# Patient Record
Sex: FEMALE | Race: WHITE | NOT HISPANIC OR LATINO | Employment: UNEMPLOYED | URBAN - METROPOLITAN AREA
[De-identification: names, ages, dates, MRNs, and addresses within clinical notes are randomized per-mention and may not be internally consistent; named-entity substitution may affect disease eponyms.]

---

## 2017-05-22 ENCOUNTER — APPOINTMENT (OUTPATIENT)
Dept: WOMENS IMAGING | Facility: HOSPITAL | Age: 67
End: 2017-05-22

## 2017-05-22 PROCEDURE — 77063 BREAST TOMOSYNTHESIS BI: CPT | Performed by: RADIOLOGY

## 2017-05-22 PROCEDURE — 77067 SCR MAMMO BI INCL CAD: CPT | Performed by: RADIOLOGY

## 2018-03-27 ENCOUNTER — OFFICE VISIT (OUTPATIENT)
Dept: CARDIOLOGY | Facility: CLINIC | Age: 68
End: 2018-03-27

## 2018-03-27 VITALS
SYSTOLIC BLOOD PRESSURE: 154 MMHG | HEART RATE: 88 BPM | HEIGHT: 64 IN | DIASTOLIC BLOOD PRESSURE: 70 MMHG | BODY MASS INDEX: 30.42 KG/M2 | WEIGHT: 178.2 LBS | OXYGEN SATURATION: 95 %

## 2018-03-27 DIAGNOSIS — R09.89 ABSENT PEDAL PULSES: ICD-10-CM

## 2018-03-27 DIAGNOSIS — R09.89 BRUIT OF LEFT CAROTID ARTERY: ICD-10-CM

## 2018-03-27 DIAGNOSIS — R07.2 PRECORDIAL PAIN: Primary | ICD-10-CM

## 2018-03-27 DIAGNOSIS — E78.2 MIXED HYPERLIPIDEMIA: ICD-10-CM

## 2018-03-27 DIAGNOSIS — I73.9 PAD (PERIPHERAL ARTERY DISEASE) (HCC): ICD-10-CM

## 2018-03-27 DIAGNOSIS — R01.1 SYSTOLIC EJECTION MURMUR: ICD-10-CM

## 2018-03-27 DIAGNOSIS — I25.119 CORONARY ARTERY DISEASE INVOLVING NATIVE CORONARY ARTERY OF NATIVE HEART WITH ANGINA PECTORIS (HCC): ICD-10-CM

## 2018-03-27 DIAGNOSIS — I10 ESSENTIAL HYPERTENSION: ICD-10-CM

## 2018-03-27 DIAGNOSIS — R09.89 DECREASED PEDAL PULSES: ICD-10-CM

## 2018-03-27 DIAGNOSIS — M79.10 MYALGIA: ICD-10-CM

## 2018-03-27 DIAGNOSIS — R06.02 SHORTNESS OF BREATH: ICD-10-CM

## 2018-03-27 PROBLEM — E11.9 DIABETES (HCC): Status: ACTIVE | Noted: 2018-03-27

## 2018-03-27 PROCEDURE — 99204 OFFICE O/P NEW MOD 45 MIN: CPT | Performed by: INTERNAL MEDICINE

## 2018-03-27 RX ORDER — CLONIDINE HYDROCHLORIDE 0.1 MG/1
TABLET ORAL 3 TIMES DAILY
COMMUNITY
Start: 2018-03-13 | End: 2018-04-30 | Stop reason: SDUPTHER

## 2018-03-27 RX ORDER — METOPROLOL SUCCINATE 100 MG/1
TABLET, EXTENDED RELEASE ORAL DAILY
COMMUNITY
Start: 2018-02-02 | End: 2019-01-15 | Stop reason: SDUPTHER

## 2018-03-27 RX ORDER — CETIRIZINE HYDROCHLORIDE 10 MG/1
10 TABLET ORAL DAILY
COMMUNITY
End: 2019-08-22 | Stop reason: DRUGHIGH

## 2018-03-27 RX ORDER — LOSARTAN POTASSIUM AND HYDROCHLOROTHIAZIDE 25; 100 MG/1; MG/1
TABLET ORAL DAILY
COMMUNITY
Start: 2018-03-20 | End: 2018-04-30 | Stop reason: SDUPTHER

## 2018-03-27 RX ORDER — NITROGLYCERIN 0.4 MG/1
TABLET SUBLINGUAL AS NEEDED
COMMUNITY
Start: 2018-01-26 | End: 2020-03-11 | Stop reason: SDUPTHER

## 2018-03-27 RX ORDER — CLOPIDOGREL BISULFATE 75 MG/1
75 TABLET ORAL DAILY
COMMUNITY
Start: 2018-03-13

## 2018-03-27 RX ORDER — ATORVASTATIN CALCIUM 40 MG/1
40 TABLET, FILM COATED ORAL DAILY
COMMUNITY
Start: 2018-03-13 | End: 2019-03-06

## 2018-03-27 RX ORDER — PANTOPRAZOLE SODIUM 40 MG/1
40 TABLET, DELAYED RELEASE ORAL DAILY
COMMUNITY
Start: 2018-01-12

## 2018-03-27 RX ORDER — AMLODIPINE BESYLATE 5 MG/1
TABLET ORAL 2 TIMES DAILY
COMMUNITY
Start: 2018-03-20 | End: 2018-05-08 | Stop reason: SDUPTHER

## 2018-03-27 RX ORDER — ALPRAZOLAM 0.5 MG/1
0.5 TABLET ORAL 2 TIMES DAILY PRN
COMMUNITY
Start: 2018-03-23 | End: 2020-07-19

## 2018-03-27 RX ORDER — SENNOSIDES 8.6 MG
TABLET ORAL DAILY PRN
COMMUNITY

## 2018-03-27 NOTE — PATIENT INSTRUCTIONS
Steps to Quit Smoking  Smoking tobacco can be bad for your health. It can also affect almost every organ in your body. Smoking puts you and people around you at risk for many serious long-lasting (chronic) diseases. Quitting smoking is hard, but it is one of the best things that you can do for your health. It is never too late to quit.  What are the benefits of quitting smoking?  When you quit smoking, you lower your risk for getting serious diseases and conditions. They can include:  · Lung cancer or lung disease.  · Heart disease.  · Stroke.  · Heart attack.  · Not being able to have children (infertility).  · Weak bones (osteoporosis) and broken bones (fractures).  If you have coughing, wheezing, and shortness of breath, those symptoms may get better when you quit. You may also get sick less often. If you are pregnant, quitting smoking can help to lower your chances of having a baby of low birth weight.  What can I do to help me quit smoking?  Talk with your doctor about what can help you quit smoking. Some things you can do (strategies) include:  · Quitting smoking totally, instead of slowly cutting back how much you smoke over a period of time.  · Going to in-person counseling. You are more likely to quit if you go to many counseling sessions.  · Using resources and support systems, such as:  ¨ Online chats with a counselor.  ¨ Phone quitlines.  ¨ Printed self-help materials.  ¨ Support groups or group counseling.  ¨ Text messaging programs.  ¨ Mobile phone apps or applications.  · Taking medicines. Some of these medicines may have nicotine in them. If you are pregnant or breastfeeding, do not take any medicines to quit smoking unless your doctor says it is okay. Talk with your doctor about counseling or other things that can help you.  Talk with your doctor about using more than one strategy at the same time, such as taking medicines while you are also going to in-person counseling. This can help make quitting  easier.  What things can I do to make it easier to quit?  Quitting smoking might feel very hard at first, but there is a lot that you can do to make it easier. Take these steps:  · Talk to your family and friends. Ask them to support and encourage you.  · Call phone quitlines, reach out to support groups, or work with a counselor.  · Ask people who smoke to not smoke around you.  · Avoid places that make you want (trigger) to smoke, such as:  ¨ Bars.  ¨ Parties.  ¨ Smoke-break areas at work.  · Spend time with people who do not smoke.  · Lower the stress in your life. Stress can make you want to smoke. Try these things to help your stress:  ¨ Getting regular exercise.  ¨ Deep-breathing exercises.  ¨ Yoga.  ¨ Meditating.  ¨ Doing a body scan. To do this, close your eyes, focus on one area of your body at a time from head to toe, and notice which parts of your body are tense. Try to relax the muscles in those areas.  · Download or buy apps on your mobile phone or tablet that can help you stick to your quit plan. There are many free apps, such as QuitGuide from the CDC (Centers for Disease Control and Prevention). You can find more support from smokefree.gov and other websites.  This information is not intended to replace advice given to you by your health care provider. Make sure you discuss any questions you have with your health care provider.  Document Released: 10/14/2010 Document Revised: 08/15/2017 Document Reviewed: 05/03/2016  [employer] Patient Education © 2017 [employer]  Fat and Cholesterol Restricted Diet  Getting too much fat and cholesterol in your diet may cause health problems. Following this diet helps keep your fat and cholesterol at normal levels. This can keep you from getting sick.  What types of fat should I choose?  · Choose monosaturated and polyunsaturated fats. These are found in foods such as olive oil, canola oil, flaxseeds, walnuts, almonds, and seeds.  · Eat more omega-3  "fats. Good choices include salmon, mackerel, sardines, tuna, flaxseed oil, and ground flaxseeds.  · Limit saturated fats. These are in animal products such as meats, butter, and cream. They can also be in plant products such as palm oil, palm kernel oil, and coconut oil.  · Avoid foods with partially hydrogenated oils in them. These contain trans fats. Examples of foods that have trans fats are stick margarine, some tub margarines, cookies, crackers, and other baked goods.  What general guidelines do I need to follow?  · Check food labels. Look for the words \"trans fat\" and \"saturated fat.\"  · When preparing a meal:  ¨ Fill half of your plate with vegetables and green salads.  ¨ Fill one fourth of your plate with whole grains. Look for the word \"whole\" as the first word in the ingredient list.  ¨ Fill one fourth of your plate with lean protein foods.  · Eat more foods that have fiber, like apples, carrots, beans, peas, and barley.  · Eat more home-cooked foods. Eat less at restaurants and buffets.  · Limit or avoid alcohol.  · Limit foods high in starch and sugar.  · Limit fried foods.  · Cook foods without frying them. Baking, boiling, grilling, and broiling are all great options.  · Lose weight if you are overweight. Losing even a small amount of weight can help your overall health. It can also help prevent diseases such as diabetes and heart disease.  What foods can I eat?  Grains   Whole grains, such as whole wheat or whole grain breads, crackers, cereals, and pasta. Unsweetened oatmeal, bulgur, barley, quinoa, or brown rice. Corn or whole wheat flour tortillas.  Vegetables   Fresh or frozen vegetables (raw, steamed, roasted, or grilled). Green salads.  Fruits   All fresh, canned (in natural juice), or frozen fruits.  Meat and Other Protein Products   Ground beef (85% or leaner), grass-fed beef, or beef trimmed of fat. Skinless chicken or turkey. Ground chicken or turkey. Pork trimmed of fat. All fish and " seafood. Eggs. Dried beans, peas, or lentils. Unsalted nuts or seeds. Unsalted canned or dry beans.  Dairy   Low-fat dairy products, such as skim or 1% milk, 2% or reduced-fat cheeses, low-fat ricotta or cottage cheese, or plain low-fat yogurt.  Fats and Oils   Tub margarines without trans fats. Light or reduced-fat mayonnaise and salad dressings. Avocado. Olive, canola, sesame, or safflower oils. Natural peanut or almond butter (choose ones without added sugar and oil).  The items listed above may not be a complete list of recommended foods or beverages. Contact your dietitian for more options.   What foods are not recommended?  Grains   White bread. White pasta. White rice. Cornbread. Bagels, pastries, and croissants. Crackers that contain trans fat.  Vegetables   White potatoes. Corn. Creamed or fried vegetables. Vegetables in a cheese sauce.  Fruits   Dried fruits. Canned fruit in light or heavy syrup. Fruit juice.  Meat and Other Protein Products   Fatty cuts of meat. Ribs, chicken wings, smith, sausage, bologna, salami, chitterlings, fatback, hot dogs, bratwurst, and packaged luncheon meats. Liver and organ meats.  Dairy   Whole or 2% milk, cream, half-and-half, and cream cheese. Whole milk cheeses. Whole-fat or sweetened yogurt. Full-fat cheeses. Nondairy creamers and whipped toppings. Processed cheese, cheese spreads, or cheese curds.  Sweets and Desserts   Corn syrup, sugars, honey, and molasses. Candy. Jam and jelly. Syrup. Sweetened cereals. Cookies, pies, cakes, donuts, muffins, and ice cream.  Fats and Oils   Butter, stick margarine, lard, shortening, ghee, or smith fat. Coconut, palm kernel, or palm oils.  Beverages   Alcohol. Sweetened drinks (such as sodas, lemonade, and fruit drinks or punches).  The items listed above may not be a complete list of foods and beverages to avoid. Contact your dietitian for more information.   This information is not intended to replace advice given to you by your  health care provider. Make sure you discuss any questions you have with your health care provider.  Document Released: 06/18/2013 Document Revised: 08/24/2017 Document Reviewed: 03/19/2015  [employer] Interactive Patient Education © 2017 [employer]  BMI for Adults  Body mass index (BMI) is a number that is calculated from a person's weight and height. In most adults, the number is used to find how much of an adult's weight is made up of fat. BMI is not as accurate as a direct measure of body fat.  How is BMI calculated?  BMI is calculated by dividing weight in kilograms by height in meters squared. It can also be calculated by dividing weight in pounds by height in inches squared, then multiplying the resulting number by 703. Charts are available to help you find your BMI quickly and easily without doing this calculation.  How is BMI interpreted?  Health care professionals use BMI charts to identify whether an adult is underweight, at a normal weight, or overweight based on the following guidelines:  · Underweight: BMI less than 18.5.  · Normal weight: BMI between 18.5 and 24.9.  · Overweight: BMI between 25 and 29.9.  · Obese: BMI of 30 and above.  BMI is usually interpreted the same for males and females.  Weight includes both fat and muscle, so someone with a muscular build, such as an athlete, may have a BMI that is higher than 24.9. In cases like these, BMI may not accurately depict body fat. To determine if excess body fat is the cause of a BMI of 25 or higher, further assessments may need to be done by a health care provider.  Why is BMI a useful tool?  BMI is used to identify a possible weight problem that may be related to a medical problem or may increase the risk for medical problems. BMI can also be used to promote changes to reach a healthy weight.  This information is not intended to replace advice given to you by your health care provider. Make sure you discuss any questions you have with your health  care provider.  Document Released: 08/29/2005 Document Revised: 04/27/2017 Document Reviewed: 05/15/2015  [employer] Interactive Patient Education © 2017 [employer]

## 2018-03-27 NOTE — PROGRESS NOTES
"Tia Franco is a 67 y.o. female     Chief Complaint   Patient presents with   • Coronary Artery Disease   • Hypertension     has been to [facility] with BP running over 200/110's    • Shortness of Breath       PROBLEM LIST:     1. Coronary artery disease   1.1 stenting   2. Hyperlipidemia   3. Hypertension   4. Triple femoral bypass   5. Syncope  5.1 positive tilt table test, inadequate data   6. Diabetes     Specialty Problems     None            HPI:  Ms. Lyndsay Franco is a 67-year-old white female who presents today for a from Dr. Otoole office to establish care.    The patient has known coronary artery disease and underwent stenting in 2004 and stenting in late 2016 or early 2017 with symptoms of shortness of breath.  A she states she \"had a heart attack\" at one point in time apparently this preceded her first stent.  Ms. Franco also has peripheral arterial disease in his undergone stenting of the left lower extremity.  She presents today because of severe high blood pressure.  Blood pressures have been reasonably well-controlled for prolonged period but, over the last month, markedly increased to as high as 220 mmHg in systole [facility] at [facility].    Ms. Franco has significant exertional dyspnea.  Nate class III levels of activity causing shortness of breath.  She also develops chest discomfort.  She has a pressure-like sensation when she walks.  She also feels this discomfort when her blood pressures high which also causes her to feel \"foggy headed\".  The patient had an episode of syncope in the past and had a positive tilt table test, but has not had significant presyncope or syncope in the recent past.  She doesn't palpitate.  She describes claudication of the left lower extremity.    The patient states that she was started on long-acting metoprolol, losartan hydrochlorothiazide, and amlodipine 3 days ago at her blood pressures, although still high, have improved.  In comparing her " readings with are as her device reads approximate 40 mmHg high.            CURRENT MEDICATION:    Current Outpatient Prescriptions   Medication Sig Dispense Refill   • ALPRAZolam (XANAX) 0.5 MG tablet 2 (Two) Times a Day.     • amLODIPine (NORVASC) 5 MG tablet 2 (Two) Times a Day.     • atorvastatin (LIPITOR) 40 MG tablet Daily.     • cetirizine (zyrTEC) 10 MG tablet Take 10 mg by mouth Daily.     • CloNIDine (CATAPRES) 0.1 MG tablet 3 (Three) Times a Day.     • clopidogrel (PLAVIX) 75 MG tablet Daily.     • losartan-hydrochlorothiazide (HYZAAR) 100-25 MG per tablet Daily.     • metFORMIN (GLUCOPHAGE) 500 MG tablet 2 (Two) Times a Day.     • metoprolol succinate XL (TOPROL-XL) 100 MG 24 hr tablet Daily.     • nitroglycerin (NITROSTAT) 0.4 MG SL tablet As Needed.     • pantoprazole (PROTONIX) 40 MG EC tablet Daily.     • senna (SENOKOT) 8.6 MG tablet tablet Take  by mouth Every Night.       No current facility-administered medications for this visit.        ALLERGIES:    Review of patient's allergies indicates no known allergies.    PAST MEDICAL HISTORY:    Past Medical History:   Diagnosis Date   • Coronary artery disease    • Diabetes mellitus    • Hyperlipidemia    • Hypertension        SURGICAL HISTORY:    Past Surgical History:   Procedure Laterality Date   • BREAST CYST EXCISION     • CARDIAC CATHETERIZATION     • CHOLECYSTECTOMY     • CORONARY STENT PLACEMENT     • FEMORAL FEMORAL BYPASS     • HEMORROIDECTOMY     • HYSTERECTOMY         SOCIAL HISTORY:    Social History     Social History   • Marital status:      Spouse name: N/A   • Number of children: N/A   • Years of education: N/A     Occupational History   • Not on file.     Social History Main Topics   • Smoking status: Current Every Day Smoker   • Smokeless tobacco: Never Used   • Alcohol use No   • Drug use: No   • Sexual activity: Defer     Other Topics Concern   • Not on file     Social History Narrative   • No narrative on file       FAMILY  HISTORY:    History reviewed. No pertinent family history.    Review of Systems   Constitutional: Positive for fatigue. Negative for chills, diaphoresis and fever.   HENT: Positive for congestion, ear pain (fluid in ears currently taking z-mario) and sore throat. Negative for dental problem, drooling, ear discharge, facial swelling, hearing loss, mouth sores, nosebleeds, postnasal drip, rhinorrhea, sinus pain, sinus pressure, sneezing, tinnitus, trouble swallowing and voice change.    Eyes: Negative for visual disturbance.   Respiratory: Positive for shortness of breath. Negative for apnea, cough, choking, chest tightness, wheezing and stridor.    Cardiovascular: Positive for chest pain (pressure like, occurs with activity ), palpitations (occasionally ) and leg swelling (rare, occurs with prolonged sitting or standing ).   Gastrointestinal: Negative for abdominal pain, blood in stool (no melena, no hematuria, no hemtemesis, no hematochezia ), constipation, diarrhea, nausea and vomiting.   Endocrine: Negative for cold intolerance and heat intolerance.   Genitourinary: Negative for difficulty urinating, dysuria, frequency, hematuria and urgency.   Musculoskeletal: Positive for arthralgias (hip), back pain and myalgias (with activity ). Negative for joint swelling, neck pain and neck stiffness.   Skin: Negative for color change, pallor, rash and wound.   Allergic/Immunologic: Negative for environmental allergies, food allergies and immunocompromised state.   Neurological: Positive for light-headedness. Negative for dizziness, tremors, seizures, syncope (1 episode about 1 year ago fell in bathrub due to dizziness, no furher episodes since ), facial asymmetry, speech difficulty, weakness, numbness and headaches.   Hematological: Negative for adenopathy. Does not bruise/bleed easily.   Psychiatric/Behavioral: Positive for sleep disturbance (snoring). The patient is nervous/anxious.        VISIT VITALS:  Vitals:    03/27/18  "1416   BP: 154/70   BP Location: Left arm   Patient Position: Sitting   Pulse: 88   SpO2: 95%   Weight: 80.8 kg (178 lb 3.2 oz)   Height: 162.6 cm (64\")      /70 (BP Location: Left arm, Patient Position: Sitting)   Pulse 88   Ht 162.6 cm (64\")   Wt 80.8 kg (178 lb 3.2 oz)   SpO2 95%   BMI 30.59 kg/m²     RECENT LABS:    Objective       Physical Exam   Constitutional: She is oriented to person, place, and time. She appears well-developed and well-nourished. No distress.   HENT:   Head: Normocephalic and atraumatic.   Eyes: Conjunctivae, EOM and lids are normal. Pupils are equal, round, and reactive to light. Lids are everted and swept, no foreign bodies found.   Negative ptosis  Negative lid lag   Negative arcus senilis      Neck: Normal range of motion. Neck supple. No JVD present. Carotid bruit is present (left carotid bruit). No tracheal deviation present.   Cardiovascular: Normal rate, regular rhythm, S2 normal and intact distal pulses.  Exam reveals gallop and S4 (soft). Exam reveals no S3, no distant heart sounds and no friction rub.    Murmur heard.   Systolic (course JEFFERY RUSB) murmur is present with a grade of 2/6   Pulses:       Carotid pulses are 2+ on the right side, and 2+ on the left side.       Radial pulses are 0 on the right side, and 2+ on the left side.        Dorsalis pedis pulses are 0 on the right side, and 0 on the left side.        Posterior tibial pulses are 1+ on the right side, and 2+ on the left side.   S1 decreased      Pulmonary/Chest: Effort normal and breath sounds normal.   Abdominal: Soft. Bowel sounds are normal. She exhibits no distension, no abdominal bruit and no mass. There is no tenderness. There is no rebound and no guarding.   Negative organomegaly      Musculoskeletal: Normal range of motion. She exhibits no edema, tenderness or deformity.   Lymphadenopathy:     She has no cervical adenopathy.     She has no axillary adenopathy.   Neurological: She is alert and " oriented to person, place, and time.   Skin: Skin is warm and dry. No rash noted. No erythema. No pallor.   Psychiatric: She has a normal mood and affect. Her speech is normal and behavior is normal. Judgment and thought content normal. Cognition and memory are normal.   Nursing note and vitals reviewed.      Procedures      Assessment/Plan    #1.  Coronary artery disease.  I'm concerned about chest pain and progressive exertional dyspnea in this patient with known coronary artery disease.  Therefore, we will perform pharmacologic stress testing as an ischemia assessment for risk prognostication and, potentially, to direct therapy.    #2.  Peripheral arterial disease.  Ms. Franco describes left lower extremity claudication I feel that a near-normal was pedis pulse on that side.  As the patient has had known intervention previously, we'll perform duplex study to ensure no significant residual obstructive disease.  We will also perform carotid duplex study because of the patient's left neck bruit.    #3.  We will use echocardiography to assess LV systolic and diastolic performance, LV filling pressures, and pulmonary pressures to further evaluate the patient's dyspnea.    #4.  Ms. Franco will follow-up with Dr. Otoole per his instructions, and in our office after testing or on a when necessary basis for symptoms as discussed.               Diagnosis Plan   1. Precordial pain     2. Mixed hyperlipidemia     3. Essential hypertension     4. Coronary artery disease involving native coronary artery of native heart with angina pectoris     5. Shortness of breath         No Follow-up on file.         I advised the patient of the risks in continuing to use tobacco, and I provided this patient with smoking cessation educational materials.    During this visit, I spent < 3 minutes counseling the patient regarding smoking cessation.     Discussed the patient's BMI with her. BMI is above normal parameters. Follow-up plan  includes:  educational material.       Edy Winston MA   Scribed for Dr. Quang Suggs by CALVIN Aguilera March 27, 2018 2:57 PM               Electronically signed by:            This note is dictated utilizing voice recognition software.  Although this record has been proof read, transcriptional errors may still be present. If questions occur regarding the content of this record please do not hesitate to call our office.

## 2018-04-23 ENCOUNTER — HOSPITAL ENCOUNTER (OUTPATIENT)
Dept: CARDIOLOGY | Facility: HOSPITAL | Age: 68
Discharge: HOME | End: 2018-04-23
Attending: INTERNAL MEDICINE

## 2018-04-23 ENCOUNTER — OUTSIDE FACILITY SERVICE (OUTPATIENT)
Dept: CARDIOLOGY | Facility: CLINIC | Age: 68
End: 2018-04-23

## 2018-04-23 LAB
MAXIMAL PREDICTED HEART RATE: 155 BPM
MAXIMAL PREDICTED HEART RATE: 155 BPM
STRESS TARGET HR: 132 BPM
STRESS TARGET HR: 132 BPM

## 2018-04-23 PROCEDURE — 78452 HT MUSCLE IMAGE SPECT MULT: CPT

## 2018-04-23 PROCEDURE — 93018 CV STRESS TEST I&R ONLY: CPT | Performed by: INTERNAL MEDICINE

## 2018-04-23 PROCEDURE — 93306 TTE W/DOPPLER COMPLETE: CPT | Performed by: INTERNAL MEDICINE

## 2018-04-23 PROCEDURE — 93306 TTE W/DOPPLER COMPLETE: CPT

## 2018-04-23 PROCEDURE — 93880 EXTRACRANIAL BILAT STUDY: CPT

## 2018-04-23 PROCEDURE — 25010000002 REGADENOSON 0.4 MG/5ML SOLUTION: Performed by: INTERNAL MEDICINE

## 2018-04-23 PROCEDURE — A9500 TC99M SESTAMIBI: HCPCS | Performed by: INTERNAL MEDICINE

## 2018-04-23 PROCEDURE — 0 TECHNETIUM SESTAMIBI: Performed by: INTERNAL MEDICINE

## 2018-04-23 PROCEDURE — 93925 LOWER EXTREMITY STUDY: CPT

## 2018-04-23 PROCEDURE — 93017 CV STRESS TEST TRACING ONLY: CPT

## 2018-04-23 PROCEDURE — 78452 HT MUSCLE IMAGE SPECT MULT: CPT | Performed by: INTERNAL MEDICINE

## 2018-04-23 RX ADMIN — TECHNETIUM TC 99M SESTAMIBI 1 DOSE: 1 INJECTION INTRAVENOUS at 10:30

## 2018-04-23 RX ADMIN — REGADENOSON 0.4 MG: 0.08 INJECTION, SOLUTION INTRAVENOUS at 10:30

## 2018-04-26 ENCOUNTER — TELEPHONE (OUTPATIENT)
Dept: CARDIOLOGY | Facility: CLINIC | Age: 68
End: 2018-04-26

## 2018-04-26 DIAGNOSIS — I73.9 CLAUDICATION (HCC): Primary | ICD-10-CM

## 2018-04-26 DIAGNOSIS — R09.89 DECREASED PEDAL PULSES: ICD-10-CM

## 2018-04-26 DIAGNOSIS — R93.89 ABNORMAL ULTRASOUND: ICD-10-CM

## 2018-04-26 NOTE — TELEPHONE ENCOUNTER
patient aware of stress, echo and lower extremity duplex results .CTA lower extremity BL was recommended with moderate femoral disease noted on lower extremity. patient wants to proceed with CTA and is on metformin. Was informed to hold metformin 24 hrs prior to CTA, CR. Was 0.78 on 03/18/18 she will not need renal prophylaxis and does  Not have contrast dye allergy. Order has been entered into and WADE Valentine will get scheduled.

## 2018-04-30 DIAGNOSIS — I10 ESSENTIAL HYPERTENSION: Primary | ICD-10-CM

## 2018-04-30 RX ORDER — LOSARTAN POTASSIUM AND HYDROCHLOROTHIAZIDE 25; 100 MG/1; MG/1
1 TABLET ORAL DAILY
Qty: 30 TABLET | Refills: 4 | Status: SHIPPED | OUTPATIENT
Start: 2018-04-30 | End: 2019-09-04 | Stop reason: ALTCHOICE

## 2018-04-30 RX ORDER — CLONIDINE HYDROCHLORIDE 0.1 MG/1
0.1 TABLET ORAL 3 TIMES DAILY
Qty: 90 TABLET | Refills: 4 | Status: SHIPPED | OUTPATIENT
Start: 2018-04-30 | End: 2019-08-22 | Stop reason: DRUGHIGH

## 2018-05-04 ENCOUNTER — TELEPHONE (OUTPATIENT)
Dept: CARDIOLOGY | Facility: CLINIC | Age: 68
End: 2018-05-04

## 2018-05-04 DIAGNOSIS — I10 ESSENTIAL HYPERTENSION: Primary | ICD-10-CM

## 2018-05-08 DIAGNOSIS — I10 ESSENTIAL HYPERTENSION: Primary | ICD-10-CM

## 2018-05-08 RX ORDER — AMLODIPINE BESYLATE 5 MG/1
5 TABLET ORAL 2 TIMES DAILY
Qty: 60 TABLET | Refills: 4 | Status: SHIPPED | OUTPATIENT
Start: 2018-05-08 | End: 2019-09-04 | Stop reason: SDUPTHER

## 2018-05-17 ENCOUNTER — TELEPHONE (OUTPATIENT)
Dept: CARDIOLOGY | Facility: CLINIC | Age: 68
End: 2018-05-17

## 2018-05-17 NOTE — TELEPHONE ENCOUNTER
patient made aware of CTA results. Patient to keep f.u apt. On 05/30/18. Patient verbalized understanding. RAVEN/CALVIN     ----- Message from Quang Suggs MD sent at 5/16/2018  4:52 PM EDT -----  Please schedule follow up appointment here (doesnt need to be with Ifeanyi) to determine follow up on severe LE vascular disease   ----- Message -----  From: Gab Mancini  Sent: 5/10/2018   2:27 PM  To: Quang Suggs MD

## 2018-05-30 ENCOUNTER — OFFICE VISIT (OUTPATIENT)
Dept: CARDIOLOGY | Facility: CLINIC | Age: 68
End: 2018-05-30

## 2018-05-30 VITALS
DIASTOLIC BLOOD PRESSURE: 78 MMHG | WEIGHT: 174.6 LBS | BODY MASS INDEX: 29.81 KG/M2 | HEIGHT: 64 IN | SYSTOLIC BLOOD PRESSURE: 142 MMHG | HEART RATE: 83 BPM | OXYGEN SATURATION: 97 %

## 2018-05-30 DIAGNOSIS — I73.9 PAD (PERIPHERAL ARTERY DISEASE) (HCC): ICD-10-CM

## 2018-05-30 DIAGNOSIS — I73.9 CLAUDICATION (HCC): ICD-10-CM

## 2018-05-30 DIAGNOSIS — I70.1 RENAL ARTERY STENOSIS (HCC): Primary | ICD-10-CM

## 2018-05-30 DIAGNOSIS — R06.02 SHORTNESS OF BREATH: ICD-10-CM

## 2018-05-30 DIAGNOSIS — R07.9 CHEST PAIN, UNSPECIFIED TYPE: ICD-10-CM

## 2018-05-30 PROCEDURE — 99214 OFFICE O/P EST MOD 30 MIN: CPT | Performed by: PHYSICIAN ASSISTANT

## 2018-05-30 RX ORDER — HYDROCODONE BITARTRATE AND ACETAMINOPHEN 10; 325 MG/1; MG/1
TABLET ORAL
COMMUNITY
Start: 2018-04-10

## 2018-05-30 NOTE — PROGRESS NOTES
Problem list     Subjective   Lyndsay Franco is a 67 y.o. female     Chief Complaint   Patient presents with   • Follow-up     presents for test follow up   • Chest Pain   • Palpitations   • Shortness of Breath       HPI    Problem list  1.  Coronary artery disease  1.1 history of stenting in 2004 and  2016, inadequate data  1.2 stress test April 2018 demonstrates no evidence of ischemia and preserved LV function  2.  Peripheral vascular disease  2.1 history of iliac stenting bilaterally and slight 2.2 follow-up femorofemoral bypass by Dr. Dupont at [facility] in [place]  2.2 follow-up CT angiogram May 2018 demonstrates occluded iliacs with femorofemoral bypass noted but no discussion on report in regards to patency or disease  2.3 intermittent claudication  2.4 questionable left renal artery stenosis  3.  Labile hypertension  4.  Infrarenal abdominal aortic aneurysm measuring 3 cm by CT scan May 2018  5.  Type 2 diabetes mellitus  6.  Dyslipidemia  7.  Chronic tobacco use    Patient is a 67-year-old female that presents back to follow-up on testing.  Stress test indicated no evidence of ischemia.  Echocardiogram demonstrated low normal systolic function with mild pulmonic insufficiency but otherwise normal.  CT angiogram of the lower extremities and run off demonstrated significant peripheral vascular disease.  There is mention of femorofemoral bypass but no discussion in regards to patency.  CT was ordered because of absent dorsalis pedis pulses in the right lower extremity and patient's complaints of claudication.  Of mention, there is hypoperfusion of the left kidney which is concerning for renal artery stenosis but there is no mention on CT report of significant renal artery stenosis.  CTA report leaves much to be desired.  There is a 3 cm infrarenal aneurysm as described above.    Patient describes having significant lower extremity discomfort.  This occurs at rest with also occasional dysphasias.  Patient  describes having occasional chest pain but nothing as severe as what she felt previously prior to stenting.  She will notice occasional pressure but it resolves spontaneously.  Patient has mild to moderate levels of exertional dyspnea but nothing that has been progressive.  No PND orthopnea.  She doesn't palpitate or have dysrhythmic symptoms.  Otherwise, she complains of poorly controlled blood pressure.  She is on multiple medications.  Otherwise she is doing well      Outpatient Encounter Prescriptions as of 5/30/2018   Medication Sig Dispense Refill   • ALPRAZolam (XANAX) 0.5 MG tablet 2 (Two) Times a Day As Needed.     • amLODIPine (NORVASC) 5 MG tablet Take 1 tablet by mouth 2 (Two) Times a Day. 60 tablet 4   • atorvastatin (LIPITOR) 40 MG tablet Daily.     • cetirizine (zyrTEC) 10 MG tablet Take 10 mg by mouth Daily.     • CloNIDine (CATAPRES) 0.1 MG tablet Take 1 tablet by mouth 3 (Three) Times a Day. 90 tablet 4   • clopidogrel (PLAVIX) 75 MG tablet Daily.     • HYDROcodone-acetaminophen (NORCO)  MG per tablet prn     • losartan-hydrochlorothiazide (HYZAAR) 100-25 MG per tablet Take 1 tablet by mouth Daily. 30 tablet 4   • metFORMIN (GLUCOPHAGE) 500 MG tablet 2 (Two) Times a Day.     • metoprolol succinate XL (TOPROL-XL) 100 MG 24 hr tablet Daily.     • pantoprazole (PROTONIX) 40 MG EC tablet Daily.     • senna (SENOKOT) 8.6 MG tablet tablet Take  by mouth Every Night.     • nitroglycerin (NITROSTAT) 0.4 MG SL tablet As Needed.       No facility-administered encounter medications on file as of 5/30/2018.        Patient has no known allergies.    Past Medical History:   Diagnosis Date   • Coronary artery disease    • Diabetes mellitus    • Hyperlipidemia    • Hypertension    • Myocardial infarction    • Systolic ejection murmur 3/27/2018       Social History     Social History   • Marital status:      Spouse name: N/A   • Number of children: N/A   • Years of education: N/A     Occupational  "History   • Not on file.     Social History Main Topics   • Smoking status: Current Every Day Smoker   • Smokeless tobacco: Never Used   • Alcohol use No   • Drug use: No   • Sexual activity: Defer     Other Topics Concern   • Not on file     Social History Narrative   • No narrative on file       Family History   Problem Relation Age of Onset   • No Known Problems Mother    • No Known Problems Father        Review of Systems   Constitutional: Positive for fatigue.   HENT: Negative.    Eyes: Positive for visual disturbance (reading glasses).   Respiratory: Positive for shortness of breath (occas.).    Cardiovascular: Positive for chest pain (occas.) and palpitations (occas.). Negative for leg swelling.   Gastrointestinal: Negative.    Endocrine: Negative.    Genitourinary: Negative.    Musculoskeletal: Positive for arthralgias and myalgias.   Skin: Negative.    Allergic/Immunologic: Positive for environmental allergies.   Neurological: Positive for light-headedness (occas.).   Hematological: Bruises/bleeds easily (both).   Psychiatric/Behavioral: Negative.    All other systems reviewed and are negative.      Objective   Vitals:    05/30/18 1518   BP: 142/78   BP Location: Left arm   Patient Position: Sitting   Pulse: 83   SpO2: 97%   Weight: 79.2 kg (174 lb 9.6 oz)   Height: 162.6 cm (64\")      /78 (BP Location: Left arm, Patient Position: Sitting)   Pulse 83   Ht 162.6 cm (64\")   Wt 79.2 kg (174 lb 9.6 oz)   SpO2 97%   BMI 29.97 kg/m²     Lab Results (most recent)     None          Physical Exam   Constitutional: She is oriented to person, place, and time. She appears well-developed and well-nourished. No distress.   HENT:   Head: Normocephalic and atraumatic.   Eyes: EOM are normal. Pupils are equal, round, and reactive to light.   Neck: No JVD present.   Cardiovascular: Normal rate, regular rhythm, normal heart sounds and intact distal pulses.  Exam reveals no gallop and no friction rub.    No murmur " heard.  Pulmonary/Chest: Effort normal and breath sounds normal. No respiratory distress. She has no wheezes. She has no rales. She exhibits no tenderness.   Musculoskeletal: Normal range of motion. She exhibits no edema.   Neurological: She is alert and oriented to person, place, and time. No cranial nerve deficit.   Skin: Skin is warm and dry. No rash noted. No erythema. No pallor.   Psychiatric: She has a normal mood and affect. Her behavior is normal.   Nursing note and vitals reviewed.      Procedure   Procedures       Assessment/Plan     Problems Addressed this Visit        Cardiovascular and Mediastinum    PAD (peripheral artery disease)    Relevant Orders    Ambulatory Referral to Cardiothoracic Surgery    Renal artery stenosis - Primary    Relevant Orders    Ambulatory Referral to Cardiothoracic Surgery       Respiratory    Shortness of breath       Nervous and Auditory    Chest pain    Claudication    Relevant Orders    Ambulatory Referral to Cardiothoracic Surgery            Recommendation  1.  Patient has significant claudication symptoms.  CTA of the lower extremities shows disease but no detailed explanation given.  There is mention of fem-fem bypass but no discussion in regards to patency.  Patient has significant symptoms would like reevaluated by a surgeon and had seen Dr. Buck in [facility] in the past.  I will refer to CT surgery to see if they can review images to see if any intervention would be indicated at this time.  Furthermore, there is mention of hypoperfusion of the left kidney which raises concern about possible renal artery stenosis but they do not mention any renal artery stenosis.  Patient has significant labile hypertension although relatively controlled today.  Hopefully they can review this as well.  2.  Otherwise other testing is benign.  We will see her back for follow-up after evaluated by vascular surgeon.  She will follow-up primary as scheduled         I advised the patient of  the risks in continuing to use tobacco, and I provided this patient with smoking cessation educational materials.    During this visit, I spent <3 minutes counseling the patient regarding smoking cessation.     Patient's Body mass index is 29.97 kg/m². BMI is above normal parameters. Recommendations include: educational material.       Electronically signed by:

## 2018-05-30 NOTE — PATIENT INSTRUCTIONS
Obesity, Adult  Obesity is the condition of having too much total body fat. Being overweight or obese means that your weight is greater than what is considered healthy for your body size. Obesity is determined by a measurement called BMI. BMI is an estimate of body fat and is calculated from height and weight. For adults, a BMI of 30 or higher is considered obese.  Obesity can eventually lead to other health concerns and major illnesses, including:  · Stroke.  · Coronary artery disease (CAD).  · Type 2 diabetes.  · Some types of cancer, including cancers of the colon, breast, uterus, and gallbladder.  · Osteoarthritis.  · High blood pressure (hypertension).  · High cholesterol.  · Sleep apnea.  · Gallbladder stones.  · Infertility problems.  What are the causes?  The main cause of obesity is taking in (consuming) more calories than your body uses for energy. Other factors that contribute to this condition may include:  · Being born with genes that make you more likely to become obese.  · Having a medical condition that causes obesity. These conditions include:  ¨ Hypothyroidism.  ¨ Polycystic ovarian syndrome (PCOS).  ¨ Binge-eating disorder.  ¨ Cushing syndrome.  · Taking certain medicines, such as steroids, antidepressants, and seizure medicines.  · Not being physically active (sedentary lifestyle).  · Living where there are limited places to exercise safely or buy healthy foods.  · Not getting enough sleep.  What increases the risk?  The following factors may increase your risk of this condition:  · Having a family history of obesity.  · Being a woman of -American descent.  · Being a man of  descent.  What are the signs or symptoms?  Having excessive body fat is the main symptom of this condition.  How is this diagnosed?  This condition may be diagnosed based on:  · Your symptoms.  · Your medical history.  · A physical exam. Your health care provider may measure:  ¨ Your BMI. If you are an adult  with a BMI between 25 and less than 30, you are considered overweight. If you are an adult with a BMI of 30 or higher, you are considered obese.  ¨ The distances around your hips and your waist (circumferences). These may be compared to each other to help diagnose your condition.  ¨ Your skinfold thickness. Your health care provider may gently pinch a fold of your skin and measure it.  How is this treated?  Treatment for this condition often includes changing your lifestyle. Treatment may include some or all of the following:  · Dietary changes. Work with your health care provider and a dietitian to set a weight-loss goal that is healthy and reasonable for you. Dietary changes may include eating:  ¨ Smaller portions. A portion size is the amount of a particular food that is healthy for you to eat at one time. This varies from person to person.  ¨ Low-calorie or low-fat options.  ¨ More whole grains, fruits, and vegetables.  · Regular physical activity. This may include aerobic activity (cardio) and strength training.  · Medicine to help you lose weight. Your health care provider may prescribe medicine if you are unable to lose 1 pound a week after 6 weeks of eating more healthily and doing more physical activity.  · Surgery. Surgical options may include gastric banding and gastric bypass. Surgery may be done if:  ¨ Other treatments have not helped to improve your condition.  ¨ You have a BMI of 40 or higher.  ¨ You have life-threatening health problems related to obesity.  Follow these instructions at home:     Eating and drinking     · Follow recommendations from your health care provider about what you eat and drink. Your health care provider may advise you to:  ¨ Limit fast foods, sweets, and processed snack foods.  ¨ Choose low-fat options, such as low-fat milk instead of whole milk.  ¨ Eat 5 or more servings of fruits or vegetables every day.  ¨ Eat at home more often. This gives you more control over what you  eat.  ¨ Choose healthy foods when you eat out.  ¨ Learn what a healthy portion size is.  ¨ Keep low-fat snacks on hand.  ¨ Avoid sugary drinks, such as soda, fruit juice, iced tea sweetened with sugar, and flavored milk.  ¨ Eat a healthy breakfast.  · Drink enough water to keep your urine clear or pale yellow.  · Do not go without eating for long periods of time (do not fast) or follow a fad diet. Fasting and fad diets can be unhealthy and even dangerous.  Physical Activity   · Exercise regularly, as told by your health care provider. Ask your health care provider what types of exercise are safe for you and how often you should exercise.  · Warm up and stretch before being active.  · Cool down and stretch after being active.  · Rest between periods of activity.  Lifestyle   · Limit the time that you spend in front of your TV, computer, or video game system.  · Find ways to reward yourself that do not involve food.  · Limit alcohol intake to no more than 1 drink a day for nonpregnant women and 2 drinks a day for men. One drink equals 12 oz of beer, 5 oz of wine, or 1½ oz of hard liquor.  General instructions   · Keep a weight loss journal to keep track of the food you eat and how much you exercise you get.  · Take over-the-counter and prescription medicines only as told by your health care provider.  · Take vitamins and supplements only as told by your health care provider.  · Consider joining a support group. Your health care provider may be able to recommend a support group.  · Keep all follow-up visits as told by your health care provider. This is important.  Contact a health care provider if:  · You are unable to meet your weight loss goal after 6 weeks of dietary and lifestyle changes.  This information is not intended to replace advice given to you by your health care provider. Make sure you discuss any questions you have with your health care provider.  Document Released: 01/25/2006 Document Revised:  05/22/2017 Document Reviewed: 10/05/2016  [employer] Interactive Patient Education © 2017 [employer]  MyPlate from [employer]  The general, healthful diet is based on the 2010 Dietary Guidelines for Americans. The amount of food you need to eat from each food group depends on your age, sex, and level of physical activity and can be individualized by a dietitian. Go to ChooseMyPlate.gov for more information.  What do I need to know about the MyPlate plan?  · Enjoy your food, but eat less.  · Avoid oversized portions.  ¨ ½ of your plate should include fruits and vegetables.  ¨ ¼ of your plate should be grains.  ¨ ¼ of your plate should be protein.  Grains   · Make at least half of your grains whole grains.  · For a 2,000 calorie daily food plan, eat 6 oz every day.  · 1 oz is about 1 slice bread, 1 cup cereal, or ½ cup cooked rice, cereal, or pasta.  Vegetables   · Make half your plate fruits and vegetables.  · For a 2,000 calorie daily food plan, eat 2½ cups every day.  · 1 cup is about 1 cup raw or cooked vegetables or vegetable juice or 2 cups raw leafy greens.  Fruits   · Make half your plate fruits and vegetables.  · For a 2,000 calorie daily food plan, eat 2 cups every day.  · 1 cup is about 1 cup fruit or 100% fruit juice or ½ cup dried fruit.  Protein   · For a 2,000 calorie daily food plan, eat 5½ oz every day.  · 1 oz is about 1 oz meat, poultry, or fish, ¼ cup cooked beans, 1 egg, 1 Tbsp peanut butter, or ½ oz nuts or seeds.  Dairy   · Switch to fat-free or low-fat (1%) milk.  · For a 2,000 calorie daily food plan, eat 3 cups every day.  · 1 cup is about 1 cup milk or yogurt or soy milk (soy beverage), 1½ oz natural cheese, or 2 oz processed cheese.  Fats, Oils, and Empty Calories   · Only small amounts of oils are recommended.  · Empty calories are calories from solid fats or added sugars.  · Compare sodium in foods like soup, bread, and frozen meals. Choose the foods with lower numbers.  · Drink water instead  of sugary drinks.  What foods can I eat?  Grains   Whole grains such as whole wheat, quinoa, millet, and bulgur. Bread, rolls, and pasta made from whole grains. Brown or wild rice. Hot or cold cereals made from whole grains and without added sugar.  Vegetables   All fresh vegetables, especially fresh red, dark green, or orange vegetables. Peas and beans. Low-sodium frozen or canned vegetables prepared without added salt. Low-sodium vegetable juices.  Fruits   All fresh, frozen, and dried fruits. Canned fruit packed in water or fruit juice without added sugar. Fruit juices without added sugar.  Meats and Other Protein Sources   Boiled, baked, or grilled lean meat trimmed of fat. Skinless poultry. Fresh seafood and shellfish. Canned seafood packed in water. Unsalted nuts and unsalted nut butters. Tofu. Dried beans and pea. Eggs.  Dairy   Low-fat or fat-free milk, yogurt, and cheeses.  Sweets and Desserts   Frozen desserts made from low-fat milk.  Fats and Oils   Olive, peanut, and canola oils and margarine. Salad dressing and mayonnaise made from these oils.  Other   Soups and casseroles made from allowed ingredients and without added fat or salt.  The items listed above may not be a complete list of recommended foods or beverages. Contact your dietitian for more options.   What foods are not recommended?  Grains   Sweetened, low-fiber cereals. Packaged baked goods. Snack crackers and chips. Cheese crackers, butter crackers, and biscuits. Frozen waffles, sweet breads, doughnuts, pastries, packaged baking mixes, pancakes, cakes, and cookies.  Vegetables   Regular canned or frozen vegetables or vegetables prepared with salt. Canned tomatoes. Canned tomato sauce. Fried vegetables. Vegetables in cream sauce or cheese sauce.  Fruits   Fruits packed in syrup or made with added sugar.  Meats and Other Protein Sources   Marbled or fatty meats such as ribs. Poultry with skin. Fried meats, poultry, eggs, or fish. Sausages, hot  dogs, and deli meats such as pastrami, bologna, or salami.  Dairy   Whole milk, cream, cheeses made from whole milk, sour cream. Ice cream or yogurt made from whole milk or with added sugar.  Beverages   For adults, no more than one alcoholic drink per day. Regular soft drinks or other sugary beverages. Juice drinks.  Sweets and Desserts   Sugary or fatty desserts, candy, and other sweets.  Fats and Oils   Solid shortening or partially hydrogenated oils. Solid margarine. Margarine that contains trans fats. Butter.  The items listed above may not be a complete list of foods and beverages to avoid. Contact your dietitian for more information.   This information is not intended to replace advice given to you by your health care provider. Make sure you discuss any questions you have with your health care provider.  Document Released: 01/06/2009 Document Revised: 05/25/2017 Document Reviewed: 11/26/2014  [employer] Interactive Patient Education © 2017 [employer]  For more information:    Quit Now [place]  1-800-QUIT-NOW  https://[place].quitlogix.org/en-[place]/  Steps to Quit Smoking  Smoking tobacco can be harmful to your health and can affect almost every organ in your body. Smoking puts you, and those around you, at risk for developing many serious chronic diseases. Quitting smoking is difficult, but it is one of the best things that you can do for your health. It is never too late to quit.  What are the benefits of quitting smoking?  When you quit smoking, you lower your risk of developing serious diseases and conditions, such as:  · Lung cancer or lung disease, such as COPD.  · Heart disease.  · Stroke.  · Heart attack.  · Infertility.  · Osteoporosis and bone fractures.  Additionally, symptoms such as coughing, wheezing, and shortness of breath may get better when you quit. You may also find that you get sick less often because your body is stronger at fighting off colds and infections. If you are pregnant, quitting  smoking can help to reduce your chances of having a baby of low birth weight.  How do I get ready to quit?  When you decide to quit smoking, create a plan to make sure that you are successful. Before you quit:  · Pick a date to quit. Set a date within the next two weeks to give you time to prepare.  · Write down the reasons why you are quitting. Keep this list in places where you will see it often, such as on your bathroom mirror or in your car or wallet.  · Identify the people, places, things, and activities that make you want to smoke (triggers) and avoid them. Make sure to take these actions:  ¨ Throw away all cigarettes at home, at work, and in your car.  ¨ Throw away smoking accessories, such as ashtrays and lighters.  ¨ Clean your car and make sure to empty the ashtray.  ¨ Clean your home, including curtains and carpets.  · Tell your family, friends, and coworkers that you are quitting. Support from your loved ones can make quitting easier.  · Talk with your health care provider about your options for quitting smoking.  · Find out what treatment options are covered by your health insurance.  What strategies can I use to quit smoking?  Talk with your healthcare provider about different strategies to quit smoking. Some strategies include:  · Quitting smoking altogether instead of gradually lessening how much you smoke over a period of time. Research shows that quitting “cold turkey” is more successful than gradually quitting.  · Attending in-person counseling to help you build problem-solving skills. You are more likely to have success in quitting if you attend several counseling sessions. Even short sessions of 10 minutes can be effective.  · Finding resources and support systems that can help you to quit smoking and remain smoke-free after you quit. These resources are most helpful when you use them often. They can include:  ¨ Online chats with a counselor.  ¨ Telephone quitlines.  ¨ Printed self-help  materials.  ¨ Support groups or group counseling.  ¨ Text messaging programs.  ¨ Mobile phone applications.  · Taking medicines to help you quit smoking. (If you are pregnant or breastfeeding, talk with your health care provider first.) Some medicines contain nicotine and some do not. Both types of medicines help with cravings, but the medicines that include nicotine help to relieve withdrawal symptoms. Your health care provider may recommend:  ¨ Nicotine patches, gum, or lozenges.  ¨ Nicotine inhalers or sprays.  ¨ Non-nicotine medicine that is taken by mouth.  Talk with your health care provider about combining strategies, such as taking medicines while you are also receiving in-person counseling. Using these two strategies together makes you more likely to succeed in quitting than if you used either strategy on its own.  If you are pregnant or breastfeeding, talk with your health care provider about finding counseling or other support strategies to quit smoking. Do not take medicine to help you quit smoking unless told to do so by your health care provider.  What things can I do to make it easier to quit?  Quitting smoking might feel overwhelming at first, but there is a lot that you can do to make it easier. Take these important actions:  · Reach out to your family and friends and ask that they support and encourage you during this time. Call telephone quitlines, reach out to support groups, or work with a counselor for support.  · Ask people who smoke to avoid smoking around you.  · Avoid places that trigger you to smoke, such as bars, parties, or smoke-break areas at work.  · Spend time around people who do not smoke.  · Lessen stress in your life, because stress can be a smoking trigger for some people. To lessen stress, try:  ¨ Exercising regularly.  ¨ Deep-breathing exercises.  ¨ Yoga.  ¨ Meditating.  ¨ Performing a body scan. This involves closing your eyes, scanning your body from head to toe, and  noticing which parts of your body are particularly tense. Purposefully relax the muscles in those areas.  · Download or purchase mobile phone or tablet apps (applications) that can help you stick to your quit plan by providing reminders, tips, and encouragement. There are many free apps, such as QuitGuide from the CDC (Centers for Disease Control and Prevention). You can find other support for quitting smoking (smoking cessation) through smokefree.gov and other websites.  How will I feel when I quit smoking?  Within the first 24 hours of quitting smoking, you may start to feel some withdrawal symptoms. These symptoms are usually most noticeable 2-3 days after quitting, but they usually do not last beyond 2-3 weeks. Changes or symptoms that you might experience include:  · Mood swings.  · Restlessness, anxiety, or irritation.  · Difficulty concentrating.  · Dizziness.  · Strong cravings for sugary foods in addition to nicotine.  · Mild weight gain.  · Constipation.  · Nausea.  · Coughing or a sore throat.  · Changes in how your medicines work in your body.  · A depressed mood.  · Difficulty sleeping (insomnia).  After the first 2-3 weeks of quitting, you may start to notice more positive results, such as:  · Improved sense of smell and taste.  · Decreased coughing and sore throat.  · Slower heart rate.  · Lower blood pressure.  · Clearer skin.  · The ability to breathe more easily.  · Fewer sick days.  Quitting smoking is very challenging for most people. Do not get discouraged if you are not successful the first time. Some people need to make many attempts to quit before they achieve long-term success. Do your best to stick to your quit plan, and talk with your health care provider if you have any questions or concerns.  This information is not intended to replace advice given to you by your health care provider. Make sure you discuss any questions you have with your health care provider.  Document Released:  12/12/2002 Document Revised: 08/15/2017 Document Reviewed: 05/03/2016  [employer] Interactive Patient Education © 2017 [employer]

## 2018-06-12 ENCOUNTER — OFFICE VISIT (OUTPATIENT)
Dept: CARDIAC SURGERY | Facility: CLINIC | Age: 68
End: 2018-06-12

## 2018-06-12 VITALS
DIASTOLIC BLOOD PRESSURE: 70 MMHG | BODY MASS INDEX: 30.22 KG/M2 | SYSTOLIC BLOOD PRESSURE: 120 MMHG | HEIGHT: 64 IN | HEART RATE: 77 BPM | TEMPERATURE: 98.3 F | WEIGHT: 177 LBS | OXYGEN SATURATION: 94 %

## 2018-06-12 DIAGNOSIS — I73.9 PAD (PERIPHERAL ARTERY DISEASE) (HCC): Primary | ICD-10-CM

## 2018-06-12 DIAGNOSIS — I70.1 RENAL ARTERY STENOSIS (HCC): ICD-10-CM

## 2018-06-12 DIAGNOSIS — I10 ESSENTIAL HYPERTENSION: ICD-10-CM

## 2018-06-12 PROCEDURE — 99205 OFFICE O/P NEW HI 60 MIN: CPT | Performed by: THORACIC SURGERY (CARDIOTHORACIC VASCULAR SURGERY)

## 2018-06-12 NOTE — PROGRESS NOTES
06/12/2018  Patient Information  Lyndsay Calderon [place] [address]   1950  'PCP/Referring Physician'  Pamela Otoole MD  [phone]  No ref. provider found    Chief Complaint   Patient presents with   • Consult     Old [facility]FE Patient, Ref by Dr. Quang Suggs for PVD-Complains of Bilateral Claudication   • Peripheral Vascular Disease       History of Present Illness:The patient is a 67-year-old female referred by cardiologist, Dr. Suggs of [facility], for evaluation of bilateral lower extremity pain occurring at rest as well as with exertion over the past several months.  The patient has been followed by me in the past having undergone a femoral-femoral [place]-Brad bypass grafting in 2009 for left leg claudication.  She did quite well from that revascularization procedure and was eventually discharged from follow-up.  The patient is also complaining of poorly controlled hypertension which she has had for several years.  Evaluation performed by Dr. Suggs included a CT angiogram of the aorta with runoff done at [facility] on 05/07/2018.  Of significant note is the fact that the patient is still smoking approximately 1 pack of cigarettes per 24 hours      Patient Active Problem List   Diagnosis   • Coronary artery disease involving native coronary artery of native heart with angina pectoris   • Essential hypertension   • Mixed hyperlipidemia   • Diabetes   • Chest pain   • Shortness of breath   • PAD (peripheral artery disease)   • Decreased pedal pulses   • Myalgia   • Bruit of left carotid artery   • Systolic ejection murmur   • Renal artery stenosis   • Claudication     Past Medical History:   Diagnosis Date   • Anxiety    • Arthritis    • Coronary artery disease    • Depression    • Diabetes mellitus    • GERD (gastroesophageal reflux disease)    • Hiatal hernia    •  Hyperlipidemia    • Hypertension    • Myocardial infarction    • Non-functioning kidney     Left    • Peripheral vascular disease    • Renal artery aneurysm    • Systolic ejection murmur 3/27/2018     Past Surgical History:   Procedure Laterality Date   • BREAST CYST EXCISION     • CARDIAC CATHETERIZATION     • CHOLECYSTECTOMY     • CORONARY STENT PLACEMENT     • FEMORAL FEMORAL BYPASS     • HEMORROIDECTOMY     • HYSTERECTOMY         Current Outpatient Prescriptions:   •  ALPRAZolam (XANAX) 0.5 MG tablet, 2 (Two) Times a Day As Needed., Disp: , Rfl:   •  amLODIPine (NORVASC) 5 MG tablet, Take 1 tablet by mouth 2 (Two) Times a Day., Disp: 60 tablet, Rfl: 4  •  atorvastatin (LIPITOR) 40 MG tablet, Daily., Disp: , Rfl:   •  cetirizine (zyrTEC) 10 MG tablet, Take 10 mg by mouth Daily., Disp: , Rfl:   •  CloNIDine (CATAPRES) 0.1 MG tablet, Take 1 tablet by mouth 3 (Three) Times a Day., Disp: 90 tablet, Rfl: 4  •  clopidogrel (PLAVIX) 75 MG tablet, Daily., Disp: , Rfl:   •  HYDROcodone-acetaminophen (NORCO)  MG per tablet, prn, Disp: , Rfl:   •  losartan-hydrochlorothiazide (HYZAAR) 100-25 MG per tablet, Take 1 tablet by mouth Daily., Disp: 30 tablet, Rfl: 4  •  metFORMIN (GLUCOPHAGE) 500 MG tablet, 2 (Two) Times a Day., Disp: , Rfl:   •  metoprolol succinate XL (TOPROL-XL) 100 MG 24 hr tablet, Daily., Disp: , Rfl:   •  nitroglycerin (NITROSTAT) 0.4 MG SL tablet, As Needed., Disp: , Rfl:   •  pantoprazole (PROTONIX) 40 MG EC tablet, Daily., Disp: , Rfl:   •  senna (SENOKOT) 8.6 MG tablet tablet, Take  by mouth Every Night., Disp: , Rfl:   No Known Allergies  Social History     Social History   • Marital status:      Spouse name: N/A   • Number of children: 3   • Years of education: N/A     Occupational History   • [employer]      Disabled-PVD     Social History Main Topics   • Smoking status: Current Every Day Smoker     Packs/day: 1.00     Years: 40.00     Types: Cigarettes   • Smokeless  "tobacco: Never Used   • Alcohol use No   • Drug use: No   • Sexual activity: Defer     Other Topics Concern   • Not on file     Social History Narrative    Lives in [place] alone.      Family History   Problem Relation Age of Onset   • Adopted: Yes   • No Known Problems Mother    • No Known Problems Father      Review of Systems   Constitution: Negative for chills, fever, malaise/fatigue, night sweats and weight loss.   HENT: Negative for hearing loss, odynophagia and sore throat.    Cardiovascular: Positive for claudication (bilateral) and dyspnea on exertion. Negative for chest pain, leg swelling, orthopnea and palpitations.   Respiratory: Positive for cough and wheezing. Negative for hemoptysis.    Endocrine: Negative for cold intolerance, heat intolerance, polydipsia, polyphagia and polyuria.   Hematologic/Lymphatic: Bruises/bleeds easily.   Skin: Negative for itching and rash.   Musculoskeletal: Positive for arthritis, back pain and joint pain. Negative for joint swelling and myalgias.   Gastrointestinal: Positive for bloating, abdominal pain, constipation and diarrhea. Negative for hematemesis, hematochezia, melena, nausea and vomiting.   Genitourinary: Negative for dysuria, frequency and hematuria.   Neurological: Negative for focal weakness, headaches, numbness and seizures.   Psychiatric/Behavioral: Positive for depression. Negative for suicidal ideas. The patient is nervous/anxious.    All other systems reviewed and are negative.    Vitals:    06/12/18 0958   BP: 120/70   BP Location: Left arm   Patient Position: Sitting   Pulse: 77   Temp: 98.3 °F (36.8 °C)   SpO2: 94%   Weight: 80.3 kg (177 lb)   Height: 162.6 cm (64\")      Physical Exam   Constitutional: She is oriented to person, place, and time. She appears well-developed and well-nourished.   HENT:   Head: Normocephalic.   Eyes: Conjunctivae and EOM are normal. Pupils are equal, round, and reactive to light. No scleral icterus.   Neck: No tracheal " deviation present. No thyromegaly present.   Cardiovascular: Normal rate, regular rhythm, normal heart sounds and intact distal pulses.    Specifically of note of the pulses in the legs:  Right leg had palpable femoral and strong pedal pulses palpable and also strong dopplerable pedal pulses  Left leg had palpable femoral pulses and strong palpable pedal pulses as well as strong dopplerable pedal pulses   Pulmonary/Chest: Effort normal and breath sounds normal. No stridor. No respiratory distress. She has no wheezes.   Abdominal: Soft. Bowel sounds are normal. She exhibits no distension. There is no tenderness. There is no rebound.   Musculoskeletal: Normal range of motion. She exhibits no edema.   Lymphadenopathy:     She has no cervical adenopathy.   Neurological: She is alert and oriented to person, place, and time. No cranial nerve deficit.   Skin: Skin is warm and dry. No erythema.   Psychiatric: She has a normal mood and affect. Her behavior is normal. Judgment and thought content normal.       Labs/Imaging: Reviewed the CT angiogram done at [facility] on 05/07/2018-the femoral-femoral bypass graft is patent.  It appears that the left kidney is nonperfused and very atrophic.  Mentioned in the report of 3 cm infrarenal abdominal aneurysm.    Assessment/Plan: #1.  Peripheral vascular disease status post femoral-femoral bypass for left common iliac occlusion with claudication of the left leg with excellent results.  Procedure was performed in 2009 and still patient has palpable pedal pulses in both legs  #2.  Hypertension - and currently on 4 antihypertensive medications.  Possible renovascular hypertension with left atrophic kidney  3.  Lower extremity pain of unclear etiology-possible spinal stenosis    #4.  3 cm infrarenal abdominal aneurysm .  #5.  Ongoing tobacco abuse of a pack of cigarettes per day  #6.  History of coronary artery disease with coronary artery stenting    Plan: I am going to discuss  this patient's overall situation with her lower leg pain as well as her hypertension with what appears to be an occluded left renal artery with very atrophic left kidney and possible renovascular hypertension with Dr. Suggs of cardiology at [facility].  I think she needs a renal medicine consultation and evaluation for possible renovascular hypertension.  She may also need a neurosurgical consultation for possible spinal stenosis as the etiology for her lower extremity pain.  She also needs follow-up for this infrarenal abdominal aortic aneurysm with yearly CT scans or ultrasound of the infrarenal aorta.  After I discussed this overall situation with Dr. Suggs we will see whether he feels that she needs a renal vascular hypertension evaluation [place] [facility] [place] as well as possible evaluation for spinal stenosis by neurosurgery also in [place].     Patient Active Problem List   Diagnosis   • Coronary artery disease involving native coronary artery of native heart with angina pectoris   • Essential hypertension   • Mixed hyperlipidemia   • Diabetes   • Chest pain   • Shortness of breath   • PAD (peripheral artery disease)   • Decreased pedal pulses   • Myalgia   • Bruit of left carotid artery   • Systolic ejection murmur   • Renal artery stenosis   • Claudication     Signed by:Jose Dupont M.D.    Addendum: 06/18/2018  I reviewed the CT angiogram with Dr. Tavia Lewis of radiology and she was able to view more images of the CTA than I was able to do in the office.  She thinks there may be a tight stenosis at the takeoff of the right renal artery and she thinks there is some faint imaging of the left renal artery.  I have reviewed the CT angiogram with my partner Dr. Mcallister and he feels as though a angiogram is indicated of the aorta to include the renal arteries.  He he feels as though we may need to do a right renal artery stent.  We are going to contact Ambrosio Salvador and arrange  for this angiogram of the renal arteries with aorta and runoff in the very near future at [facility].  I have also discussed this with Dr. Suggs and he understands and agrees that renal angiogram is indicated.  In order to avoid possible percutaneous access injury to the femoral-femoral bypass graft Dr. Mcallister plans to do a cutdown below the femoral-femoral bypass on the right leg to do the angiogram.     Dr. Jose Dupont M.D.

## 2018-06-13 ENCOUNTER — TRANSCRIBE ORDERS (OUTPATIENT)
Dept: CARDIAC SURGERY | Facility: CLINIC | Age: 68
End: 2018-06-13

## 2018-06-13 DIAGNOSIS — I73.9 PAD (PERIPHERAL ARTERY DISEASE) (HCC): Primary | ICD-10-CM

## 2018-06-15 ENCOUNTER — PREP FOR SURGERY (OUTPATIENT)
Dept: OTHER | Facility: HOSPITAL | Age: 68
End: 2018-06-15

## 2018-06-15 DIAGNOSIS — I73.9 PAD (PERIPHERAL ARTERY DISEASE) (HCC): Primary | ICD-10-CM

## 2018-06-15 DIAGNOSIS — I73.9 PVD (PERIPHERAL VASCULAR DISEASE) (HCC): Primary | ICD-10-CM

## 2018-06-18 ENCOUNTER — APPOINTMENT (OUTPATIENT)
Dept: PREADMISSION TESTING | Facility: HOSPITAL | Age: 68
End: 2018-06-18

## 2018-06-18 ENCOUNTER — ANESTHESIA EVENT (OUTPATIENT)
Dept: PERIOP | Facility: HOSPITAL | Age: 68
End: 2018-06-18

## 2018-06-19 ENCOUNTER — HOSPITAL ENCOUNTER (OUTPATIENT)
Facility: HOSPITAL | Age: 68
Discharge: HOME | End: 2018-06-20
Attending: THORACIC SURGERY (CARDIOTHORACIC VASCULAR SURGERY) | Admitting: THORACIC SURGERY (CARDIOTHORACIC VASCULAR SURGERY)

## 2018-06-19 ENCOUNTER — ANESTHESIA (OUTPATIENT)
Dept: PERIOP | Facility: HOSPITAL | Age: 68
End: 2018-06-19

## 2018-06-19 ENCOUNTER — APPOINTMENT (OUTPATIENT)
Dept: GENERAL RADIOLOGY | Facility: HOSPITAL | Age: 68
End: 2018-06-19

## 2018-06-19 DIAGNOSIS — I73.9 PVD (PERIPHERAL VASCULAR DISEASE) (HCC): ICD-10-CM

## 2018-06-19 LAB
ANION GAP SERPL CALCULATED.3IONS-SCNC: 7 MMOL/L (ref 3–11)
APTT PPP: 26.5 SECONDS (ref 24–31)
BASOPHILS # BLD AUTO: 0.02 10*3/MM3 (ref 0–0.2)
BASOPHILS NFR BLD AUTO: 0.2 % (ref 0–1)
BUN BLD-MCNC: 19 MG/DL (ref 9–23)
BUN/CREAT SERPL: 20.4 (ref 7–25)
CALCIUM SPEC-SCNC: 9.7 MG/DL (ref 8.7–10.4)
CHLORIDE SERPL-SCNC: 105 MMOL/L (ref 99–109)
CO2 SERPL-SCNC: 26 MMOL/L (ref 20–31)
CREAT BLD-MCNC: 0.93 MG/DL (ref 0.6–1.3)
DEPRECATED RDW RBC AUTO: 45.8 FL (ref 37–54)
EOSINOPHIL # BLD AUTO: 0.08 10*3/MM3 (ref 0–0.3)
EOSINOPHIL NFR BLD AUTO: 0.8 % (ref 0–3)
ERYTHROCYTE [DISTWIDTH] IN BLOOD BY AUTOMATED COUNT: 14 % (ref 11.3–14.5)
GFR SERPL CREATININE-BSD FRML MDRD: 60 ML/MIN/1.73
GLUCOSE BLD-MCNC: 131 MG/DL (ref 70–100)
GLUCOSE BLDC GLUCOMTR-MCNC: 131 MG/DL (ref 70–130)
GLUCOSE BLDC GLUCOMTR-MCNC: 194 MG/DL (ref 70–130)
HBA1C MFR BLD: 7.1 % (ref 4.8–5.6)
HCT VFR BLD AUTO: 36.4 % (ref 34.5–44)
HGB BLD-MCNC: 11.5 G/DL (ref 11.5–15.5)
IMM GRANULOCYTES # BLD: 0.03 10*3/MM3 (ref 0–0.03)
IMM GRANULOCYTES NFR BLD: 0.3 % (ref 0–0.6)
INR PPP: 0.98 (ref 0.91–1.09)
LYMPHOCYTES # BLD AUTO: 2.76 10*3/MM3 (ref 0.6–4.8)
LYMPHOCYTES NFR BLD AUTO: 28 % (ref 24–44)
MCH RBC QN AUTO: 28.3 PG (ref 27–31)
MCHC RBC AUTO-ENTMCNC: 31.6 G/DL (ref 32–36)
MCV RBC AUTO: 89.4 FL (ref 80–99)
MONOCYTES # BLD AUTO: 0.67 10*3/MM3 (ref 0–1)
MONOCYTES NFR BLD AUTO: 6.8 % (ref 0–12)
NEUTROPHILS # BLD AUTO: 6.34 10*3/MM3 (ref 1.5–8.3)
NEUTROPHILS NFR BLD AUTO: 64.2 % (ref 41–71)
PLATELET # BLD AUTO: 352 10*3/MM3 (ref 150–450)
PMV BLD AUTO: 10.5 FL (ref 6–12)
POTASSIUM BLD-SCNC: 4.1 MMOL/L (ref 3.5–5.5)
PROTHROMBIN TIME: 10.3 SECONDS (ref 9.6–11.5)
RBC # BLD AUTO: 4.07 10*6/MM3 (ref 3.89–5.14)
SODIUM BLD-SCNC: 138 MMOL/L (ref 132–146)
WBC NRBC COR # BLD: 9.87 10*3/MM3 (ref 3.5–10.8)

## 2018-06-19 PROCEDURE — 63710000001 HYDROCODONE-ACETAMINOPHEN 7.5-325 MG TABLET: Performed by: THORACIC SURGERY (CARDIOTHORACIC VASCULAR SURGERY)

## 2018-06-19 PROCEDURE — 75625 CONTRAST EXAM ABDOMINL AORTA: CPT | Performed by: THORACIC SURGERY (CARDIOTHORACIC VASCULAR SURGERY)

## 2018-06-19 PROCEDURE — 25010000002 FENTANYL CITRATE (PF) 100 MCG/2ML SOLUTION: Performed by: NURSE ANESTHETIST, CERTIFIED REGISTERED

## 2018-06-19 PROCEDURE — A9270 NON-COVERED ITEM OR SERVICE: HCPCS | Performed by: THORACIC SURGERY (CARDIOTHORACIC VASCULAR SURGERY)

## 2018-06-19 PROCEDURE — C1887 CATHETER, GUIDING: HCPCS | Performed by: THORACIC SURGERY (CARDIOTHORACIC VASCULAR SURGERY)

## 2018-06-19 PROCEDURE — 83036 HEMOGLOBIN GLYCOSYLATED A1C: CPT | Performed by: PHYSICIAN ASSISTANT

## 2018-06-19 PROCEDURE — C1769 GUIDE WIRE: HCPCS | Performed by: THORACIC SURGERY (CARDIOTHORACIC VASCULAR SURGERY)

## 2018-06-19 PROCEDURE — 82962 GLUCOSE BLOOD TEST: CPT

## 2018-06-19 PROCEDURE — 25010000002 PHENYLEPHRINE PER 1 ML: Performed by: NURSE ANESTHETIST, CERTIFIED REGISTERED

## 2018-06-19 PROCEDURE — 25010000002 CEFUROXIME: Performed by: THORACIC SURGERY (CARDIOTHORACIC VASCULAR SURGERY)

## 2018-06-19 PROCEDURE — 85610 PROTHROMBIN TIME: CPT | Performed by: PHYSICIAN ASSISTANT

## 2018-06-19 PROCEDURE — 25010000002 PROPOFOL 10 MG/ML EMULSION: Performed by: NURSE ANESTHETIST, CERTIFIED REGISTERED

## 2018-06-19 PROCEDURE — 25010000003 CEFAZOLIN IN DEXTROSE 2-4 GM/100ML-% SOLUTION: Performed by: NURSE ANESTHETIST, CERTIFIED REGISTERED

## 2018-06-19 PROCEDURE — 25010000002 DEXAMETHASONE PER 1 MG: Performed by: NURSE ANESTHETIST, CERTIFIED REGISTERED

## 2018-06-19 PROCEDURE — 80048 BASIC METABOLIC PNL TOTAL CA: CPT | Performed by: PHYSICIAN ASSISTANT

## 2018-06-19 PROCEDURE — 63710000001 CLONIDINE 0.1 MG TABLET: Performed by: THORACIC SURGERY (CARDIOTHORACIC VASCULAR SURGERY)

## 2018-06-19 PROCEDURE — G0378 HOSPITAL OBSERVATION PER HR: HCPCS

## 2018-06-19 PROCEDURE — C1894 INTRO/SHEATH, NON-LASER: HCPCS | Performed by: THORACIC SURGERY (CARDIOTHORACIC VASCULAR SURGERY)

## 2018-06-19 PROCEDURE — 85730 THROMBOPLASTIN TIME PARTIAL: CPT | Performed by: PHYSICIAN ASSISTANT

## 2018-06-19 PROCEDURE — 37236 OPEN/PERQ PLACE STENT 1ST: CPT | Performed by: THORACIC SURGERY (CARDIOTHORACIC VASCULAR SURGERY)

## 2018-06-19 PROCEDURE — 25010000002 HEPARIN (PORCINE) PER 1000 UNITS: Performed by: THORACIC SURGERY (CARDIOTHORACIC VASCULAR SURGERY)

## 2018-06-19 PROCEDURE — 75625 CONTRAST EXAM ABDOMINL AORTA: CPT

## 2018-06-19 PROCEDURE — 25010000002 HEPARIN (PORCINE) PER 1000 UNITS: Performed by: NURSE ANESTHETIST, CERTIFIED REGISTERED

## 2018-06-19 PROCEDURE — 36200 PLACE CATHETER IN AORTA: CPT | Performed by: THORACIC SURGERY (CARDIOTHORACIC VASCULAR SURGERY)

## 2018-06-19 PROCEDURE — C1876 STENT, NON-COA/NON-COV W/DEL: HCPCS | Performed by: THORACIC SURGERY (CARDIOTHORACIC VASCULAR SURGERY)

## 2018-06-19 PROCEDURE — 85025 COMPLETE CBC W/AUTO DIFF WBC: CPT | Performed by: PHYSICIAN ASSISTANT

## 2018-06-19 PROCEDURE — 63710000001 SENNA 8.6 MG TABLET: Performed by: THORACIC SURGERY (CARDIOTHORACIC VASCULAR SURGERY)

## 2018-06-19 PROCEDURE — 63710000001 AMLODIPINE 5 MG TABLET: Performed by: THORACIC SURGERY (CARDIOTHORACIC VASCULAR SURGERY)

## 2018-06-19 PROCEDURE — 25010000002 NEOSTIGMINE PER 0.5 MG: Performed by: NURSE ANESTHETIST, CERTIFIED REGISTERED

## 2018-06-19 PROCEDURE — 25010000002 ONDANSETRON PER 1 MG: Performed by: NURSE ANESTHETIST, CERTIFIED REGISTERED

## 2018-06-19 PROCEDURE — 25010000002 IOPAMIDOL 61 % SOLUTION: Performed by: THORACIC SURGERY (CARDIOTHORACIC VASCULAR SURGERY)

## 2018-06-19 DEVICE — IMPLANTABLE DEVICE: Type: IMPLANTABLE DEVICE | Status: FUNCTIONAL

## 2018-06-19 RX ORDER — CETIRIZINE HYDROCHLORIDE 10 MG/1
10 TABLET ORAL DAILY
Status: DISCONTINUED | OUTPATIENT
Start: 2018-06-20 | End: 2018-06-20 | Stop reason: HOSPADM

## 2018-06-19 RX ORDER — NALOXONE HCL 0.4 MG/ML
0.4 VIAL (ML) INJECTION
Status: DISCONTINUED | OUTPATIENT
Start: 2018-06-19 | End: 2018-06-20 | Stop reason: HOSPADM

## 2018-06-19 RX ORDER — SODIUM CHLORIDE 9 MG/ML
INJECTION, SOLUTION INTRAVENOUS CONTINUOUS PRN
Status: DISCONTINUED | OUTPATIENT
Start: 2018-06-19 | End: 2018-06-19 | Stop reason: SURG

## 2018-06-19 RX ORDER — PROPOFOL 10 MG/ML
VIAL (ML) INTRAVENOUS AS NEEDED
Status: DISCONTINUED | OUTPATIENT
Start: 2018-06-19 | End: 2018-06-19 | Stop reason: SURG

## 2018-06-19 RX ORDER — SODIUM CHLORIDE 0.9 % (FLUSH) 0.9 %
1-10 SYRINGE (ML) INJECTION AS NEEDED
Status: DISCONTINUED | OUTPATIENT
Start: 2018-06-19 | End: 2018-06-19 | Stop reason: HOSPADM

## 2018-06-19 RX ORDER — LIDOCAINE HYDROCHLORIDE 10 MG/ML
INJECTION, SOLUTION INFILTRATION; PERINEURAL AS NEEDED
Status: DISCONTINUED | OUTPATIENT
Start: 2018-06-19 | End: 2018-06-19 | Stop reason: SURG

## 2018-06-19 RX ORDER — HYDROMORPHONE HYDROCHLORIDE 1 MG/ML
0.5 INJECTION, SOLUTION INTRAMUSCULAR; INTRAVENOUS; SUBCUTANEOUS
Status: DISCONTINUED | OUTPATIENT
Start: 2018-06-19 | End: 2018-06-19 | Stop reason: HOSPADM

## 2018-06-19 RX ORDER — METOPROLOL SUCCINATE 100 MG/1
100 TABLET, EXTENDED RELEASE ORAL DAILY
Status: DISCONTINUED | OUTPATIENT
Start: 2018-06-20 | End: 2018-06-20 | Stop reason: HOSPADM

## 2018-06-19 RX ORDER — PANTOPRAZOLE SODIUM 40 MG/1
40 TABLET, DELAYED RELEASE ORAL
Status: DISCONTINUED | OUTPATIENT
Start: 2018-06-20 | End: 2018-06-20 | Stop reason: HOSPADM

## 2018-06-19 RX ORDER — FAMOTIDINE 20 MG/1
20 TABLET, FILM COATED ORAL ONCE
Status: COMPLETED | OUTPATIENT
Start: 2018-06-19 | End: 2018-06-19

## 2018-06-19 RX ORDER — DEXAMETHASONE SODIUM PHOSPHATE 4 MG/ML
INJECTION, SOLUTION INTRA-ARTICULAR; INTRALESIONAL; INTRAMUSCULAR; INTRAVENOUS; SOFT TISSUE AS NEEDED
Status: DISCONTINUED | OUTPATIENT
Start: 2018-06-19 | End: 2018-06-19 | Stop reason: SURG

## 2018-06-19 RX ORDER — CLONIDINE HYDROCHLORIDE 0.1 MG/1
0.1 TABLET ORAL EVERY 8 HOURS SCHEDULED
Status: DISCONTINUED | OUTPATIENT
Start: 2018-06-19 | End: 2018-06-20 | Stop reason: HOSPADM

## 2018-06-19 RX ORDER — BUPIVACAINE HYDROCHLORIDE AND EPINEPHRINE 5; 5 MG/ML; UG/ML
INJECTION, SOLUTION EPIDURAL; INTRACAUDAL; PERINEURAL AS NEEDED
Status: DISCONTINUED | OUTPATIENT
Start: 2018-06-19 | End: 2018-06-19 | Stop reason: HOSPADM

## 2018-06-19 RX ORDER — CLOPIDOGREL BISULFATE 75 MG/1
75 TABLET ORAL DAILY
Status: DISCONTINUED | OUTPATIENT
Start: 2018-06-20 | End: 2018-06-20 | Stop reason: HOSPADM

## 2018-06-19 RX ORDER — FENTANYL CITRATE 50 UG/ML
50 INJECTION, SOLUTION INTRAMUSCULAR; INTRAVENOUS
Status: DISCONTINUED | OUTPATIENT
Start: 2018-06-19 | End: 2018-06-19 | Stop reason: HOSPADM

## 2018-06-19 RX ORDER — CEFAZOLIN SODIUM 2 G/100ML
INJECTION, SOLUTION INTRAVENOUS AS NEEDED
Status: DISCONTINUED | OUTPATIENT
Start: 2018-06-19 | End: 2018-06-19 | Stop reason: SURG

## 2018-06-19 RX ORDER — ROCURONIUM BROMIDE 10 MG/ML
INJECTION, SOLUTION INTRAVENOUS AS NEEDED
Status: DISCONTINUED | OUTPATIENT
Start: 2018-06-19 | End: 2018-06-19 | Stop reason: SURG

## 2018-06-19 RX ORDER — GLYCOPYRROLATE 0.2 MG/ML
INJECTION INTRAMUSCULAR; INTRAVENOUS AS NEEDED
Status: DISCONTINUED | OUTPATIENT
Start: 2018-06-19 | End: 2018-06-19 | Stop reason: SURG

## 2018-06-19 RX ORDER — ONDANSETRON 2 MG/ML
INJECTION INTRAMUSCULAR; INTRAVENOUS AS NEEDED
Status: DISCONTINUED | OUTPATIENT
Start: 2018-06-19 | End: 2018-06-19 | Stop reason: SURG

## 2018-06-19 RX ORDER — ALPRAZOLAM 0.5 MG/1
0.5 TABLET ORAL 2 TIMES DAILY PRN
Status: DISCONTINUED | OUTPATIENT
Start: 2018-06-19 | End: 2018-06-20 | Stop reason: HOSPADM

## 2018-06-19 RX ORDER — HEPARIN SODIUM 1000 [USP'U]/ML
INJECTION, SOLUTION INTRAVENOUS; SUBCUTANEOUS AS NEEDED
Status: DISCONTINUED | OUTPATIENT
Start: 2018-06-19 | End: 2018-06-19 | Stop reason: SURG

## 2018-06-19 RX ORDER — ONDANSETRON 2 MG/ML
4 INJECTION INTRAMUSCULAR; INTRAVENOUS ONCE AS NEEDED
Status: DISCONTINUED | OUTPATIENT
Start: 2018-06-19 | End: 2018-06-19 | Stop reason: HOSPADM

## 2018-06-19 RX ORDER — ATORVASTATIN CALCIUM 40 MG/1
40 TABLET, FILM COATED ORAL DAILY
Status: DISCONTINUED | OUTPATIENT
Start: 2018-06-20 | End: 2018-06-20 | Stop reason: HOSPADM

## 2018-06-19 RX ORDER — LIDOCAINE HYDROCHLORIDE 10 MG/ML
0.5 INJECTION, SOLUTION EPIDURAL; INFILTRATION; INTRACAUDAL; PERINEURAL ONCE AS NEEDED
Status: COMPLETED | OUTPATIENT
Start: 2018-06-19 | End: 2018-06-19

## 2018-06-19 RX ORDER — FENTANYL CITRATE 50 UG/ML
INJECTION, SOLUTION INTRAMUSCULAR; INTRAVENOUS AS NEEDED
Status: DISCONTINUED | OUTPATIENT
Start: 2018-06-19 | End: 2018-06-19 | Stop reason: SURG

## 2018-06-19 RX ORDER — ESMOLOL HYDROCHLORIDE 10 MG/ML
INJECTION INTRAVENOUS AS NEEDED
Status: DISCONTINUED | OUTPATIENT
Start: 2018-06-19 | End: 2018-06-19 | Stop reason: SURG

## 2018-06-19 RX ORDER — SODIUM CHLORIDE 450 MG/100ML
75 INJECTION, SOLUTION INTRAVENOUS CONTINUOUS
Status: DISCONTINUED | OUTPATIENT
Start: 2018-06-19 | End: 2018-06-20 | Stop reason: HOSPADM

## 2018-06-19 RX ORDER — AMLODIPINE BESYLATE 5 MG/1
5 TABLET ORAL 2 TIMES DAILY
Status: DISCONTINUED | OUTPATIENT
Start: 2018-06-19 | End: 2018-06-20 | Stop reason: HOSPADM

## 2018-06-19 RX ORDER — HYDROCODONE BITARTRATE AND ACETAMINOPHEN 7.5; 325 MG/1; MG/1
1 TABLET ORAL EVERY 4 HOURS PRN
Status: DISCONTINUED | OUTPATIENT
Start: 2018-06-19 | End: 2018-06-20 | Stop reason: HOSPADM

## 2018-06-19 RX ORDER — SENNOSIDES 8.6 MG
1 TABLET ORAL NIGHTLY
Status: DISCONTINUED | OUTPATIENT
Start: 2018-06-19 | End: 2018-06-20 | Stop reason: HOSPADM

## 2018-06-19 RX ORDER — NICOTINE 21 MG/24HR
1 PATCH, TRANSDERMAL 24 HOURS TRANSDERMAL EVERY 24 HOURS
Status: DISCONTINUED | OUTPATIENT
Start: 2018-06-19 | End: 2018-06-19 | Stop reason: HOSPADM

## 2018-06-19 RX ORDER — SODIUM CHLORIDE, SODIUM LACTATE, POTASSIUM CHLORIDE, CALCIUM CHLORIDE 600; 310; 30; 20 MG/100ML; MG/100ML; MG/100ML; MG/100ML
9 INJECTION, SOLUTION INTRAVENOUS CONTINUOUS
Status: DISCONTINUED | OUTPATIENT
Start: 2018-06-19 | End: 2018-06-19 | Stop reason: HOSPADM

## 2018-06-19 RX ORDER — MORPHINE SULFATE 2 MG/ML
4 INJECTION, SOLUTION INTRAMUSCULAR; INTRAVENOUS EVERY 4 HOURS PRN
Status: DISCONTINUED | OUTPATIENT
Start: 2018-06-19 | End: 2018-06-20 | Stop reason: HOSPADM

## 2018-06-19 RX ORDER — LABETALOL HYDROCHLORIDE 5 MG/ML
5 INJECTION, SOLUTION INTRAVENOUS
Status: DISCONTINUED | OUTPATIENT
Start: 2018-06-19 | End: 2018-06-19 | Stop reason: HOSPADM

## 2018-06-19 RX ADMIN — HYDROCODONE BITARTRATE AND ACETAMINOPHEN 1 TABLET: 7.5; 325 TABLET ORAL at 21:48

## 2018-06-19 RX ADMIN — PHENYLEPHRINE HYDROCHLORIDE 80 MCG: 10 INJECTION INTRAVENOUS at 13:58

## 2018-06-19 RX ADMIN — PHENYLEPHRINE HYDROCHLORIDE 80 MCG: 10 INJECTION INTRAVENOUS at 14:16

## 2018-06-19 RX ADMIN — GLYCOPYRROLATE 0.2 MG: 0.2 INJECTION, SOLUTION INTRAMUSCULAR; INTRAVENOUS at 14:16

## 2018-06-19 RX ADMIN — EPHEDRINE SULFATE 10 MG: 50 INJECTION INTRAMUSCULAR; INTRAVENOUS; SUBCUTANEOUS at 14:16

## 2018-06-19 RX ADMIN — CLONIDINE HYDROCHLORIDE 0.1 MG: 0.1 TABLET ORAL at 21:48

## 2018-06-19 RX ADMIN — AMLODIPINE BESYLATE 5 MG: 5 TABLET ORAL at 21:48

## 2018-06-19 RX ADMIN — FENTANYL CITRATE 100 MCG: 50 INJECTION, SOLUTION INTRAMUSCULAR; INTRAVENOUS at 13:53

## 2018-06-19 RX ADMIN — LIDOCAINE HYDROCHLORIDE 70 MG: 10 INJECTION, SOLUTION INFILTRATION; PERINEURAL at 13:54

## 2018-06-19 RX ADMIN — SODIUM CHLORIDE: 9 INJECTION, SOLUTION INTRAVENOUS at 14:22

## 2018-06-19 RX ADMIN — HEPARIN SODIUM 2500 UNITS: 1000 INJECTION, SOLUTION INTRAVENOUS; SUBCUTANEOUS at 14:11

## 2018-06-19 RX ADMIN — NICOTINE 1 PATCH: 14 PATCH, EXTENDED RELEASE TOPICAL at 12:47

## 2018-06-19 RX ADMIN — SODIUM CHLORIDE, POTASSIUM CHLORIDE, SODIUM LACTATE AND CALCIUM CHLORIDE 9 ML/HR: 600; 310; 30; 20 INJECTION, SOLUTION INTRAVENOUS at 12:10

## 2018-06-19 RX ADMIN — DEXAMETHASONE SODIUM PHOSPHATE 4 MG: 4 INJECTION, SOLUTION INTRAMUSCULAR; INTRAVENOUS at 14:07

## 2018-06-19 RX ADMIN — ONDANSETRON 4 MG: 2 INJECTION INTRAMUSCULAR; INTRAVENOUS at 14:28

## 2018-06-19 RX ADMIN — ESMOLOL HYDROCHLORIDE 10 MG: 10 INJECTION INTRAVENOUS at 14:28

## 2018-06-19 RX ADMIN — LIDOCAINE HYDROCHLORIDE 0.5 ML: 10 INJECTION, SOLUTION EPIDURAL; INFILTRATION; INTRACAUDAL; PERINEURAL at 12:10

## 2018-06-19 RX ADMIN — PROPOFOL 120 MG: 10 INJECTION, EMULSION INTRAVENOUS at 13:54

## 2018-06-19 RX ADMIN — NICARDIPINE HYDROCHLORIDE 5 MG/HR: 0.1 INJECTION, SOLUTION INTRAVENOUS at 23:21

## 2018-06-19 RX ADMIN — SODIUM CHLORIDE 75 ML/HR: 4.5 INJECTION, SOLUTION INTRAVENOUS at 21:54

## 2018-06-19 RX ADMIN — SENNOSIDES 8.6 MG: 8.6 TABLET, FILM COATED ORAL at 21:48

## 2018-06-19 RX ADMIN — GLYCOPYRROLATE 0.4 MG: 0.2 INJECTION, SOLUTION INTRAMUSCULAR; INTRAVENOUS at 14:34

## 2018-06-19 RX ADMIN — FAMOTIDINE 20 MG: 20 TABLET ORAL at 12:13

## 2018-06-19 RX ADMIN — Medication 3 MG: at 14:34

## 2018-06-19 RX ADMIN — CEFUROXIME 1.5 G: 1.5 INJECTION, POWDER, FOR SOLUTION INTRAVENOUS at 21:47

## 2018-06-19 RX ADMIN — ROCURONIUM BROMIDE 30 MG: 10 SOLUTION INTRAVENOUS at 13:54

## 2018-06-19 RX ADMIN — CEFAZOLIN SODIUM 2 G: 2 INJECTION, SOLUTION INTRAVENOUS at 14:00

## 2018-06-19 NOTE — ANESTHESIA PROCEDURE NOTES
Airway  Urgency: elective    Airway not difficult    General Information and Staff    Patient location during procedure: OR  CRNA: YARELI VERA    Indications and Patient Condition  Indications for airway management: airway protection    Preoxygenated: yes  MILS not maintained throughout  Mask difficulty assessment: 1 - vent by mask    Final Airway Details  Final airway type: endotracheal airway      Successful airway: ETT  Cuffed: yes   Successful intubation technique: direct laryngoscopy  Facilitating devices/methods: intubating stylet and anterior pressure/BURP  Endotracheal tube insertion site: oral  Blade: Gosia  Blade size: #3  ETT size: 7.0 mm  Cormack-Lehane Classification: grade IIa - partial view of glottis  Placement verified by: chest auscultation and capnometry   Measured from: lips  ETT to lips (cm): 20  Number of attempts at approach: 1    Additional Comments  Negative epigastric sounds, Breath sound equal bilaterally with symmetric chest rise and fall

## 2018-06-19 NOTE — ANESTHESIA PREPROCEDURE EVALUATION
Anesthesia Evaluation                  Airway   Mallampati: II  Dental      Pulmonary    Cardiovascular     (+) hypertension, past MI , PVD,       Neuro/Psych  GI/Hepatic/Renal/Endo    (+)  hiatal hernia,      Musculoskeletal     Abdominal    Substance History      OB/GYN          Other                        Anesthesia Plan    ASA 3     MAC     intravenous induction   Anesthetic plan and risks discussed with patient.

## 2018-06-19 NOTE — ANESTHESIA POSTPROCEDURE EVALUATION
Patient: Lyndsay Franco    Procedure Summary     Date:  06/19/18 Room / Location:   [facility] OR 15 /  [facility] OR 15    Anesthesia Start:  1347 Anesthesia Stop:      Procedure:  AORTAGRAM WITH OR WITHOUT RUNOFFS POSSIBLE STENT, RIGHT FEM CUT DOWN (N/A Abdomen) Diagnosis:       PAD (peripheral artery disease)      (PAD (peripheral artery disease) [I73.9])    Provider:  Quang Mcallister MD Provider:  Braxton Buck MD    Anesthesia Type:  MAC ASA Status:  3          Anesthesia Type: MAC  Last vitals  BP   136/76 (06/19/18 1453)   Temp   97.6 °F (36.4 °C) (06/19/18 1453)   Pulse   76 (06/19/18 1453)   Resp   15 (06/19/18 1453)     SpO2   97 % (06/19/18 1453)     Post Anesthesia Care and Evaluation    Patient location during evaluation: PACU  Patient participation: complete - patient participated  Level of consciousness: awake and alert  Pain score: 0  Pain management: adequate  Airway patency: patent  Anesthetic complications: No anesthetic complications  PONV Status: none  Cardiovascular status: hemodynamically stable and acceptable  Respiratory status: nonlabored ventilation, acceptable and nasal cannula  Hydration status: acceptable

## 2018-06-20 VITALS
OXYGEN SATURATION: 94 % | TEMPERATURE: 98.3 F | SYSTOLIC BLOOD PRESSURE: 105 MMHG | HEIGHT: 64 IN | BODY MASS INDEX: 30.22 KG/M2 | WEIGHT: 177 LBS | RESPIRATION RATE: 18 BRPM | DIASTOLIC BLOOD PRESSURE: 52 MMHG | HEART RATE: 78 BPM

## 2018-06-20 LAB
ANION GAP SERPL CALCULATED.3IONS-SCNC: 11 MMOL/L (ref 3–11)
BASOPHILS # BLD AUTO: 0.02 10*3/MM3 (ref 0–0.2)
BASOPHILS NFR BLD AUTO: 0.1 % (ref 0–1)
BUN BLD-MCNC: 17 MG/DL (ref 9–23)
BUN/CREAT SERPL: 18.7 (ref 7–25)
CALCIUM SPEC-SCNC: 9.2 MG/DL (ref 8.7–10.4)
CHLORIDE SERPL-SCNC: 105 MMOL/L (ref 99–109)
CO2 SERPL-SCNC: 22 MMOL/L (ref 20–31)
CREAT BLD-MCNC: 0.91 MG/DL (ref 0.6–1.3)
DEPRECATED RDW RBC AUTO: 48.3 FL (ref 37–54)
EOSINOPHIL # BLD AUTO: 0.02 10*3/MM3 (ref 0–0.3)
EOSINOPHIL NFR BLD AUTO: 0.1 % (ref 0–3)
ERYTHROCYTE [DISTWIDTH] IN BLOOD BY AUTOMATED COUNT: 14.2 % (ref 11.3–14.5)
GFR SERPL CREATININE-BSD FRML MDRD: 62 ML/MIN/1.73
GLUCOSE BLD-MCNC: 167 MG/DL (ref 70–100)
GLUCOSE BLDC GLUCOMTR-MCNC: 181 MG/DL (ref 70–130)
HCT VFR BLD AUTO: 37.1 % (ref 34.5–44)
HGB BLD-MCNC: 11.6 G/DL (ref 11.5–15.5)
IMM GRANULOCYTES # BLD: 0.05 10*3/MM3 (ref 0–0.03)
IMM GRANULOCYTES NFR BLD: 0.4 % (ref 0–0.6)
LYMPHOCYTES # BLD AUTO: 1.31 10*3/MM3 (ref 0.6–4.8)
LYMPHOCYTES NFR BLD AUTO: 9.5 % (ref 24–44)
MCH RBC QN AUTO: 29.1 PG (ref 27–31)
MCHC RBC AUTO-ENTMCNC: 31.3 G/DL (ref 32–36)
MCV RBC AUTO: 93.2 FL (ref 80–99)
MONOCYTES # BLD AUTO: 0.66 10*3/MM3 (ref 0–1)
MONOCYTES NFR BLD AUTO: 4.8 % (ref 0–12)
NEUTROPHILS # BLD AUTO: 11.74 10*3/MM3 (ref 1.5–8.3)
NEUTROPHILS NFR BLD AUTO: 85.1 % (ref 41–71)
PLAT MORPH BLD: NORMAL
PLATELET # BLD AUTO: 318 10*3/MM3 (ref 150–450)
PMV BLD AUTO: 10.6 FL (ref 6–12)
POTASSIUM BLD-SCNC: 4.3 MMOL/L (ref 3.5–5.5)
RBC # BLD AUTO: 3.98 10*6/MM3 (ref 3.89–5.14)
RBC MORPH BLD: NORMAL
SODIUM BLD-SCNC: 138 MMOL/L (ref 132–146)
WBC MORPH BLD: NORMAL
WBC NRBC COR # BLD: 13.8 10*3/MM3 (ref 3.5–10.8)

## 2018-06-20 PROCEDURE — 63710000001 CLONIDINE 0.1 MG TABLET: Performed by: THORACIC SURGERY (CARDIOTHORACIC VASCULAR SURGERY)

## 2018-06-20 PROCEDURE — A9270 NON-COVERED ITEM OR SERVICE: HCPCS | Performed by: THORACIC SURGERY (CARDIOTHORACIC VASCULAR SURGERY)

## 2018-06-20 PROCEDURE — 82962 GLUCOSE BLOOD TEST: CPT

## 2018-06-20 PROCEDURE — 63710000001 PANTOPRAZOLE 40 MG TABLET DELAYED-RELEASE: Performed by: THORACIC SURGERY (CARDIOTHORACIC VASCULAR SURGERY)

## 2018-06-20 PROCEDURE — 63710000001 HYDROCODONE-ACETAMINOPHEN 7.5-325 MG TABLET: Performed by: THORACIC SURGERY (CARDIOTHORACIC VASCULAR SURGERY)

## 2018-06-20 PROCEDURE — 85025 COMPLETE CBC W/AUTO DIFF WBC: CPT | Performed by: THORACIC SURGERY (CARDIOTHORACIC VASCULAR SURGERY)

## 2018-06-20 PROCEDURE — 63710000001 AMLODIPINE 5 MG TABLET: Performed by: THORACIC SURGERY (CARDIOTHORACIC VASCULAR SURGERY)

## 2018-06-20 PROCEDURE — 63710000001 LOSARTAN 50 MG TABLET 100 EACH BOX: Performed by: THORACIC SURGERY (CARDIOTHORACIC VASCULAR SURGERY)

## 2018-06-20 PROCEDURE — G0378 HOSPITAL OBSERVATION PER HR: HCPCS

## 2018-06-20 PROCEDURE — 63710000001 METOPROLOL SUCCINATE XL 100 MG TABLET SUSTAINED-RELEASE 24 HOUR: Performed by: THORACIC SURGERY (CARDIOTHORACIC VASCULAR SURGERY)

## 2018-06-20 PROCEDURE — 63710000001 ALPRAZOLAM 0.5 MG TABLET: Performed by: THORACIC SURGERY (CARDIOTHORACIC VASCULAR SURGERY)

## 2018-06-20 PROCEDURE — 63710000001 HYDROCHLOROTHIAZIDE 25 MG TABLET 1,000 EACH BOTTLE: Performed by: THORACIC SURGERY (CARDIOTHORACIC VASCULAR SURGERY)

## 2018-06-20 PROCEDURE — 25010000002 CEFUROXIME: Performed by: THORACIC SURGERY (CARDIOTHORACIC VASCULAR SURGERY)

## 2018-06-20 PROCEDURE — 85007 BL SMEAR W/DIFF WBC COUNT: CPT | Performed by: THORACIC SURGERY (CARDIOTHORACIC VASCULAR SURGERY)

## 2018-06-20 PROCEDURE — 63710000001 CETIRIZINE 10 MG TABLET: Performed by: THORACIC SURGERY (CARDIOTHORACIC VASCULAR SURGERY)

## 2018-06-20 PROCEDURE — 99224 PR SBSQ OBSERVATION CARE/DAY 15 MINUTES: CPT | Performed by: THORACIC SURGERY (CARDIOTHORACIC VASCULAR SURGERY)

## 2018-06-20 PROCEDURE — 80048 BASIC METABOLIC PNL TOTAL CA: CPT | Performed by: THORACIC SURGERY (CARDIOTHORACIC VASCULAR SURGERY)

## 2018-06-20 PROCEDURE — 63710000001 CLOPIDOGREL 75 MG TABLET: Performed by: THORACIC SURGERY (CARDIOTHORACIC VASCULAR SURGERY)

## 2018-06-20 RX ORDER — NICOTINE 21 MG/24HR
1 PATCH, TRANSDERMAL 24 HOURS TRANSDERMAL
Status: DISCONTINUED | OUTPATIENT
Start: 2018-06-20 | End: 2018-06-20 | Stop reason: HOSPADM

## 2018-06-20 RX ORDER — NICOTINE 21 MG/24HR
1 PATCH, TRANSDERMAL 24 HOURS TRANSDERMAL EVERY 24 HOURS
Qty: 14 PATCH | Refills: 0 | Status: SHIPPED | OUTPATIENT
Start: 2018-06-20 | End: 2018-06-28

## 2018-06-20 RX ADMIN — AMLODIPINE BESYLATE 5 MG: 5 TABLET ORAL at 09:38

## 2018-06-20 RX ADMIN — CLONIDINE HYDROCHLORIDE 0.1 MG: 0.1 TABLET ORAL at 04:57

## 2018-06-20 RX ADMIN — METOPROLOL SUCCINATE 100 MG: 100 TABLET, EXTENDED RELEASE ORAL at 09:38

## 2018-06-20 RX ADMIN — HYDROCODONE BITARTRATE AND ACETAMINOPHEN 1 TABLET: 7.5; 325 TABLET ORAL at 04:57

## 2018-06-20 RX ADMIN — CEFUROXIME 1.5 G: 1.5 INJECTION, POWDER, FOR SOLUTION INTRAVENOUS at 04:56

## 2018-06-20 RX ADMIN — NICARDIPINE HYDROCHLORIDE 5 MG/HR: 0.1 INJECTION, SOLUTION INTRAVENOUS at 03:40

## 2018-06-20 RX ADMIN — CLOPIDOGREL BISULFATE 75 MG: 75 TABLET ORAL at 09:37

## 2018-06-20 RX ADMIN — PANTOPRAZOLE SODIUM 40 MG: 40 TABLET, DELAYED RELEASE ORAL at 04:58

## 2018-06-20 RX ADMIN — CETIRIZINE HYDROCHLORIDE 10 MG: 10 TABLET, FILM COATED ORAL at 09:37

## 2018-06-20 RX ADMIN — HYDROCHLOROTHIAZIDE: 25 TABLET ORAL at 09:36

## 2018-06-20 RX ADMIN — ALPRAZOLAM 0.5 MG: 0.5 TABLET ORAL at 00:41

## 2018-06-28 ENCOUNTER — TELEPHONE (OUTPATIENT)
Dept: CARDIOLOGY | Facility: CLINIC | Age: 68
End: 2018-06-28

## 2018-06-28 ENCOUNTER — OFFICE VISIT (OUTPATIENT)
Dept: CARDIOLOGY | Facility: CLINIC | Age: 68
End: 2018-06-28

## 2018-06-28 VITALS
OXYGEN SATURATION: 95 % | SYSTOLIC BLOOD PRESSURE: 110 MMHG | WEIGHT: 177.4 LBS | BODY MASS INDEX: 30.29 KG/M2 | DIASTOLIC BLOOD PRESSURE: 58 MMHG | HEART RATE: 81 BPM | HEIGHT: 64 IN

## 2018-06-28 DIAGNOSIS — R06.02 SHORTNESS OF BREATH: Primary | ICD-10-CM

## 2018-06-28 DIAGNOSIS — I10 ESSENTIAL HYPERTENSION: ICD-10-CM

## 2018-06-28 DIAGNOSIS — I25.10 CORONARY ARTERY DISEASE INVOLVING NATIVE CORONARY ARTERY OF NATIVE HEART WITHOUT ANGINA PECTORIS: ICD-10-CM

## 2018-06-28 PROCEDURE — 99214 OFFICE O/P EST MOD 30 MIN: CPT | Performed by: PHYSICIAN ASSISTANT

## 2018-06-28 RX ORDER — SULFAMETHOXAZOLE AND TRIMETHOPRIM 800; 160 MG/1; MG/1
1 TABLET ORAL 2 TIMES DAILY
Qty: 20 TABLET | Refills: 0 | Status: SHIPPED | OUTPATIENT
Start: 2018-06-28 | End: 2019-03-06

## 2018-06-28 RX ORDER — MELOXICAM 15 MG/1
15 TABLET ORAL DAILY
Qty: 30 TABLET | Refills: 6 | Status: SHIPPED | OUTPATIENT
Start: 2018-06-28 | End: 2019-01-15 | Stop reason: SDUPTHER

## 2018-06-28 NOTE — PROGRESS NOTES
Problem list     Subjective   Lyndsay Franco is a 68 y.o. female     Chief Complaint   Patient presents with   • Coronary Artery Disease     presents as a follow up   • Hypertension   • Hyperlipidemia       HPI    Problem list  1.  Coronary artery disease  1.1 history of stenting in 2004 and  2016, inadequate data  1.2 stress test April 2018 demonstrates no evidence of ischemia and preserved LV function  2.  Peripheral vascular disease  2.1 history of iliac stenting bilaterally and slight 2.2 follow-up femorofemoral bypass by Dr. Dupont at [facility] in [place]  2.2 follow-up CT angiogram May 2018 demonstrates occluded iliacs with femorofemoral bypass noted but no discussion on report in regards to patency or disease  2.3 intermittent claudication  2.4 questionable left renal artery stenosis  2.5 right renal artery stenting June 2018 by Dr. Mcallister and Dr. Dupont with no significant disease of the lower extremities identified  3.  Labile hypertension  4.  Infrarenal abdominal aortic aneurysm measuring 3 cm by CT scan May 2018  5.  Type 2 diabetes mellitus  6.  Dyslipidemia  7.  Chronic tobacco use    Patient is a 68-year-old female that presents back for follow-up.  She has done remarkably well since stenting.  Her blood pressure has even improved.  She has noticed it being low at times.  She does not have any chest pain or pressure.  Shortness of breath is moderate at baseline but she continues to smoke.  She does raise interest wanting Chantix.  She denies PND orthopnea.  She doesn't palpitate or have dysrhythmic symptoms.  Otherwise is doing well           Outpatient Encounter Prescriptions as of 6/28/2018   Medication Sig Dispense Refill   • ALPRAZolam (XANAX) 0.5 MG tablet Take 0.5 mg by mouth 2 (Two) Times a Day As Needed.     • amLODIPine (NORVASC) 5 MG tablet Take 1 tablet by mouth 2 (Two) Times a Day. 60 tablet 4   • atorvastatin (LIPITOR) 40 MG tablet Take 40 mg by mouth Daily.     • cetirizine  (zyrTEC) 10 MG tablet Take 10 mg by mouth Daily.     • CloNIDine (CATAPRES) 0.1 MG tablet Take 1 tablet by mouth 3 (Three) Times a Day. 90 tablet 4   • clopidogrel (PLAVIX) 75 MG tablet Take 75 mg by mouth Daily.     • HYDROcodone-acetaminophen (NORCO)  MG per tablet prn     • losartan-hydrochlorothiazide (HYZAAR) 100-25 MG per tablet Take 1 tablet by mouth Daily. 30 tablet 4   • metFORMIN (GLUCOPHAGE) 500 MG tablet 2 (Two) Times a Day.     • metoprolol succinate XL (TOPROL-XL) 100 MG 24 hr tablet Daily.     • nitroglycerin (NITROSTAT) 0.4 MG SL tablet As Needed.     • pantoprazole (PROTONIX) 40 MG EC tablet Daily.     • senna (SENOKOT) 8.6 MG tablet tablet Take  by mouth Every Night.     • [DISCONTINUED] nicotine (NICODERM CQ) 14 MG/24HR patch Place 1 patch on the skin Daily for 14 doses. 14 patch 0   • meloxicam (MOBIC) 15 MG tablet Take 1 tablet by mouth Daily. 30 tablet 6   • sulfamethoxazole-trimethoprim (BACTRIM DS) 800-160 MG per tablet Take 1 tablet by mouth 2 (Two) Times a Day. 20 tablet 0     No facility-administered encounter medications on file as of 6/28/2018.        Patient has no known allergies.    Past Medical History:   Diagnosis Date   • Anxiety    • Arthritis    • Coronary artery disease    • Depression    • Diabetes mellitus    • GERD (gastroesophageal reflux disease)    • Hiatal hernia    • Hyperlipidemia    • Hypertension    • Myocardial infarction    • Non-functioning kidney     Left    • Peripheral vascular disease    • Renal artery aneurysm    • Systolic ejection murmur 3/27/2018       Social History     Social History   • Marital status:      Spouse name: N/A   • Number of children: 3   • Years of education: N/A     Occupational History   • [employer]      Disabled-PVD     Social History Main Topics   • Smoking status: Current Every Day Smoker     Packs/day: 1.00     Years: 40.00     Types: Cigarettes   • Smokeless tobacco: Never Used   • Alcohol use No  "  • Drug use: No   • Sexual activity: Defer     Other Topics Concern   • Not on file     Social History Narrative    Lives in [place] alone.        Family History   Problem Relation Age of Onset   • Adopted: Yes   • No Known Problems Mother    • No Known Problems Father        Review of Systems   Constitutional: Positive for fatigue.   HENT: Negative.    Eyes: Positive for visual disturbance (wears glasses ).   Respiratory: Positive for shortness of breath.    Cardiovascular: Negative for chest pain, palpitations and leg swelling.   Gastrointestinal: Positive for constipation. Negative for diarrhea.   Endocrine: Negative.    Genitourinary: Negative for difficulty urinating.   Musculoskeletal: Positive for arthralgias and myalgias (numbness is part of leg ).   Skin: Negative.    Allergic/Immunologic: Negative for environmental allergies and food allergies.   Neurological: Positive for dizziness and light-headedness.   Hematological: Bruises/bleeds easily.   Psychiatric/Behavioral: The patient is nervous/anxious.    All other systems reviewed and are negative.      Objective   Vitals:    06/28/18 1341   BP: 110/58   BP Location: Left arm   Patient Position: Sitting   Pulse: 81   SpO2: 95%   Weight: 80.5 kg (177 lb 6.4 oz)   Height: 162.6 cm (64\")      /58 (BP Location: Left arm, Patient Position: Sitting)   Pulse 81   Ht 162.6 cm (64\")   Wt 80.5 kg (177 lb 6.4 oz)   SpO2 95%   BMI 30.45 kg/m²     Lab Results (most recent)     None          Physical Exam   Constitutional: She is oriented to person, place, and time. She appears well-developed and well-nourished. No distress.   HENT:   Head: Normocephalic and atraumatic.   Eyes: EOM are normal. Pupils are equal, round, and reactive to light.   Neck: No JVD present. Carotid bruit is present.   Cardiovascular: Normal rate, regular rhythm, normal heart sounds and intact distal pulses.  Exam reveals no gallop and no friction rub.    No murmur " heard.  Pulmonary/Chest: Effort normal and breath sounds normal. No respiratory distress. She has no wheezes. She has no rales. She exhibits no tenderness.   Musculoskeletal: Normal range of motion. She exhibits no edema.   Neurological: She is alert and oriented to person, place, and time. No cranial nerve deficit.   Skin: Skin is warm and dry. No rash noted. No erythema. No pallor.   Psychiatric: She has a normal mood and affect. Her behavior is normal.   Nursing note and vitals reviewed.      Procedure   Procedures       Assessment/Plan     Problems Addressed this Visit        Cardiovascular and Mediastinum    Coronary artery disease involving native coronary artery of native heart without angina pectoris    Essential hypertension       Respiratory    Shortness of breath - Primary            Recommendation  1.  Patient is doing well at this time.  No symptoms of angina, failure, or arrhythmia.  Recent stress test negative.  2.  Evaluation of patient's wound site, it does not appear infected at this time.  There is no surrounding cellulitis.  Wound is approximated well and there is no discharge.  Patient raises concern about the possibility of infection because of previous intervention subsequently led to a staph infection.  I will empirically give her Bactrim which will cover MRSA as that she is concerned.  She is to see CT surgery next week.  I did discuss with her that if it becomes reddened or develops discharge, to contact our office.  3.  Otherwise, we will see her back for follow-up in 6 months or sooner symptoms discussed.  She will call us back in one week if blood pressure continues to remain low.  We are decreasing amlodipine to 5 mg.  We will see her back for follow-up as scheduled          I advised Lyndsay of the risks of continuing to use tobacco, and I provided her with tobacco cessation educational materials in the After Visit Summary.     During this visit, I spent <3 minutes counseling the  patient regarding tobacco cessation.     Patient's Body mass index is 30.45 kg/m². BMI is above normal parameters. Recommendations include: educational material.       Electronically signed by:

## 2018-06-28 NOTE — PATIENT INSTRUCTIONS
Obesity, Adult  Obesity is the condition of having too much total body fat. Being overweight or obese means that your weight is greater than what is considered healthy for your body size. Obesity is determined by a measurement called BMI. BMI is an estimate of body fat and is calculated from height and weight. For adults, a BMI of 30 or higher is considered obese.  Obesity can eventually lead to other health concerns and major illnesses, including:  · Stroke.  · Coronary artery disease (CAD).  · Type 2 diabetes.  · Some types of cancer, including cancers of the colon, breast, uterus, and gallbladder.  · Osteoarthritis.  · High blood pressure (hypertension).  · High cholesterol.  · Sleep apnea.  · Gallbladder stones.  · Infertility problems.    What are the causes?  The main cause of obesity is taking in (consuming) more calories than your body uses for energy. Other factors that contribute to this condition may include:  · Being born with genes that make you more likely to become obese.  · Having a medical condition that causes obesity. These conditions include:  ? Hypothyroidism.  ? Polycystic ovarian syndrome (PCOS).  ? Binge-eating disorder.  ? Cushing syndrome.  · Taking certain medicines, such as steroids, antidepressants, and seizure medicines.  · Not being physically active (sedentary lifestyle).  · Living where there are limited places to exercise safely or buy healthy foods.  · Not getting enough sleep.    What increases the risk?  The following factors may increase your risk of this condition:  · Having a family history of obesity.  · Being a woman of -American descent.  · Being a man of  descent.    What are the signs or symptoms?  Having excessive body fat is the main symptom of this condition.  How is this diagnosed?  This condition may be diagnosed based on:  · Your symptoms.  · Your medical history.  · A physical exam. Your health care provider may measure:  ? Your BMI. If you are an  adult with a BMI between 25 and less than 30, you are considered overweight. If you are an adult with a BMI of 30 or higher, you are considered obese.  ? The distances around your hips and your waist (circumferences). These may be compared to each other to help diagnose your condition.  ? Your skinfold thickness. Your health care provider may gently pinch a fold of your skin and measure it.    How is this treated?  Treatment for this condition often includes changing your lifestyle. Treatment may include some or all of the following:  · Dietary changes. Work with your health care provider and a dietitian to set a weight-loss goal that is healthy and reasonable for you. Dietary changes may include eating:  ? Smaller portions. A portion size is the amount of a particular food that is healthy for you to eat at one time. This varies from person to person.  ? Low-calorie or low-fat options.  ? More whole grains, fruits, and vegetables.  · Regular physical activity. This may include aerobic activity (cardio) and strength training.  · Medicine to help you lose weight. Your health care provider may prescribe medicine if you are unable to lose 1 pound a week after 6 weeks of eating more healthily and doing more physical activity.  · Surgery. Surgical options may include gastric banding and gastric bypass. Surgery may be done if:  ? Other treatments have not helped to improve your condition.  ? You have a BMI of 40 or higher.  ? You have life-threatening health problems related to obesity.    Follow these instructions at home:    Eating and drinking    · Follow recommendations from your health care provider about what you eat and drink. Your health care provider may advise you to:  ? Limit fast foods, sweets, and processed snack foods.  ? Choose low-fat options, such as low-fat milk instead of whole milk.  ? Eat 5 or more servings of fruits or vegetables every day.  ? Eat at home more often. This gives you more control over  what you eat.  ? Choose healthy foods when you eat out.  ? Learn what a healthy portion size is.  ? Keep low-fat snacks on hand.  ? Avoid sugary drinks, such as soda, fruit juice, iced tea sweetened with sugar, and flavored milk.  ? Eat a healthy breakfast.  · Drink enough water to keep your urine clear or pale yellow.  · Do not go without eating for long periods of time (do not fast) or follow a fad diet. Fasting and fad diets can be unhealthy and even dangerous.  Physical Activity  · Exercise regularly, as told by your health care provider. Ask your health care provider what types of exercise are safe for you and how often you should exercise.  · Warm up and stretch before being active.  · Cool down and stretch after being active.  · Rest between periods of activity.  Lifestyle  · Limit the time that you spend in front of your TV, computer, or video game system.  · Find ways to reward yourself that do not involve food.  · Limit alcohol intake to no more than 1 drink a day for nonpregnant women and 2 drinks a day for men. One drink equals 12 oz of beer, 5 oz of wine, or 1½ oz of hard liquor.  General instructions  · Keep a weight loss journal to keep track of the food you eat and how much you exercise you get.  · Take over-the-counter and prescription medicines only as told by your health care provider.  · Take vitamins and supplements only as told by your health care provider.  · Consider joining a support group. Your health care provider may be able to recommend a support group.  · Keep all follow-up visits as told by your health care provider. This is important.  Contact a health care provider if:  · You are unable to meet your weight loss goal after 6 weeks of dietary and lifestyle changes.  This information is not intended to replace advice given to you by your health care provider. Make sure you discuss any questions you have with your health care provider.  Document Released: 01/25/2006 Document Revised:  05/22/2017 Document Reviewed: 10/05/2016  EPIS Interactive Patient Education © 2018 [employer]  MyPlate from [employer]  The general, healthful diet is based on the 2010 Dietary Guidelines for Americans. The amount of food you need to eat from each food group depends on your age, sex, and level of physical activity and can be individualized by a dietitian. Go to ChooseMyPlate.gov for more information.  What do I need to know about the MyPlate plan?  · Enjoy your food, but eat less.  · Avoid oversized portions.  ? ½ of your plate should include fruits and vegetables.  ? ¼ of your plate should be grains.  ? ¼ of your plate should be protein.  Grains  · Make at least half of your grains whole grains.  · For a 2,000 calorie daily food plan, eat 6 oz every day.  · 1 oz is about 1 slice bread, 1 cup cereal, or ½ cup cooked rice, cereal, or pasta.  Vegetables  · Make half your plate fruits and vegetables.  · For a 2,000 calorie daily food plan, eat 2½ cups every day.  · 1 cup is about 1 cup raw or cooked vegetables or vegetable juice or 2 cups raw leafy greens.  Fruits  · Make half your plate fruits and vegetables.  · For a 2,000 calorie daily food plan, eat 2 cups every day.  · 1 cup is about 1 cup fruit or 100% fruit juice or ½ cup dried fruit.  Protein  · For a 2,000 calorie daily food plan, eat 5½ oz every day.  · 1 oz is about 1 oz meat, poultry, or fish, ¼ cup cooked beans, 1 egg, 1 Tbsp peanut butter, or ½ oz nuts or seeds.  Dairy  · Switch to fat-free or low-fat (1%) milk.  · For a 2,000 calorie daily food plan, eat 3 cups every day.  · 1 cup is about 1 cup milk or yogurt or soy milk (soy beverage), 1½ oz natural cheese, or 2 oz processed cheese.  Fats, Oils, and Empty Calories  · Only small amounts of oils are recommended.  · Empty calories are calories from solid fats or added sugars.  · Compare sodium in foods like soup, bread, and frozen meals. Choose the foods with lower numbers.  · Drink water instead of  sugary drinks.  What foods can I eat?  Grains  Whole grains such as whole wheat, quinoa, millet, and bulgur. Bread, rolls, and pasta made from whole grains. Brown or wild rice. Hot or cold cereals made from whole grains and without added sugar.  Vegetables  All fresh vegetables, especially fresh red, dark green, or orange vegetables. Peas and beans. Low-sodium frozen or canned vegetables prepared without added salt. Low-sodium vegetable juices.  Fruits  All fresh, frozen, and dried fruits. Canned fruit packed in water or fruit juice without added sugar. Fruit juices without added sugar.  Meats and Other Protein Sources  Boiled, baked, or grilled lean meat trimmed of fat. Skinless poultry. Fresh seafood and shellfish. Canned seafood packed in water. Unsalted nuts and unsalted nut butters. Tofu. Dried beans and pea. Eggs.  Dairy  Low-fat or fat-free milk, yogurt, and cheeses.  Sweets and Desserts  Frozen desserts made from low-fat milk.  Fats and Oils  Olive, peanut, and canola oils and margarine. Salad dressing and mayonnaise made from these oils.  Other  Soups and casseroles made from allowed ingredients and without added fat or salt.  The items listed above may not be a complete list of recommended foods or beverages. Contact your dietitian for more options.  What foods are not recommended?  Grains  Sweetened, low-fiber cereals. Packaged baked goods. Snack crackers and chips. Cheese crackers, butter crackers, and biscuits. Frozen waffles, sweet breads, doughnuts, pastries, packaged baking mixes, pancakes, cakes, and cookies.  Vegetables  Regular canned or frozen vegetables or vegetables prepared with salt. Canned tomatoes. Canned tomato sauce. Fried vegetables. Vegetables in cream sauce or cheese sauce.  Fruits  Fruits packed in syrup or made with added sugar.  Meats and Other Protein Sources  Marbled or fatty meats such as ribs. Poultry with skin. Fried meats, poultry, eggs, or fish. Sausages, hot dogs, and deli  meats such as pastrami, bologna, or salami.  Dairy  Whole milk, cream, cheeses made from whole milk, sour cream. Ice cream or yogurt made from whole milk or with added sugar.  Beverages  For adults, no more than one alcoholic drink per day. Regular soft drinks or other sugary beverages. Juice drinks.  Sweets and Desserts  Sugary or fatty desserts, candy, and other sweets.  Fats and Oils  Solid shortening or partially hydrogenated oils. Solid margarine. Margarine that contains trans fats. Butter.  The items listed above may not be a complete list of foods and beverages to avoid. Contact your dietitian for more information.  This information is not intended to replace advice given to you by your health care provider. Make sure you discuss any questions you have with your health care provider.  Document Released: 01/06/2009 Document Revised: 05/25/2017 Document Reviewed: 11/26/2014  [employer] Interactive Patient Education © 2018 [employer]

## 2018-07-05 ENCOUNTER — OFFICE VISIT (OUTPATIENT)
Dept: CARDIAC SURGERY | Facility: CLINIC | Age: 68
End: 2018-07-05

## 2018-07-05 VITALS
OXYGEN SATURATION: 96 % | HEART RATE: 79 BPM | HEIGHT: 64 IN | WEIGHT: 180.8 LBS | BODY MASS INDEX: 30.87 KG/M2 | TEMPERATURE: 97.9 F | DIASTOLIC BLOOD PRESSURE: 63 MMHG | SYSTOLIC BLOOD PRESSURE: 127 MMHG

## 2018-07-05 DIAGNOSIS — I70.1 RENAL ARTERY STENOSIS (HCC): ICD-10-CM

## 2018-07-05 DIAGNOSIS — I73.9 PAD (PERIPHERAL ARTERY DISEASE) (HCC): Primary | ICD-10-CM

## 2018-07-05 PROCEDURE — 99024 POSTOP FOLLOW-UP VISIT: CPT | Performed by: THORACIC SURGERY (CARDIOTHORACIC VASCULAR SURGERY)

## 2018-07-05 NOTE — PROGRESS NOTES
"Patient Information  Lyndsay Franco                                                                                          [address] [place] [id number]      1950  [phone]      Chief Complaint   Patient presents with   • Follow-up     Hospital follow up s/p renal artery stent       History of Present Illness:Patient seen today in the [place] office for follow-up of a recent right renal artery stent for severe right renal artery stenosis with malignant hypertension.  The stent was placed by a partner Dr. Mcallister with my assistance.  She comes back now for her first post operative follow-up.  The procedure was done through a cutdown in the right superficial femoral artery since she had a previous femoral-femoral bypass which was still patent.  The femoral-femoral bypass been done approxi-9 years ago by me.    Vitals:    07/05/18 1440   BP: 127/63   BP Location: Right arm   Patient Position: Sitting   Pulse: 79   Temp: 97.9 °F (36.6 °C)   SpO2: 96%   Weight: 82 kg (180 lb 12.8 oz)   Height: 162.6 cm (64\")        Physical Exam examination today blood pressure as noted above.  The cutdown in the right anterior thigh area was healing well with a small amount of eschar present.  The patient had bilateral pedal pulses palpable    Lab/other results: No laboratory to review today    Assessment: #1.  Status post right renal artery stent on 06/19/2018.  Hypertension appears to be  improved and one of her antihypertensive medications, amlodipine, has since been decreased to 5 mg daily from twice a day    Plan: I will plan to see the patient back in approximately 6 weeks for another follow-up visit.  Hopefully further antihypertensive medication adjustments can be made by her local primary care physicians in the interim.    Jose Dupont M.D."

## 2018-08-30 ENCOUNTER — OFFICE VISIT (OUTPATIENT)
Dept: CARDIAC SURGERY | Facility: CLINIC | Age: 68
End: 2018-08-30

## 2018-08-30 VITALS
TEMPERATURE: 97.7 F | HEIGHT: 64 IN | DIASTOLIC BLOOD PRESSURE: 71 MMHG | BODY MASS INDEX: 31.86 KG/M2 | SYSTOLIC BLOOD PRESSURE: 146 MMHG | WEIGHT: 186.6 LBS | HEART RATE: 64 BPM | OXYGEN SATURATION: 96 %

## 2018-08-30 DIAGNOSIS — I70.1 RENAL ARTERY STENOSIS (HCC): ICD-10-CM

## 2018-08-30 DIAGNOSIS — I73.9 PAD (PERIPHERAL ARTERY DISEASE) (HCC): Primary | ICD-10-CM

## 2018-08-30 PROCEDURE — 99212 OFFICE O/P EST SF 10 MIN: CPT | Performed by: THORACIC SURGERY (CARDIOTHORACIC VASCULAR SURGERY)

## 2018-08-30 NOTE — PROGRESS NOTES
"Patient Information  Lyndsay [place]on [place] [address]      1950  [phone]      Chief Complaint   Patient presents with   • Renal Artery Stenosis     6 week follow-up s/p right renal artery stent        History of Present Illness: Patient seen today in follow-up for a right renal artery stent performed on 06/19/2018 by Dr. Mcallister and myself.  This procedure was done for malignant hypertension with severe right renal artery stenosis.  I performed a previous femoral-femoral bypass for left leg ischemia some 9 years ago    Vitals:    08/30/18 1408   BP: 146/71   BP Location: Right arm   Patient Position: Sitting   Pulse: 64   Temp: 97.7 °F (36.5 °C)   SpO2: 96%   Weight: 84.6 kg (186 lb 9.6 oz)   Height: 162.6 cm (64\")        Physical Exam physical examination today reveals the cut down in the right side to access the right superficial femoral artery for placement  of the right renal artery stent to be well healed.  Her blood pressure as noted above is 146/71.  She had pulses in both feet that were palpable in the dorsalis pedis area.  Her feet were warm and clinically well perfused.    Lab/other results:    Assessment: #1.  Status post right renal artery stenting on 06/19/2018 for severe right renal artery stenosis and malignant hypertension.  Her local physicians in [place] have apparently been able to decrease some of her antihypertensive medications  #2.  Status post femoral-femoral bypass some 9 years ago with a functional graft at this time and well perfused both lower extremities with palpable pedal pulses    Plan: We will plan to follow up this patient in 6 months    Jose Dupont M.D.   "

## 2019-01-15 RX ORDER — METOPROLOL SUCCINATE 100 MG/1
100 TABLET, EXTENDED RELEASE ORAL DAILY
Qty: 90 TABLET | Refills: 3 | Status: SHIPPED | OUTPATIENT
Start: 2019-01-15 | End: 2019-05-08 | Stop reason: ALTCHOICE

## 2019-01-15 RX ORDER — MELOXICAM 15 MG/1
TABLET ORAL
Qty: 30 TABLET | Refills: 6 | Status: SHIPPED | OUTPATIENT
Start: 2019-01-15 | End: 2019-01-25 | Stop reason: SDUPTHER

## 2019-01-25 DIAGNOSIS — M19.90 ARTHRITIS: Primary | ICD-10-CM

## 2019-01-25 RX ORDER — MELOXICAM 15 MG/1
TABLET ORAL
Qty: 30 TABLET | Refills: 6 | Status: SHIPPED | OUTPATIENT
Start: 2019-01-25 | End: 2019-08-20 | Stop reason: SDUPTHER

## 2019-03-06 ENCOUNTER — OFFICE VISIT (OUTPATIENT)
Dept: CARDIOLOGY | Facility: CLINIC | Age: 69
End: 2019-03-06

## 2019-03-06 VITALS
HEART RATE: 75 BPM | SYSTOLIC BLOOD PRESSURE: 134 MMHG | HEIGHT: 64 IN | BODY MASS INDEX: 31.92 KG/M2 | WEIGHT: 187 LBS | OXYGEN SATURATION: 96 % | DIASTOLIC BLOOD PRESSURE: 66 MMHG

## 2019-03-06 DIAGNOSIS — I25.10 CORONARY ARTERY DISEASE INVOLVING NATIVE CORONARY ARTERY OF NATIVE HEART WITHOUT ANGINA PECTORIS: ICD-10-CM

## 2019-03-06 DIAGNOSIS — R09.89 BILATERAL CAROTID BRUITS: ICD-10-CM

## 2019-03-06 DIAGNOSIS — R06.02 SHORTNESS OF BREATH: Primary | ICD-10-CM

## 2019-03-06 DIAGNOSIS — R94.31 ABNORMAL EKG: ICD-10-CM

## 2019-03-06 DIAGNOSIS — R07.9 CHEST PAIN, UNSPECIFIED TYPE: ICD-10-CM

## 2019-03-06 PROCEDURE — 99214 OFFICE O/P EST MOD 30 MIN: CPT | Performed by: PHYSICIAN ASSISTANT

## 2019-03-06 RX ORDER — TRAZODONE HYDROCHLORIDE 50 MG/1
50 TABLET ORAL NIGHTLY
COMMUNITY

## 2019-03-06 RX ORDER — ISOSORBIDE MONONITRATE 30 MG/1
30 TABLET, EXTENDED RELEASE ORAL EVERY MORNING
Qty: 30 TABLET | Refills: 11 | Status: SHIPPED | OUTPATIENT
Start: 2019-03-06 | End: 2019-08-22 | Stop reason: DRUGHIGH

## 2019-03-06 RX ORDER — PRAVASTATIN SODIUM 20 MG
20 TABLET ORAL NIGHTLY
COMMUNITY
End: 2019-05-08 | Stop reason: ALTCHOICE

## 2019-03-06 RX ORDER — MONTELUKAST SODIUM 10 MG/1
10 TABLET ORAL NIGHTLY
COMMUNITY
End: 2019-09-04

## 2019-03-06 NOTE — PROGRESS NOTES
Problem list     Subjective   Lyndsay Franco is a 68 y.o. female     Chief Complaint   Patient presents with   • Shortness of Breath   • Chest Pain       HPI      Problem list  1.  Coronary artery disease  1.1 history of stenting in 2004 and  2016, inadequate data  1.2 stress test April 2018 demonstrates no evidence of ischemia and preserved LV function  2.  Peripheral vascular disease  2.1 history of iliac stenting bilaterally and slight 2.2 follow-up femorofemoral bypass by Dr. Dupont at [facility] in [place]  2.2 follow-up CT angiogram May 2018 demonstrates occluded iliacs with femorofemoral bypass noted but no discussion on report in regards to patency or disease  2.3 intermittent claudication  2.4 questionable left renal artery stenosis  2.5 right renal artery stenting June 2018 by Dr. Mcallister and Dr. Dupont with no significant disease of the lower extremities identified  3.  Labile hypertension  4.  Infrarenal abdominal aortic aneurysm measuring 3 cm by CT scan May 2018  5.  Type 2 diabetes mellitus  6.  Dyslipidemia  7.  Chronic tobacco use    Patient is a 68-year-old female who presents back to the office for follow-up.  Patient described a few weeks ago that she had symptoms of upper respiratory infection.  However, she continues to have discomfort in her chest.  She has no fever, rigors, or chills.  She describes feeling a substernal discomfort since that time and it has not resolved although she has improved.  This pressure and heaviness is concerning for her as it has been persistent if not mildly progressive.  She has mild to moderate levels of exertional dyspnea.  She is trying to quit smoking but does ask about possible Chantix to help.  She has no PND orthopnea.    She does not complain of palpitations or dysrhythmic symptoms.  She otherwise states she is doing well.            Outpatient Encounter Medications as of 3/6/2019   Medication Sig Dispense Refill   • ALPRAZolam (XANAX) 0.5 MG tablet  Take 0.5 mg by mouth 2 (Two) Times a Day As Needed.     • amLODIPine (NORVASC) 5 MG tablet Take 1 tablet by mouth 2 (Two) Times a Day. 60 tablet 4   • cetirizine (zyrTEC) 10 MG tablet Take 10 mg by mouth Daily.     • CloNIDine (CATAPRES) 0.1 MG tablet Take 1 tablet by mouth 3 (Three) Times a Day. 90 tablet 4   • clopidogrel (PLAVIX) 75 MG tablet Take 75 mg by mouth Daily.     • HYDROcodone-acetaminophen (NORCO)  MG per tablet prn     • losartan-hydrochlorothiazide (HYZAAR) 100-25 MG per tablet Take 1 tablet by mouth Daily. 30 tablet 4   • meloxicam (MOBIC) 15 MG tablet TAKE 1 TABLET EVERY DAY 30 tablet 6   • metFORMIN (GLUCOPHAGE) 500 MG tablet 2 (Two) Times a Day.     • metoprolol succinate XL (TOPROL-XL) 100 MG 24 hr tablet Take 1 tablet by mouth Daily. 90 tablet 3   • montelukast (SINGULAIR) 10 MG tablet Take 10 mg by mouth Every Night.     • nitroglycerin (NITROSTAT) 0.4 MG SL tablet As Needed.     • pantoprazole (PROTONIX) 40 MG EC tablet Daily.     • pravastatin (PRAVACHOL) 20 MG tablet Take 20 mg by mouth Every Night.     • senna (SENOKOT) 8.6 MG tablet tablet Take  by mouth Every Night.     • traZODone (DESYREL) 50 MG tablet Take 50 mg by mouth Every Night.     • isosorbide mononitrate (IMDUR) 30 MG 24 hr tablet Take 1 tablet by mouth Every Morning. 30 tablet 11   • varenicline (CHANTIX STARTING MONTH KARIE) 0.5 MG X 11 & 1 MG X 42 tablet Take 0.5 mg one daily on days 1-3 and and 0.5 mg twice daily on days 4-7.Then 1 mg twice daily for a total of 12 weeks. 1 tablet 0   • [DISCONTINUED] atorvastatin (LIPITOR) 40 MG tablet Take 40 mg by mouth Daily.     • [DISCONTINUED] sulfamethoxazole-trimethoprim (BACTRIM DS) 800-160 MG per tablet Take 1 tablet by mouth 2 (Two) Times a Day. 20 tablet 0     No facility-administered encounter medications on file as of 3/6/2019.        Patient has no known allergies.    Past Medical History:   Diagnosis Date   • Abnormal EKG 3/6/2019   • Anxiety    • Arthritis    •  Coronary artery disease    • Depression    • Diabetes mellitus (CMS/HCC)    • GERD (gastroesophageal reflux disease)    • Hiatal hernia    • Hyperlipidemia    • Hypertension    • Myocardial infarction (CMS/HCC)    • Non-functioning kidney     Left    • Peripheral vascular disease (CMS/HCC)    • Renal artery aneurysm (CMS/HCC)    • Systolic ejection murmur 3/27/2018       Social History     Socioeconomic History   • Marital status:      Spouse name: Not on file   • Number of children: 3   • Years of education: Not on file   • Highest education level: Not on file   Social Needs   • Financial resource strain: Not on file   • Food insecurity - worry: Not on file   • Food insecurity - inability: Not on file   • Transportation needs - medical: Not on file   • Transportation needs - non-medical: Not on file   Occupational History   • Occupation: [employer]     Comment: Disabled-PVD   Tobacco Use   • Smoking status: Current Every Day Smoker     Packs/day: 0.75     Years: 40.00     Pack years: 30.00     Types: Cigarettes, Electronic Cigarette     Last attempt to quit: 2018     Years since quittin.6   • Smokeless tobacco: Never Used   Substance and Sexual Activity   • Alcohol use: No   • Drug use: No   • Sexual activity: Defer   Other Topics Concern   • Not on file   Social History Narrative    Lives in [facility].        Family History   Adopted: Yes   Problem Relation Age of Onset   • No Known Problems Mother    • No Known Problems Father        Review of Systems   Constitutional: Positive for fatigue (More tired lately ) and fever (Slight fever yesterday ). Negative for chills.   HENT: Negative.  Negative for congestion, ear pain, rhinorrhea and sore throat.    Eyes: Positive for visual disturbance (Reading glasses ).   Respiratory: Positive for chest tightness (Occasional tightness in her chest ) and shortness of breath (Occasional ).    Cardiovascular: Positive for chest pain  "(Has burning pain in chest, \"like my chest is on fire\".  Some tingling into left arm. Took NTG and it resolved. ). Negative for palpitations (Races and flutters frequently ) and leg swelling.   Gastrointestinal: Negative.    Endocrine: Negative.  Negative for cold intolerance and heat intolerance.   Genitourinary: Negative.    Musculoskeletal: Positive for arthralgias (Joints ) and back pain (Lower back pain ).   Skin: Negative for rash and wound.   Allergic/Immunologic: Negative for environmental allergies and food allergies.   Neurological: Positive for weakness (Feels more weak in the last few weeks ) and light-headedness (Occasional lightheadedness. PCP has diagnosed her with fluid behind her eardrums). Negative for dizziness.   Hematological: Bruises/bleeds easily (Bruises and bleeds easily ).   Psychiatric/Behavioral: Positive for agitation (Easily agitated ) and sleep disturbance (Wakes up 2-3 times weekly with smothering ). Negative for confusion. The patient is nervous/anxious (Stays nervous a lot of the time ).    All other systems reviewed and are negative.      Objective   Vitals:    03/06/19 1337   BP: 134/66   BP Location: Left arm   Patient Position: Sitting   Pulse: 75   SpO2: 96%   Weight: 84.8 kg (187 lb)   Height: 162.6 cm (64\")      /66 (BP Location: Left arm, Patient Position: Sitting)   Pulse 75   Ht 162.6 cm (64\")   Wt 84.8 kg (187 lb)   SpO2 96%   BMI 32.10 kg/m²     Lab Results (most recent)     None          Physical Exam   Constitutional: She is oriented to person, place, and time. She appears well-developed and well-nourished. No distress.   HENT:   Head: Normocephalic and atraumatic.   Eyes: EOM are normal. Pupils are equal, round, and reactive to light.   Neck: No JVD present. Carotid bruit is present.   Cardiovascular: Normal rate, regular rhythm, normal heart sounds and intact distal pulses. Exam reveals no gallop and no friction rub.   No murmur heard.  Pulmonary/Chest: " Effort normal and breath sounds normal. No respiratory distress. She has no wheezes. She has no rales. She exhibits no tenderness.   Musculoskeletal: Normal range of motion. She exhibits no edema.   Neurological: She is alert and oriented to person, place, and time. No cranial nerve deficit.   Skin: Skin is warm and dry. No rash noted. No erythema. No pallor.   Psychiatric: She has a normal mood and affect. Her behavior is normal.   Nursing note and vitals reviewed.      Procedure   Procedures       Assessment/Plan     Problems Addressed this Visit        Cardiovascular and Mediastinum    Coronary artery disease involving native coronary artery of native heart without angina pectoris    Relevant Medications    isosorbide mononitrate (IMDUR) 30 MG 24 hr tablet    Other Relevant Orders    Adult Transthoracic Echo Complete W/ Cont if Necessary Per Protocol    Stress Test With Myocardial Perfusion One Day    Bilateral carotid bruits    Relevant Orders    Adult Transthoracic Echo Complete W/ Cont if Necessary Per Protocol    Stress Test With Myocardial Perfusion One Day    Duplex Carotid Ultrasound CAR    Abnormal EKG    Relevant Orders    Adult Transthoracic Echo Complete W/ Cont if Necessary Per Protocol    Stress Test With Myocardial Perfusion One Day       Respiratory    Shortness of breath - Primary    Relevant Orders    Adult Transthoracic Echo Complete W/ Cont if Necessary Per Protocol    Stress Test With Myocardial Perfusion One Day       Nervous and Auditory    Chest pain    Relevant Orders    Adult Transthoracic Echo Complete W/ Cont if Necessary Per Protocol    Stress Test With Myocardial Perfusion One Day          Recommendation  1.  I am concerned because of patient's chest discomfort.  With history of stenting and extensive peripheral vascular disease with continued tobacco use, I am concerned that the patient has extensive vasculopathy.  She has loud carotid bruits on examination and I am going to try to  schedule carotid duplex and will likely schedule carotid CTA as I am very suspicious she has high-grade disease.  2.  I would like to expedite stress testing for risk stratification and I am prescribing nitrates.  If her chest pain does not improve pending results of stress test, we will likely consider catheterization.  3.  Echocardiogram to evaluate LV structure function, assess diastolic performance and rule out valvular disease.  4.  I would like to see her back in 2 weeks.  I would like for her to have testing done next week and for any high risk results we will see her next week.  5.  We will see her back for follow-up after testing.  Any chest pain, not resolved with nitroglycerin, she is to go to the ER.  She will follow a primary schedule         I advised Lyndsay of the risks of continuing to use tobacco, and I provided her with tobacco cessation educational materials in the After Visit Summary.     During this visit, I spent >3 minutes counseling the patient regarding tobacco cessation.     Patient's Body mass index is 32.1 kg/m². BMI is above normal parameters. Recommendations include: educational material.       Electronically signed by:

## 2019-03-06 NOTE — PATIENT INSTRUCTIONS
Steps to Quit Smoking  Smoking tobacco can be bad for your health. It can also affect almost every organ in your body. Smoking puts you and people around you at risk for many serious long-lasting (chronic) diseases. Quitting smoking is hard, but it is one of the best things that you can do for your health. It is never too late to quit.  What are the benefits of quitting smoking?  When you quit smoking, you lower your risk for getting serious diseases and conditions. They can include:  · Lung cancer or lung disease.  · Heart disease.  · Stroke.  · Heart attack.  · Not being able to have children (infertility).  · Weak bones (osteoporosis) and broken bones (fractures).    If you have coughing, wheezing, and shortness of breath, those symptoms may get better when you quit. You may also get sick less often. If you are pregnant, quitting smoking can help to lower your chances of having a baby of low birth weight.  What can I do to help me quit smoking?  Talk with your doctor about what can help you quit smoking. Some things you can do (strategies) include:  · Quitting smoking totally, instead of slowly cutting back how much you smoke over a period of time.  · Going to in-person counseling. You are more likely to quit if you go to many counseling sessions.  · Using resources and support systems, such as:  ? Online chats with a counselor.  ? Phone quitlines.  ? Printed self-help materials.  ? Support groups or group counseling.  ? Text messaging programs.  ? Mobile phone apps or applications.  · Taking medicines. Some of these medicines may have nicotine in them. If you are pregnant or breastfeeding, do not take any medicines to quit smoking unless your doctor says it is okay. Talk with your doctor about counseling or other things that can help you.    Talk with your doctor about using more than one strategy at the same time, such as taking medicines while you are also going to in-person counseling. This can help make  quitting easier.  What things can I do to make it easier to quit?  Quitting smoking might feel very hard at first, but there is a lot that you can do to make it easier. Take these steps:  · Talk to your family and friends. Ask them to support and encourage you.  · Call phone quitlines, reach out to support groups, or work with a counselor.  · Ask people who smoke to not smoke around you.  · Avoid places that make you want (trigger) to smoke, such as:  ? Bars.  ? Parties.  ? Smoke-break areas at work.  · Spend time with people who do not smoke.  · Lower the stress in your life. Stress can make you want to smoke. Try these things to help your stress:  ? Getting regular exercise.  ? Deep-breathing exercises.  ? Yoga.  ? Meditating.  ? Doing a body scan. To do this, close your eyes, focus on one area of your body at a time from head to toe, and notice which parts of your body are tense. Try to relax the muscles in those areas.  · Download or buy apps on your mobile phone or tablet that can help you stick to your quit plan. There are many free apps, such as QuitGuide from the CDC (Centers for Disease Control and Prevention). You can find more support from smokefree.gov and other websites.    This information is not intended to replace advice given to you by your health care provider. Make sure you discuss any questions you have with your health care provider.  Document Released: 10/14/2010 Document Revised: 08/15/2017 Document Reviewed: 05/03/2016  [employer] Interactive Patient Education © 2018 [employer]  Fat and Cholesterol Restricted Diet  Getting too much fat and cholesterol in your diet may cause health problems. Following this diet helps keep your fat and cholesterol at normal levels. This can keep you from getting sick.  What types of fat should I choose?  · Choose monosaturated and polyunsaturated fats. These are found in foods such as olive oil, canola oil, flaxseeds, walnuts, almonds, and seeds.  · Eat more  "omega-3 fats. Good choices include salmon, mackerel, sardines, tuna, flaxseed oil, and ground flaxseeds.  · Limit saturated fats. These are in animal products such as meats, butter, and cream. They can also be in plant products such as palm oil, palm kernel oil, and coconut oil.  · Avoid foods with partially hydrogenated oils in them. These contain trans fats. Examples of foods that have trans fats are stick margarine, some tub margarines, cookies, crackers, and other baked goods.  What general guidelines do I need to follow?  · Check food labels. Look for the words \"trans fat\" and \"saturated fat.\"  · When preparing a meal:  ? Fill half of your plate with vegetables and green salads.  ? Fill one fourth of your plate with whole grains. Look for the word \"whole\" as the first word in the ingredient list.  ? Fill one fourth of your plate with lean protein foods.  · Eat more foods that have fiber, like apples, carrots, beans, peas, and barley.  · Eat more home-cooked foods. Eat less at restaurants and buffets.  · Limit or avoid alcohol.  · Limit foods high in starch and sugar.  · Limit fried foods.  · Cook foods without frying them. Baking, boiling, grilling, and broiling are all great options.  · Lose weight if you are overweight. Losing even a small amount of weight can help your overall health. It can also help prevent diseases such as diabetes and heart disease.  What foods can I eat?  Grains  Whole grains, such as whole wheat or whole grain breads, crackers, cereals, and pasta. Unsweetened oatmeal, bulgur, barley, quinoa, or brown rice. Corn or whole wheat flour tortillas.  Vegetables  Fresh or frozen vegetables (raw, steamed, roasted, or grilled). Green salads.  Fruits  All fresh, canned (in natural juice), or frozen fruits.  Meat and Other Protein Products  Ground beef (85% or leaner), grass-fed beef, or beef trimmed of fat. Skinless chicken or turkey. Ground chicken or turkey. Pork trimmed of fat. All fish and " seafood. Eggs. Dried beans, peas, or lentils. Unsalted nuts or seeds. Unsalted canned or dry beans.  Dairy  Low-fat dairy products, such as skim or 1% milk, 2% or reduced-fat cheeses, low-fat ricotta or cottage cheese, or plain low-fat yogurt.  Fats and Oils  Tub margarines without trans fats. Light or reduced-fat mayonnaise and salad dressings. Avocado. Olive, canola, sesame, or safflower oils. Natural peanut or almond butter (choose ones without added sugar and oil).  The items listed above may not be a complete list of recommended foods or beverages. Contact your dietitian for more options.  What foods are not recommended?  Grains  White bread. White pasta. White rice. Cornbread. Bagels, pastries, and croissants. Crackers that contain trans fat.  Vegetables  White potatoes. Corn. Creamed or fried vegetables. Vegetables in a cheese sauce.  Fruits  Dried fruits. Canned fruit in light or heavy syrup. Fruit juice.  Meat and Other Protein Products  Fatty cuts of meat. Ribs, chicken wings, smith, sausage, bologna, salami, chitterlings, fatback, hot dogs, bratwurst, and packaged luncheon meats. Liver and organ meats.  Dairy  Whole or 2% milk, cream, half-and-half, and cream cheese. Whole milk cheeses. Whole-fat or sweetened yogurt. Full-fat cheeses. Nondairy creamers and whipped toppings. Processed cheese, cheese spreads, or cheese curds.  Sweets and Desserts  Corn syrup, sugars, honey, and molasses. Candy. Jam and jelly. Syrup. Sweetened cereals. Cookies, pies, cakes, donuts, muffins, and ice cream.  Fats and Oils  Butter, stick margarine, lard, shortening, ghee, or smith fat. Coconut, palm kernel, or palm oils.  Beverages  Alcohol. Sweetened drinks (such as sodas, lemonade, and fruit drinks or punches).  The items listed above may not be a complete list of foods and beverages to avoid. Contact your dietitian for more information.  This information is not intended to replace advice given to you by your health care  provider. Make sure you discuss any questions you have with your health care provider.  Document Released: 06/18/2013 Document Revised: 08/24/2017 Document Reviewed: 03/19/2015  The Society Interactive Patient Education © 2018 [employer].  BMI for Adults  Body mass index (BMI) is a number that is calculated from a person's weight and height. In most adults, the number is used to find how much of an adult's weight is made up of fat. BMI is not as accurate as a direct measure of body fat.  How is BMI calculated?  BMI is calculated by dividing weight in kilograms by height in meters squared. It can also be calculated by dividing weight in pounds by height in inches squared, then multiplying the resulting number by 703. Charts are available to help you find your BMI quickly and easily without doing this calculation.  How is BMI interpreted?  Health care professionals use BMI charts to identify whether an adult is underweight, at a normal weight, or overweight based on the following guidelines:  · Underweight: BMI less than 18.5.  · Normal weight: BMI between 18.5 and 24.9.  · Overweight: BMI between 25 and 29.9.  · Obese: BMI of 30 and above.    BMI is usually interpreted the same for males and females.  Weight includes both fat and muscle, so someone with a muscular build, such as an athlete, may have a BMI that is higher than 24.9. In cases like these, BMI may not accurately depict body fat. To determine if excess body fat is the cause of a BMI of 25 or higher, further assessments may need to be done by a health care provider.  Why is BMI a useful tool?  BMI is used to identify a possible weight problem that may be related to a medical problem or may increase the risk for medical problems. BMI can also be used to promote changes to reach a healthy weight.  This information is not intended to replace advice given to you by your health care provider. Make sure you discuss any questions you have with your health care  provider.  Document Released: 08/29/2005 Document Revised: 04/27/2017 Document Reviewed: 05/15/2015  [employer] mobile Interactive Patient Education © 2018 [employer]

## 2019-03-12 ENCOUNTER — HOSPITAL ENCOUNTER (OUTPATIENT)
Dept: CARDIOLOGY | Facility: HOSPITAL | Age: 69
Discharge: HOME | End: 2019-03-12

## 2019-03-12 DIAGNOSIS — R09.89 BILATERAL CAROTID BRUITS: ICD-10-CM

## 2019-03-12 DIAGNOSIS — R94.31 ABNORMAL EKG: ICD-10-CM

## 2019-03-12 DIAGNOSIS — R07.9 CHEST PAIN, UNSPECIFIED TYPE: ICD-10-CM

## 2019-03-12 DIAGNOSIS — I25.10 CORONARY ARTERY DISEASE INVOLVING NATIVE CORONARY ARTERY OF NATIVE HEART WITHOUT ANGINA PECTORIS: ICD-10-CM

## 2019-03-12 DIAGNOSIS — R06.02 SHORTNESS OF BREATH: ICD-10-CM

## 2019-03-12 PROCEDURE — A9500 TC99M SESTAMIBI: HCPCS | Performed by: INTERNAL MEDICINE

## 2019-03-12 PROCEDURE — 93880 EXTRACRANIAL BILAT STUDY: CPT

## 2019-03-12 PROCEDURE — 93018 CV STRESS TEST I&R ONLY: CPT | Performed by: INTERNAL MEDICINE

## 2019-03-12 PROCEDURE — 78452 HT MUSCLE IMAGE SPECT MULT: CPT

## 2019-03-12 PROCEDURE — 93306 TTE W/DOPPLER COMPLETE: CPT

## 2019-03-12 PROCEDURE — 0 TECHNETIUM SESTAMIBI: Performed by: INTERNAL MEDICINE

## 2019-03-12 PROCEDURE — 78452 HT MUSCLE IMAGE SPECT MULT: CPT | Performed by: INTERNAL MEDICINE

## 2019-03-12 PROCEDURE — 25010000002 REGADENOSON 0.4 MG/5ML SOLUTION: Performed by: INTERNAL MEDICINE

## 2019-03-12 PROCEDURE — 93880 EXTRACRANIAL BILAT STUDY: CPT | Performed by: INTERNAL MEDICINE

## 2019-03-12 PROCEDURE — 93306 TTE W/DOPPLER COMPLETE: CPT | Performed by: INTERNAL MEDICINE

## 2019-03-12 PROCEDURE — 93017 CV STRESS TEST TRACING ONLY: CPT

## 2019-03-12 RX ADMIN — REGADENOSON 0.4 MG: 0.08 INJECTION, SOLUTION INTRAVENOUS at 11:37

## 2019-03-12 RX ADMIN — TECHNETIUM TC 99M SESTAMIBI 1 DOSE: 1 INJECTION INTRAVENOUS at 11:37

## 2019-03-12 RX ADMIN — TECHNETIUM TC 99M SESTAMIBI 1 DOSE: 1 INJECTION INTRAVENOUS at 11:29

## 2019-03-14 ENCOUNTER — TELEPHONE (OUTPATIENT)
Dept: CARDIOLOGY | Facility: CLINIC | Age: 69
End: 2019-03-14

## 2019-03-14 NOTE — TELEPHONE ENCOUNTER
"Per chart review, pt had echo and stress done on 3/12/19, she has a follow up for results on 3/20/19.   Doesn't look like we have received any records from the hospital.       Pt states that last night she took two nitros and an aspirin, no relief, called an ambulance and was taken to [facility] ER. States her BP was elevated, states they tried to get an IV in and \"kept blowing my veins.\" Pt states that they didn't change any medications, states they did an x-ray.   I informed pt that we haven't received anything from them, but that we can obtain records.   She denies any CP or pressure currently, states she still has tingling in her left arm.   Pt asked about her stress and echo, I informed her that we don't have results yet, but hopefully will by her follow up appt.   Advised pt to go back to ER for further issues and to keep her follow up appt barrett/ Fuentes, she verbalized understanding.   "

## 2019-03-14 NOTE — TELEPHONE ENCOUNTER
----- Message from Charlenekatie Wellington sent at 3/14/2019 11:27 AM EDT -----  Contact: PT  PT HAD TO TAKE TWO NITRO AND CALLED AN AMBULANCE. SHE IS HOME NOW AND IS WANTING TO TALK TO A NURSE ABOUT SOME TESTS AND BLOODWORK SHE HAD DONE. PLEASE CALL HER [phone]

## 2019-03-14 NOTE — TELEPHONE ENCOUNTER
RC pt she states she has a f/u with PCP tomorrow for HFU.  Pt requested labs and hospital record for OV 3/20-19.  Pt said she was not sure what they did last night, she was d/c 6 am.  She does not have a BP monitor at home to keep a log.  She stated she was very tired but no other c/o at this time.  RENATE,ADELAIDA

## 2019-03-17 LAB
BH CV NUCLEAR PRIOR STUDY: 3
BH CV STRESS BP STAGE 1: NORMAL
BH CV STRESS COMMENTS STAGE 1: NORMAL
BH CV STRESS DOSE REGADENOSON STAGE 1: 0.4
BH CV STRESS DURATION MIN STAGE 1: 0
BH CV STRESS DURATION SEC STAGE 1: 10
BH CV STRESS HR STAGE 1: 75
BH CV STRESS PROTOCOL 1: NORMAL
BH CV STRESS RECOVERY BP: NORMAL MMHG
BH CV STRESS RECOVERY HR: 80 BPM
BH CV STRESS STAGE 1: 1
MAXIMAL PREDICTED HEART RATE: 154 BPM
PERCENT MAX PREDICTED HR: 60.39 %
STRESS BASELINE BP: NORMAL MMHG
STRESS BASELINE HR: 69 BPM
STRESS PERCENT HR: 71 %
STRESS POST PEAK BP: NORMAL MMHG
STRESS POST PEAK HR: 93 BPM
STRESS TARGET HR: 131 BPM

## 2019-03-18 ENCOUNTER — TELEPHONE (OUTPATIENT)
Dept: CARDIOLOGY | Facility: CLINIC | Age: 69
End: 2019-03-18

## 2019-03-18 LAB
BH CV ECHO MEAS - ACS: 2 CM
BH CV ECHO MEAS - AO MEAN PG: 3.3 MMHG
BH CV ECHO MEAS - AO ROOT AREA (BSA CORRECTED): 1.8
BH CV ECHO MEAS - AO ROOT AREA: 8.9 CM^2
BH CV ECHO MEAS - AO ROOT DIAM: 3.4 CM
BH CV ECHO MEAS - AO V2 MEAN: 85.2 CM/SEC
BH CV ECHO MEAS - AO V2 VTI: 21.2 CM
BH CV ECHO MEAS - BSA(HAYCOCK): 2 M^2
BH CV ECHO MEAS - BSA(HAYCOCK): 2 M^2
BH CV ECHO MEAS - BSA: 1.9 M^2
BH CV ECHO MEAS - BSA: 1.9 M^2
BH CV ECHO MEAS - BZI_BMI: 32.1 KILOGRAMS/M^2
BH CV ECHO MEAS - BZI_BMI: 32.1 KILOGRAMS/M^2
BH CV ECHO MEAS - BZI_METRIC_HEIGHT: 162.6 CM
BH CV ECHO MEAS - BZI_METRIC_HEIGHT: 162.6 CM
BH CV ECHO MEAS - BZI_METRIC_WEIGHT: 84.8 KG
BH CV ECHO MEAS - BZI_METRIC_WEIGHT: 84.8 KG
BH CV ECHO MEAS - EDV(CUBED): 87.3 ML
BH CV ECHO MEAS - EDV(MOD-SP4): 51 ML
BH CV ECHO MEAS - EDV(TEICH): 89.4 ML
BH CV ECHO MEAS - EF(CUBED): 55.9 %
BH CV ECHO MEAS - EF(MOD-SP4): 31.4 %
BH CV ECHO MEAS - EF(TEICH): 47.8 %
BH CV ECHO MEAS - ESV(CUBED): 38.5 ML
BH CV ECHO MEAS - ESV(MOD-SP4): 35 ML
BH CV ECHO MEAS - ESV(TEICH): 46.7 ML
BH CV ECHO MEAS - FS: 23.9 %
BH CV ECHO MEAS - IVS/LVPW: 1.1
BH CV ECHO MEAS - IVSD: 1.5 CM
BH CV ECHO MEAS - LA DIMENSION: 3.6 CM
BH CV ECHO MEAS - LA/AO: 1.1
BH CV ECHO MEAS - LV DIASTOLIC VOL/BSA (35-75): 26.8 ML/M^2
BH CV ECHO MEAS - LV IVRT: 0.14 SEC
BH CV ECHO MEAS - LV MASS(C)D: 249.9 GRAMS
BH CV ECHO MEAS - LV MASS(C)DI: 131.4 GRAMS/M^2
BH CV ECHO MEAS - LV SYSTOLIC VOL/BSA (12-30): 18.4 ML/M^2
BH CV ECHO MEAS - LVIDD: 4.4 CM
BH CV ECHO MEAS - LVIDS: 3.4 CM
BH CV ECHO MEAS - LVLD AP4: 7 CM
BH CV ECHO MEAS - LVLS AP4: 5.8 CM
BH CV ECHO MEAS - LVOT AREA (M): 4.2 CM^2
BH CV ECHO MEAS - LVOT AREA: 4.3 CM^2
BH CV ECHO MEAS - LVOT DIAM: 2.3 CM
BH CV ECHO MEAS - LVPWD: 1.4 CM
BH CV ECHO MEAS - MV A MAX VEL: 88.4 CM/SEC
BH CV ECHO MEAS - MV DEC SLOPE: 117.4 CM/SEC^2
BH CV ECHO MEAS - MV E MAX VEL: 37.5 CM/SEC
BH CV ECHO MEAS - MV E/A: 0.42
BH CV ECHO MEAS - RVDD: 3 CM
BH CV ECHO MEAS - SI(AO): 99.1 ML/M^2
BH CV ECHO MEAS - SI(CUBED): 25.6 ML/M^2
BH CV ECHO MEAS - SI(MOD-SP4): 8.4 ML/M^2
BH CV ECHO MEAS - SI(TEICH): 22.5 ML/M^2
BH CV ECHO MEAS - SV(AO): 188.4 ML
BH CV ECHO MEAS - SV(CUBED): 48.7 ML
BH CV ECHO MEAS - SV(MOD-SP4): 16 ML
BH CV ECHO MEAS - SV(TEICH): 42.7 ML
BH CV XLRA MEAS LEFT BULB EDV: -1.5 CM/SEC
BH CV XLRA MEAS LEFT BULB PSV: -71.8 CM/SEC
BH CV XLRA MEAS LEFT CCA RATIO VEL: -76.4 CM/SEC
BH CV XLRA MEAS LEFT DIST CCA EDV: -19.1 CM/SEC
BH CV XLRA MEAS LEFT DIST CCA PSV: -76.4 CM/SEC
BH CV XLRA MEAS LEFT DIST ICA EDV: -25.8 CM/SEC
BH CV XLRA MEAS LEFT DIST ICA PSV: -121 CM/SEC
BH CV XLRA MEAS LEFT ICA RATIO VEL: -170 CM/SEC
BH CV XLRA MEAS LEFT ICA/CCA RATIO: 2.2
BH CV XLRA MEAS LEFT MID CCA EDV: 19.1 CM/SEC
BH CV XLRA MEAS LEFT MID CCA PSV: 91.7 CM/SEC
BH CV XLRA MEAS LEFT MID ICA EDV: -25.8 CM/SEC
BH CV XLRA MEAS LEFT MID ICA PSV: -139 CM/SEC
BH CV XLRA MEAS LEFT PROX CCA EDV: 18.3 CM/SEC
BH CV XLRA MEAS LEFT PROX CCA PSV: 93.9 CM/SEC
BH CV XLRA MEAS LEFT PROX ECA EDV: -9.2 CM/SEC
BH CV XLRA MEAS LEFT PROX ECA PSV: -95.5 CM/SEC
BH CV XLRA MEAS LEFT PROX ICA EDV: -41.9 CM/SEC
BH CV XLRA MEAS LEFT PROX ICA PSV: -170 CM/SEC
BH CV XLRA MEAS LEFT VERTEBRAL A EDV: 9.7 CM/SEC
BH CV XLRA MEAS LEFT VERTEBRAL A PSV: 42.2 CM/SEC
BH CV XLRA MEAS RIGHT BULB EDV: 20.6 CM/SEC
BH CV XLRA MEAS RIGHT BULB PSV: 87.8 CM/SEC
BH CV XLRA MEAS RIGHT CCA RATIO VEL: 86.3 CM/SEC
BH CV XLRA MEAS RIGHT DIST CCA EDV: 16.8 CM/SEC
BH CV XLRA MEAS RIGHT DIST CCA PSV: 86.3 CM/SEC
BH CV XLRA MEAS RIGHT DIST ICA EDV: -6.9 CM/SEC
BH CV XLRA MEAS RIGHT DIST ICA PSV: -69.5 CM/SEC
BH CV XLRA MEAS RIGHT ICA RATIO VEL: -104 CM/SEC
BH CV XLRA MEAS RIGHT ICA/CCA RATIO: -1.2
BH CV XLRA MEAS RIGHT MID CCA EDV: 16 CM/SEC
BH CV XLRA MEAS RIGHT MID CCA PSV: 91.7 CM/SEC
BH CV XLRA MEAS RIGHT MID ICA EDV: -26 CM/SEC
BH CV XLRA MEAS RIGHT MID ICA PSV: -104 CM/SEC
BH CV XLRA MEAS RIGHT PROX CCA EDV: 14.5 CM/SEC
BH CV XLRA MEAS RIGHT PROX CCA PSV: 90.1 CM/SEC
BH CV XLRA MEAS RIGHT PROX ECA EDV: -10.7 CM/SEC
BH CV XLRA MEAS RIGHT PROX ECA PSV: -116 CM/SEC
BH CV XLRA MEAS RIGHT PROX ICA EDV: -31.3 CM/SEC
BH CV XLRA MEAS RIGHT PROX ICA PSV: -99.3 CM/SEC
BH CV XLRA MEAS RIGHT VERTEBRAL A EDV: 18.3 CM/SEC
BH CV XLRA MEAS RIGHT VERTEBRAL A PSV: 61.9 CM/SEC
MAXIMAL PREDICTED HEART RATE: 154 BPM
STRESS TARGET HR: 131 BPM

## 2019-03-18 NOTE — TELEPHONE ENCOUNTER
Patient aware of stress test results. Patient will keep follow up scheduled Wednesday 3/20/19. Courtney Hicks MA

## 2019-03-20 ENCOUNTER — LAB (OUTPATIENT)
Dept: LAB | Facility: HOSPITAL | Age: 69
End: 2019-03-20

## 2019-03-20 ENCOUNTER — OFFICE VISIT (OUTPATIENT)
Dept: CARDIOLOGY | Facility: CLINIC | Age: 69
End: 2019-03-20

## 2019-03-20 VITALS
HEART RATE: 69 BPM | SYSTOLIC BLOOD PRESSURE: 121 MMHG | BODY MASS INDEX: 33.6 KG/M2 | WEIGHT: 196.8 LBS | HEIGHT: 64 IN | OXYGEN SATURATION: 97 % | DIASTOLIC BLOOD PRESSURE: 72 MMHG

## 2019-03-20 DIAGNOSIS — I25.10 CORONARY ARTERY DISEASE INVOLVING NATIVE CORONARY ARTERY OF NATIVE HEART WITHOUT ANGINA PECTORIS: ICD-10-CM

## 2019-03-20 DIAGNOSIS — R07.9 CHEST PAIN, UNSPECIFIED TYPE: ICD-10-CM

## 2019-03-20 DIAGNOSIS — R06.02 SHORTNESS OF BREATH: ICD-10-CM

## 2019-03-20 DIAGNOSIS — I73.9 PVD (PERIPHERAL VASCULAR DISEASE) WITH CLAUDICATION (HCC): ICD-10-CM

## 2019-03-20 DIAGNOSIS — I73.9 PVD (PERIPHERAL VASCULAR DISEASE) (HCC): ICD-10-CM

## 2019-03-20 DIAGNOSIS — R06.02 SHORTNESS OF BREATH: Primary | ICD-10-CM

## 2019-03-20 DIAGNOSIS — M79.601 ARM PAIN, ANTERIOR, RIGHT: ICD-10-CM

## 2019-03-20 PROCEDURE — 36415 COLL VENOUS BLD VENIPUNCTURE: CPT

## 2019-03-20 PROCEDURE — 99214 OFFICE O/P EST MOD 30 MIN: CPT | Performed by: PHYSICIAN ASSISTANT

## 2019-03-20 PROCEDURE — 85007 BL SMEAR W/DIFF WBC COUNT: CPT | Performed by: PHYSICIAN ASSISTANT

## 2019-03-20 PROCEDURE — 80053 COMPREHEN METABOLIC PANEL: CPT | Performed by: PHYSICIAN ASSISTANT

## 2019-03-20 PROCEDURE — 85025 COMPLETE CBC W/AUTO DIFF WBC: CPT | Performed by: PHYSICIAN ASSISTANT

## 2019-03-20 RX ORDER — BUSPIRONE HYDROCHLORIDE 10 MG/1
10 TABLET ORAL 3 TIMES DAILY
Qty: 30 TABLET | Refills: 1 | Status: SHIPPED | OUTPATIENT
Start: 2019-03-20 | End: 2019-05-22 | Stop reason: SDUPTHER

## 2019-03-20 NOTE — PATIENT INSTRUCTIONS
For more information:    Quit Now [place]  1-800-QUIT-NOW  https://[place].quitlogix.org/en-[place]/  Steps to Quit Smoking  Smoking tobacco can be harmful to your health and can affect almost every organ in your body. Smoking puts you, and those around you, at risk for developing many serious chronic diseases. Quitting smoking is difficult, but it is one of the best things that you can do for your health. It is never too late to quit.  What are the benefits of quitting smoking?  When you quit smoking, you lower your risk of developing serious diseases and conditions, such as:  · Lung cancer or lung disease, such as COPD.  · Heart disease.  · Stroke.  · Heart attack.  · Infertility.  · Osteoporosis and bone fractures.  Additionally, symptoms such as coughing, wheezing, and shortness of breath may get better when you quit. You may also find that you get sick less often because your body is stronger at fighting off colds and infections. If you are pregnant, quitting smoking can help to reduce your chances of having a baby of low birth weight.  How do I get ready to quit?  When you decide to quit smoking, create a plan to make sure that you are successful. Before you quit:  · Pick a date to quit. Set a date within the next two weeks to give you time to prepare.  · Write down the reasons why you are quitting. Keep this list in places where you will see it often, such as on your bathroom mirror or in your car or wallet.  · Identify the people, places, things, and activities that make you want to smoke (triggers) and avoid them. Make sure to take these actions:  ¨ Throw away all cigarettes at home, at work, and in your car.  ¨ Throw away smoking accessories, such as ashtrays and lighters.  ¨ Clean your car and make sure to empty the ashtray.  ¨ Clean your home, including curtains and carpets.  · Tell your family, friends, and coworkers that you are quitting. Support from your loved ones can make quitting easier.  · Talk with  your health care provider about your options for quitting smoking.  · Find out what treatment options are covered by your health insurance.  What strategies can I use to quit smoking?  Talk with your healthcare provider about different strategies to quit smoking. Some strategies include:  · Quitting smoking altogether instead of gradually lessening how much you smoke over a period of time. Research shows that quitting “cold turkey” is more successful than gradually quitting.  · Attending in-person counseling to help you build problem-solving skills. You are more likely to have success in quitting if you attend several counseling sessions. Even short sessions of 10 minutes can be effective.  · Finding resources and support systems that can help you to quit smoking and remain smoke-free after you quit. These resources are most helpful when you use them often. They can include:  ¨ Online chats with a counselor.  ¨ Telephone quitlines.  ¨ Printed self-help materials.  ¨ Support groups or group counseling.  ¨ Text messaging programs.  ¨ Mobile phone applications.  · Taking medicines to help you quit smoking. (If you are pregnant or breastfeeding, talk with your health care provider first.) Some medicines contain nicotine and some do not. Both types of medicines help with cravings, but the medicines that include nicotine help to relieve withdrawal symptoms. Your health care provider may recommend:  ¨ Nicotine patches, gum, or lozenges.  ¨ Nicotine inhalers or sprays.  ¨ Non-nicotine medicine that is taken by mouth.  Talk with your health care provider about combining strategies, such as taking medicines while you are also receiving in-person counseling. Using these two strategies together makes you more likely to succeed in quitting than if you used either strategy on its own.  If you are pregnant or breastfeeding, talk with your health care provider about finding counseling or other support strategies to quit smoking. Do  not take medicine to help you quit smoking unless told to do so by your health care provider.  What things can I do to make it easier to quit?  Quitting smoking might feel overwhelming at first, but there is a lot that you can do to make it easier. Take these important actions:  · Reach out to your family and friends and ask that they support and encourage you during this time. Call telephone quitlines, reach out to support groups, or work with a counselor for support.  · Ask people who smoke to avoid smoking around you.  · Avoid places that trigger you to smoke, such as bars, parties, or smoke-break areas at work.  · Spend time around people who do not smoke.  · Lessen stress in your life, because stress can be a smoking trigger for some people. To lessen stress, try:  ¨ Exercising regularly.  ¨ Deep-breathing exercises.  ¨ Yoga.  ¨ Meditating.  ¨ Performing a body scan. This involves closing your eyes, scanning your body from head to toe, and noticing which parts of your body are particularly tense. Purposefully relax the muscles in those areas.  · Download or purchase mobile phone or tablet apps (applications) that can help you stick to your quit plan by providing reminders, tips, and encouragement. There are many free apps, such as QuitGuide from the CDC (Centers for Disease Control and Prevention). You can find other support for quitting smoking (smoking cessation) through smokefree.gov and other websites.  How will I feel when I quit smoking?  Within the first 24 hours of quitting smoking, you may start to feel some withdrawal symptoms. These symptoms are usually most noticeable 2-3 days after quitting, but they usually do not last beyond 2-3 weeks. Changes or symptoms that you might experience include:  · Mood swings.  · Restlessness, anxiety, or irritation.  · Difficulty concentrating.  · Dizziness.  · Strong cravings for sugary foods in addition to nicotine.  · Mild weight  gain.  · Constipation.  · Nausea.  · Coughing or a sore throat.  · Changes in how your medicines work in your body.  · A depressed mood.  · Difficulty sleeping (insomnia).  After the first 2-3 weeks of quitting, you may start to notice more positive results, such as:  · Improved sense of smell and taste.  · Decreased coughing and sore throat.  · Slower heart rate.  · Lower blood pressure.  · Clearer skin.  · The ability to breathe more easily.  · Fewer sick days.  Quitting smoking is very challenging for most people. Do not get discouraged if you are not successful the first time. Some people need to make many attempts to quit before they achieve long-term success. Do your best to stick to your quit plan, and talk with your health care provider if you have any questions or concerns.  This information is not intended to replace advice given to you by your health care provider. Make sure you discuss any questions you have with your health care provider.  Document Released: 12/12/2002 Document Revised: 08/15/2017 Document Reviewed: 05/03/2016  [employer] Interactive Patient Education © 2017 [employer]  Heart-Healthy Eating Plan  Many factors influence your heart health, including eating and exercise habits. Heart (coronary) risk increases with abnormal blood fat (lipid) levels. Heart-healthy meal planning includes limiting unhealthy fats, increasing healthy fats, and making other small dietary changes. This includes maintaining a healthy body weight to help keep lipid levels within a normal range.  What is my plan?  Your health care provider recommends that you:  · Get no more than _________% of the total calories in your daily diet from fat.  · Limit your intake of saturated fat to less than _________% of your total calories each day.  · Limit the amount of cholesterol in your diet to less than _________ mg per day.    What types of fat should I choose?  · Choose healthy fats more often. Choose monounsaturated and  "polyunsaturated fats, such as olive oil and canola oil, flaxseeds, walnuts, almonds, and seeds.  · Eat more omega-3 fats. Good choices include salmon, mackerel, sardines, tuna, flaxseed oil, and ground flaxseeds. Aim to eat fish at least two times each week.  · Limit saturated fats. Saturated fats are primarily found in animal products, such as meats, butter, and cream. Plant sources of saturated fats include palm oil, palm kernel oil, and coconut oil.  · Avoid foods with partially hydrogenated oils in them. These contain trans fats. Examples of foods that contain trans fats are stick margarine, some tub margarines, cookies, crackers, and other baked goods.  What general guidelines do I need to follow?  · Check food labels carefully to identify foods with trans fats or high amounts of saturated fat.  · Fill one half of your plate with vegetables and green salads. Eat 4-5 servings of vegetables per day. A serving of vegetables equals 1 cup of raw leafy vegetables, ½ cup of raw or cooked cut-up vegetables, or ½ cup of vegetable juice.  · Fill one fourth of your plate with whole grains. Look for the word \"whole\" as the first word in the ingredient list.  · Fill one fourth of your plate with lean protein foods.  · Eat 4-5 servings of fruit per day. A serving of fruit equals one medium whole fruit, ¼ cup of dried fruit, ½ cup of fresh, frozen, or canned fruit, or ½ cup of 100% fruit juice.  · Eat more foods that contain soluble fiber. Examples of foods that contain this type of fiber are apples, broccoli, carrots, beans, peas, and barley. Aim to get 20-30 g of fiber per day.  · Eat more home-cooked food and less restaurant, buffet, and fast food.  · Limit or avoid alcohol.  · Limit foods that are high in starch and sugar.  · Avoid fried foods.  · Cook foods by using methods other than frying. Baking, boiling, grilling, and broiling are all great options. Other fat-reducing suggestions include:  ? Removing the skin from " poultry.  ? Removing all visible fats from meats.  ? Skimming the fat off of stews, soups, and gravies before serving them.  ? Steaming vegetables in water or broth.  · Lose weight if you are overweight. Losing just 5-10% of your initial body weight can help your overall health and prevent diseases such as diabetes and heart disease.  · Increase your consumption of nuts, legumes, and seeds to 4-5 servings per week. One serving of dried beans or legumes equals ½ cup after being cooked, one serving of nuts equals 1½ ounces, and one serving of seeds equals ½ ounce or 1 tablespoon.  · You may need to monitor your salt (sodium) intake, especially if you have high blood pressure. Talk with your health care provider or dietitian to get more information about reducing sodium.  What foods can I eat?  Grains    Breads, including Yi, white, jay, wheat, raisin, rye, oatmeal, and Italian. Tortillas that are neither fried nor made with lard or trans fat. Low-fat rolls, including hotdog and hamburger buns and English muffins. Biscuits. Muffins. Waffles. Pancakes. Light popcorn. Whole-grain cereals. Flatbread. [place] toast. Pretzels. Breadsticks. Rusks. Low-fat snacks and crackers, including oyster, saltine, matzo, ritu, animal, and rye. Rice and pasta, including brown rice and those that are made with whole wheat.  Vegetables  All vegetables.  Fruits  All fruits, but limit coconut.  Meats and Other Protein Sources  Lean, well-trimmed beef, veal, pork, and lamb. Chicken and turkey without skin. All fish and shellfish. Wild duck, rabbit, pheasant, and venison. Egg whites or low-cholesterol egg substitutes. Dried beans, peas, lentils, and tofu. Seeds and most nuts.  Dairy  Low-fat or nonfat cheeses, including ricotta, string, and mozzarella. Skim or 1% milk that is liquid, powdered, or evaporated. Buttermilk that is made with low-fat milk. Nonfat or low-fat yogurt.  Beverages  Mineral water. Diet carbonated beverages.  Sweets  and Desserts  Sherbets and fruit ices. Honey, jam, marmalade, jelly, and syrups. Meringues and gelatins. Pure sugar candy, such as hard candy, jelly beans, gumdrops, mints, marshmallows, and small amounts of dark chocolate. Brian food cake.  Eat all sweets and desserts in moderation.  Fats and Oils  Nonhydrogenated (trans-free) margarines. Vegetable oils, including soybean, sesame, sunflower, olive, peanut, safflower, corn, canola, and cottonseed. Salad dressings or mayonnaise that are made with a vegetable oil. Limit added fats and oils that you use for cooking, baking, salads, and as spreads.  Other  Cocoa powder. Coffee and tea. All seasonings and condiments.  The items listed above may not be a complete list of recommended foods or beverages. Contact your dietitian for more options.  What foods are not recommended?  Grains  Breads that are made with saturated or trans fats, oils, or whole milk. Croissants. Butter rolls. Cheese breads. Sweet rolls. Donuts. Buttered popcorn. Chow mein noodles. High-fat crackers, such as cheese or butter crackers.  Meats and Other Protein Sources  Fatty meats, such as hotdogs, short ribs, sausage, spareribs, smith, ribeye roast or steak, and mutton. High-fat deli meats, such as salami and bologna. Caviar. Domestic duck and goose. Organ meats, such as kidney, liver, sweetbreads, brains, gizzard, chitterlings, and heart.  Dairy  Cream, sour cream, cream cheese, and creamed cottage cheese. Whole milk cheeses, including blue (demetrio), [place] , Brie, Garcia, American, Hav[place]i, Swiss, cheddar, Camembert, and [place]. Whole or 2% milk that is liquid, evaporated, or condensed. Whole buttermilk. Cream sauce or high-fat cheese sauce. Yogurt that is made from whole milk.  Beverages  Regular sodas and drinks with added sugar.  Sweets and Desserts  Frosting. Pudding. Cookies. Cakes other than brian food cake. Candy that has milk chocolate or white chocolate, hydrogenated fat, butter,  coconut, or unknown ingredients. Buttered syrups. Full-fat ice cream or ice cream drinks.  Fats and Oils  Gravy that has suet, meat fat, or shortening. Cocoa butter, hydrogenated oils, palm oil, coconut oil, palm kernel oil. These can often be found in baked products, candy, fried foods, nondairy creamers, and whipped toppings. Solid fats and shortenings, including smith fat, salt pork, lard, and butter. Nondairy cream substitutes, such as coffee creamers and sour cream substitutes. Salad dressings that are made of unknown oils, cheese, or sour cream.  The items listed above may not be a complete list of foods and beverages to avoid. Contact your dietitian for more information.  This information is not intended to replace advice given to you by your health care provider. Make sure you discuss any questions you have with your health care provider.  Document Released: 09/26/2009 Document Revised: 07/07/2017 Document Reviewed: 06/11/2015  [employer] Interactive Patient Education © 2019 [employer]

## 2019-03-20 NOTE — PROGRESS NOTES
Problem list     Subjective   Lyndsay Franco is a 68 y.o. female     Chief Complaint   Patient presents with   • Follow-up     presents for stress test f/u (recently hospitalized for HTN)   • Chest Pain   • Palpitations   • Shortness of Breath   • Hypertension       HPI         Problem list  1.  Coronary artery disease  1.1 history of multiple stenting procedures in 2004 and  2016, inadequate data  1.2 stress test April 2018 demonstrates no evidence of ischemia and preserved LV function  1.31.  Stress test Scintigraphy demonstrates ischemia involving the inferoseptal segment, the basilar inferolateral segment, and the entire inferior wall.  2.  Peripheral vascular disease  2.1 history of iliac stenting bilaterally    2.2 follow-up femorofemoral bypass by Dr. Dupont at [facility] in [place]  2.2 follow-up CT angiogram May 2018 demonstrates occluded iliacs with femorofemoral bypass noted but no discussion on report in regards to patency or disease  2.3 intermittent claudication  2.4 questionable left renal artery stenosis  2.5 right renal artery stenting June 2018 by Dr. Mcallister and Dr. Dupont with no significant disease of the lower extremities identified  3.  Labile hypertension  4.  Infrarenal abdominal aortic aneurysm measuring 3 cm by CT scan May 2018  5.  Type 2 diabetes mellitus  6.  Dyslipidemia  7.  Chronic tobacco use  8.  Carotid artery stenosis  8.1 carotid duplex March 2019 demonstrates 50% left internal carotid artery stenosis on the left and less than 50% on the right with antegrade vertebral flow    Patient is a 68-year-old female that presents back to the office for follow-up.  Patient has history of significant vasculopathy.  She has had multiple interventions in the lower extremities and tells me that she has had a approximately 8 stents placed.  She has had lower extremity revascularization performed.  She is here today to review stress test and echocardiogram.  Echocardiogram suggest normal  systolic function, mild diastolic dysfunction, no significant regurgitant or stenotic valvular lesion and no effusion.  Stress test results and the carotid results are as above.    Patient describes having persistent and profound episodes of substernal discomfort.  It is described as a pressure and heaviness in her chest similar to what she felt with previous stenting and will occur at rest.  She describes arm weakness as well.  However, she does experience arm weakness outside of chest discomfort.  On one day, she went to the emergency room as she took 3 nitroglycerin which improved but not completely resolved her pain.  Workup was benign and she was discharged.    Patient describes having moderate levels of dyspnea but continues to smoke but wants information in regards to smoking cessation.  She does not describe PND orthopnea.    She will palpitate at times with no associated dizziness presyncope or syncope.  This happens on occasion.  She does describe occasional orthostatic lightheadedness but otherwise is doing well          Outpatient Encounter Medications as of 3/20/2019   Medication Sig Dispense Refill   • ALPRAZolam (XANAX) 0.5 MG tablet Take 0.5 mg by mouth 2 (Two) Times a Day As Needed.     • amLODIPine (NORVASC) 5 MG tablet Take 1 tablet by mouth 2 (Two) Times a Day. 60 tablet 4   • cetirizine (zyrTEC) 10 MG tablet Take 10 mg by mouth Daily.     • CloNIDine (CATAPRES) 0.1 MG tablet Take 1 tablet by mouth 3 (Three) Times a Day. 90 tablet 4   • clopidogrel (PLAVIX) 75 MG tablet Take 75 mg by mouth Daily.     • HYDROcodone-acetaminophen (NORCO)  MG per tablet prn     • isosorbide mononitrate (IMDUR) 30 MG 24 hr tablet Take 1 tablet by mouth Every Morning. 30 tablet 11   • losartan-hydrochlorothiazide (HYZAAR) 100-25 MG per tablet Take 1 tablet by mouth Daily. 30 tablet 4   • meloxicam (MOBIC) 15 MG tablet TAKE 1 TABLET EVERY DAY 30 tablet 6   • metFORMIN (GLUCOPHAGE) 500 MG tablet 2 (Two) Times a  Day.     • metoprolol succinate XL (TOPROL-XL) 100 MG 24 hr tablet Take 1 tablet by mouth Daily. 90 tablet 3   • montelukast (SINGULAIR) 10 MG tablet Take 10 mg by mouth Every Night.     • nitroglycerin (NITROSTAT) 0.4 MG SL tablet As Needed.     • pantoprazole (PROTONIX) 40 MG EC tablet Daily.     • pravastatin (PRAVACHOL) 20 MG tablet Take 20 mg by mouth Every Night.     • senna (SENOKOT) 8.6 MG tablet tablet Take  by mouth Every Night.     • traZODone (DESYREL) 50 MG tablet Take 50 mg by mouth Every Night.     • aspirin 81 MG tablet Take 1 tablet by mouth Daily. 30 tablet 11   • busPIRone (BUSPAR) 10 MG tablet Take 1 tablet by mouth 3 (Three) Times a Day. 30 tablet 1   • [DISCONTINUED] varenicline (CHANTIX STARTING MONTH PAK) 0.5 MG X 11 & 1 MG X 42 tablet Take 0.5 mg one daily on days 1-3 and and 0.5 mg twice daily on days 4-7.Then 1 mg twice daily for a total of 12 weeks. 1 tablet 0     No facility-administered encounter medications on file as of 3/20/2019.        Patient has no known allergies.    Past Medical History:   Diagnosis Date   • Abnormal EKG 3/6/2019   • Anxiety    • Arthritis    • Coronary artery disease    • Depression    • Diabetes mellitus (CMS/HCC)    • GERD (gastroesophageal reflux disease)    • Hiatal hernia    • Hyperlipidemia    • Hypertension    • Myocardial infarction (CMS/HCC)    • Non-functioning kidney     Left    • Peripheral vascular disease (CMS/HCC)    • Renal artery aneurysm (CMS/HCC)    • Systolic ejection murmur 3/27/2018       Social History     Socioeconomic History   • Marital status:      Spouse name: Not on file   • Number of children: 3   • Years of education: Not on file   • Highest education level: Not on file   Occupational History   • Occupation: [employer]     Comment: Disabled-PVD   Tobacco Use   • Smoking status: Current Every Day Smoker     Packs/day: 0.75     Years: 40.00     Pack years: 30.00     Types: Cigarettes, Electronic Cigarette  "    Last attempt to quit: 2018     Years since quittin.6   • Smokeless tobacco: Never Used   Substance and Sexual Activity   • Alcohol use: No   • Drug use: No   • Sexual activity: Defer   Social History Narrative    Lives in [place] alone.        Family History   Adopted: Yes   Problem Relation Age of Onset   • No Known Problems Mother    • No Known Problems Father        Review of Systems   Constitutional: Positive for fatigue.   HENT: Positive for ear pain.    Eyes: Negative.    Respiratory: Positive for shortness of breath (with daily activity and times at rest).    Cardiovascular: Positive for chest pain (radiates to left arm) and palpitations (racing). Negative for leg swelling.   Gastrointestinal: Negative.    Endocrine: Negative.    Genitourinary: Negative.    Musculoskeletal: Positive for arthralgias.   Skin: Negative.    Allergic/Immunologic: Positive for environmental allergies.   Neurological: Positive for light-headedness (fluid on ear).   Hematological: Bruises/bleeds easily.   Psychiatric/Behavioral: Positive for agitation and sleep disturbance (insomnia). The patient is nervous/anxious.        Objective   Vitals:    19 1029   BP: 121/72   BP Location: Left arm   Patient Position: Sitting   Pulse: 69   SpO2: 97%   Weight: 89.3 kg (196 lb 12.8 oz)   Height: 162.6 cm (64\")      /72 (BP Location: Left arm, Patient Position: Sitting)   Pulse 69   Ht 162.6 cm (64\")   Wt 89.3 kg (196 lb 12.8 oz)   SpO2 97%   BMI 33.78 kg/m²     Lab Results (most recent)     None          Physical Exam   Constitutional: She is oriented to person, place, and time. She appears well-developed and well-nourished. No distress.   HENT:   Head: Normocephalic and atraumatic.   Eyes: EOM are normal. Pupils are equal, round, and reactive to light.   Neck: No JVD present. Carotid bruit is present.   Cardiovascular: Normal rate, regular rhythm, normal heart sounds and intact distal pulses. Exam reveals no " gallop and no friction rub.   No murmur heard.  Pulses:       Radial pulses are 0 on the right side, and 2+ on the left side.   Pulmonary/Chest: Effort normal and breath sounds normal. No respiratory distress. She has no wheezes. She has no rales. She exhibits no tenderness.   Musculoskeletal: Normal range of motion. She exhibits no edema.   Neurological: She is alert and oriented to person, place, and time. No cranial nerve deficit.   Skin: Skin is warm and dry. No rash noted. No erythema. No pallor.   Psychiatric: She has a normal mood and affect. Her behavior is normal.   Nursing note and vitals reviewed.      Procedure   Procedures       Assessment/Plan     Problems Addressed this Visit        Cardiovascular and Mediastinum    Coronary artery disease involving native coronary artery of native heart without angina pectoris    Relevant Orders    [facility] Cath    CBC & Differential    Comprehensive Metabolic Panel    PVD (peripheral vascular disease) with claudication (CMS/HCC)    Relevant Orders    US Arterial Doppler Upper Extremity Right       Respiratory    Shortness of breath - Primary    Relevant Orders    [facility] Cath    CBC & Differential    Comprehensive Metabolic Panel       Nervous and Auditory    Chest pain    Relevant Orders    [facility] Cath    CBC & Differential    Comprehensive Metabolic Panel    Arm pain, anterior, right    Relevant Orders    US Arterial Doppler Upper Extremity Right            Recommendation  1.  Patient with coronary artery disease with multiple stenting procedures performed and revascularization of the lower extremities.  She is a vasculopath that has continued to smoke.  Stress test Scintigraphy demonstrates ischemia involving the inferoseptal segment, the basilar inferolateral segment, and the entire inferior wall.  She is having resting chest discomfort despite being on antianginal medication and has had recent emergency room visit.  I feel expedited  catheterization is warranted and will schedule.  She is on dual antiplatelet therapy and antianginal therapy.  I am giving her samples of Ranexa until catheterization can be performed.  2.  She does not have a good radial pulse in the right upper extremity although a palpable pulse in the left upper extremity.  She describes having a right radial cath in the past and I wonder if it is possible that she had thrombosis of the radial artery as she has no palpable pulse.  I would like to get an ultrasound to evaluate arterial circulation of the right upper extremity but patient may need a left radial or right femoral approach at catheterization to be determined at Dr. Suggs's discretion.  3.  Any chest pain, not resolved by nitroglycerin, she will go to the ER.  Otherwise she will follow with primary as scheduled       I advised Lyndsay of the risks of continuing to use tobacco, and I provided her with tobacco cessation educational materials in the After Visit Summary.     During this visit, I spent <3 minutes counseling the patient regarding tobacco cessation.     Patient's Body mass index is 33.78 kg/m². BMI is above normal parameters. Recommendations include: educational material.       Electronically signed by:

## 2019-03-21 LAB
ALBUMIN SERPL-MCNC: 4.03 G/DL (ref 3.5–5.2)
ALBUMIN/GLOB SERPL: 1.6 G/DL
ALP SERPL-CCNC: 123 U/L (ref 39–117)
ALT SERPL W P-5'-P-CCNC: 18 U/L (ref 1–33)
ANION GAP SERPL CALCULATED.3IONS-SCNC: 15.7 MMOL/L
AST SERPL-CCNC: 19 U/L (ref 1–32)
BILIRUB SERPL-MCNC: 0.2 MG/DL (ref 0.2–1.2)
BUN BLD-MCNC: 20 MG/DL (ref 8–23)
BUN/CREAT SERPL: 20.8 (ref 7–25)
CALCIUM SPEC-SCNC: 9.4 MG/DL (ref 8.6–10.5)
CHLORIDE SERPL-SCNC: 99 MMOL/L (ref 98–107)
CO2 SERPL-SCNC: 23.3 MMOL/L (ref 22–29)
CREAT BLD-MCNC: 0.96 MG/DL (ref 0.57–1)
DEPRECATED RDW RBC AUTO: 41.4 FL (ref 37–54)
EOSINOPHIL # BLD MANUAL: 0.05 10*3/MM3 (ref 0–0.4)
EOSINOPHIL NFR BLD MANUAL: 1 % (ref 0.3–6.2)
ERYTHROCYTE [DISTWIDTH] IN BLOOD BY AUTOMATED COUNT: 12.6 % (ref 12.3–15.4)
GFR SERPL CREATININE-BSD FRML MDRD: 58 ML/MIN/1.73
GLOBULIN UR ELPH-MCNC: 2.6 GM/DL
GLUCOSE BLD-MCNC: 161 MG/DL (ref 65–99)
HCT VFR BLD AUTO: 36.2 % (ref 34–46.6)
HGB BLD-MCNC: 11.3 G/DL (ref 12–15.9)
HYPOCHROMIA BLD QL: ABNORMAL
LYMPHOCYTES # BLD MANUAL: 2.52 10*3/MM3 (ref 0.7–3.1)
LYMPHOCYTES NFR BLD MANUAL: 14 % (ref 5–12)
LYMPHOCYTES NFR BLD MANUAL: 47 % (ref 19.6–45.3)
MCH RBC QN AUTO: 29.1 PG (ref 26.6–33)
MCHC RBC AUTO-ENTMCNC: 31.2 G/DL (ref 31.5–35.7)
MCV RBC AUTO: 93.3 FL (ref 79–97)
MONOCYTES # BLD AUTO: 0.75 10*3/MM3 (ref 0.1–0.9)
NEUTROPHILS # BLD AUTO: 2.04 10*3/MM3 (ref 1.4–7)
NEUTROPHILS NFR BLD MANUAL: 38 % (ref 42.7–76)
PLAT MORPH BLD: NORMAL
PLATELET # BLD AUTO: 329 10*3/MM3 (ref 140–450)
PMV BLD AUTO: 11.3 FL (ref 6–12)
POTASSIUM BLD-SCNC: 4.7 MMOL/L (ref 3.5–5.2)
PROT SERPL-MCNC: 6.6 G/DL (ref 6–8.5)
RBC # BLD AUTO: 3.88 10*6/MM3 (ref 3.77–5.28)
SCAN SLIDE: NORMAL
SODIUM BLD-SCNC: 138 MMOL/L (ref 136–145)
WBC NRBC COR # BLD: 5.37 10*3/MM3 (ref 3.4–10.8)

## 2019-03-26 ENCOUNTER — HOSPITAL ENCOUNTER (OUTPATIENT)
Dept: CARDIOLOGY | Facility: HOSPITAL | Age: 69
Discharge: HOME | End: 2019-03-26
Admitting: PHYSICIAN ASSISTANT

## 2019-03-26 DIAGNOSIS — I73.9 PVD (PERIPHERAL VASCULAR DISEASE) (HCC): ICD-10-CM

## 2019-03-26 DIAGNOSIS — M79.601 ARM PAIN, ANTERIOR, RIGHT: ICD-10-CM

## 2019-03-26 DIAGNOSIS — I73.9 PVD (PERIPHERAL VASCULAR DISEASE) WITH CLAUDICATION (HCC): ICD-10-CM

## 2019-03-26 PROCEDURE — 93931 UPPER EXTREMITY STUDY: CPT | Performed by: INTERNAL MEDICINE

## 2019-03-26 PROCEDURE — 93931 UPPER EXTREMITY STUDY: CPT

## 2019-03-27 ENCOUNTER — OUTSIDE FACILITY SERVICE (OUTPATIENT)
Dept: CARDIOLOGY | Facility: CLINIC | Age: 69
End: 2019-03-27

## 2019-03-27 DIAGNOSIS — I73.9 PVD (PERIPHERAL VASCULAR DISEASE) (HCC): ICD-10-CM

## 2019-03-27 DIAGNOSIS — R06.02 SHORTNESS OF BREATH: ICD-10-CM

## 2019-03-27 DIAGNOSIS — R07.9 CHEST PAIN, UNSPECIFIED TYPE: ICD-10-CM

## 2019-03-27 DIAGNOSIS — I25.10 CORONARY ARTERY DISEASE INVOLVING NATIVE CORONARY ARTERY OF NATIVE HEART WITHOUT ANGINA PECTORIS: ICD-10-CM

## 2019-03-27 PROCEDURE — 92928 PRQ TCAT PLMT NTRAC ST 1 LES: CPT | Performed by: INTERNAL MEDICINE

## 2019-03-27 PROCEDURE — 92978 ENDOLUMINL IVUS OCT C 1ST: CPT | Performed by: INTERNAL MEDICINE

## 2019-03-27 PROCEDURE — 93458 L HRT ARTERY/VENTRICLE ANGIO: CPT | Performed by: INTERNAL MEDICINE

## 2019-03-31 LAB
BH CV ECHO MEAS - BSA(HAYCOCK): 2 M^2
BH CV ECHO MEAS - BSA: 1.9 M^2
BH CV ECHO MEAS - BZI_BMI: 33.6 KILOGRAMS/M^2
BH CV ECHO MEAS - BZI_METRIC_HEIGHT: 162.6 CM
BH CV ECHO MEAS - BZI_METRIC_WEIGHT: 88.9 KG
BH CV UPPER DUPLEX RIGHT AXILLARY ART EDV: 0 CM/S
BH CV UPPER DUPLEX RIGHT AXILLARY ARTERY PSV: 139 CM/S
BH CV UPPER DUPLEX RIGHT BRACHIA ART EDV: 0 CM/S
BH CV UPPER DUPLEX RIGHT BRACHIAL ART PSV: 188 CM/S
BH CV UPPER DUPLEX RIGHT RADIAL ART EDV: 0 CM/S
BH CV UPPER DUPLEX RIGHT RADIAL ART PSV: 67 CM/S
BH CV UPPER DUPLEX RIGHT SUBCLAVIAN ART EDV: 0 CM/S
BH CV UPPER DUPLEX RIGHT SUBCLAVIAN ART PSV: 131 CM/S
BH CV UPPER DUPLEX RIGHT ULNAR ART EDV: 0 CM/S
BH CV UPPER DUPLEX RIGHT ULNAR ART PSV: 114 CM/S
BH CV XLRA MEAS RIGHT PROX SCLA EDV: 0 CM/SEC
BH CV XLRA MEAS RIGHT PROX SCLA PSV: 107 CM/SEC

## 2019-04-03 ENCOUNTER — OFFICE VISIT (OUTPATIENT)
Dept: CARDIOLOGY | Facility: CLINIC | Age: 69
End: 2019-04-03

## 2019-04-03 ENCOUNTER — HOSPITAL ENCOUNTER (OUTPATIENT)
Dept: CARDIOLOGY | Facility: HOSPITAL | Age: 69
Discharge: HOME | End: 2019-04-03
Admitting: PHYSICIAN ASSISTANT

## 2019-04-03 VITALS
SYSTOLIC BLOOD PRESSURE: 135 MMHG | HEIGHT: 64 IN | DIASTOLIC BLOOD PRESSURE: 70 MMHG | BODY MASS INDEX: 31.92 KG/M2 | OXYGEN SATURATION: 96 % | WEIGHT: 187 LBS | HEART RATE: 84 BPM

## 2019-04-03 DIAGNOSIS — M79.601 ARM PAIN, ANTERIOR, RIGHT: ICD-10-CM

## 2019-04-03 DIAGNOSIS — T14.8XXA HEMATOMA: ICD-10-CM

## 2019-04-03 DIAGNOSIS — I73.9 CLAUDICATION (HCC): ICD-10-CM

## 2019-04-03 DIAGNOSIS — Z98.890 S/P CARDIAC CATH: ICD-10-CM

## 2019-04-03 DIAGNOSIS — R07.9 CHEST PAIN, UNSPECIFIED TYPE: Primary | ICD-10-CM

## 2019-04-03 DIAGNOSIS — R06.02 SHORTNESS OF BREATH: ICD-10-CM

## 2019-04-03 DIAGNOSIS — I25.10 CORONARY ARTERY DISEASE INVOLVING NATIVE CORONARY ARTERY OF NATIVE HEART WITHOUT ANGINA PECTORIS: ICD-10-CM

## 2019-04-03 PROCEDURE — 93931 UPPER EXTREMITY STUDY: CPT | Performed by: INTERNAL MEDICINE

## 2019-04-03 PROCEDURE — 93931 UPPER EXTREMITY STUDY: CPT

## 2019-04-03 PROCEDURE — 99214 OFFICE O/P EST MOD 30 MIN: CPT | Performed by: PHYSICIAN ASSISTANT

## 2019-04-03 NOTE — PATIENT INSTRUCTIONS
Steps to Quit Smoking  Smoking tobacco can be bad for your health. It can also affect almost every organ in your body. Smoking puts you and people around you at risk for many serious long-lasting (chronic) diseases. Quitting smoking is hard, but it is one of the best things that you can do for your health. It is never too late to quit.  What are the benefits of quitting smoking?  When you quit smoking, you lower your risk for getting serious diseases and conditions. They can include:  · Lung cancer or lung disease.  · Heart disease.  · Stroke.  · Heart attack.  · Not being able to have children (infertility).  · Weak bones (osteoporosis) and broken bones (fractures).    If you have coughing, wheezing, and shortness of breath, those symptoms may get better when you quit. You may also get sick less often. If you are pregnant, quitting smoking can help to lower your chances of having a baby of low birth weight.  What can I do to help me quit smoking?  Talk with your doctor about what can help you quit smoking. Some things you can do (strategies) include:  · Quitting smoking totally, instead of slowly cutting back how much you smoke over a period of time.  · Going to in-person counseling. You are more likely to quit if you go to many counseling sessions.  · Using resources and support systems, such as:  ? Online chats with a counselor.  ? Phone quitlines.  ? Printed self-help materials.  ? Support groups or group counseling.  ? Text messaging programs.  ? Mobile phone apps or applications.  · Taking medicines. Some of these medicines may have nicotine in them. If you are pregnant or breastfeeding, do not take any medicines to quit smoking unless your doctor says it is okay. Talk with your doctor about counseling or other things that can help you.    Talk with your doctor about using more than one strategy at the same time, such as taking medicines while you are also going to in-person counseling. This can help make  quitting easier.  What things can I do to make it easier to quit?  Quitting smoking might feel very hard at first, but there is a lot that you can do to make it easier. Take these steps:  · Talk to your family and friends. Ask them to support and encourage you.  · Call phone quitlines, reach out to support groups, or work with a counselor.  · Ask people who smoke to not smoke around you.  · Avoid places that make you want (trigger) to smoke, such as:  ? Bars.  ? Parties.  ? Smoke-break areas at work.  · Spend time with people who do not smoke.  · Lower the stress in your life. Stress can make you want to smoke. Try these things to help your stress:  ? Getting regular exercise.  ? Deep-breathing exercises.  ? Yoga.  ? Meditating.  ? Doing a body scan. To do this, close your eyes, focus on one area of your body at a time from head to toe, and notice which parts of your body are tense. Try to relax the muscles in those areas.  · Download or buy apps on your mobile phone or tablet that can help you stick to your quit plan. There are many free apps, such as QuitGuide from the CDC (Centers for Disease Control and Prevention). You can find more support from smokefree.gov and other websites.    This information is not intended to replace advice given to you by your health care provider. Make sure you discuss any questions you have with your health care provider.  Document Released: 10/14/2010 Document Revised: 08/15/2017 Document Reviewed: 05/03/2016  [employer] Interactive Patient Education © 2019 [employer]  Fat and Cholesterol Restricted Eating Plan  Getting too much fat and cholesterol in your diet may cause health problems. Choosing the right foods helps keep your fat and cholesterol at normal levels. This can keep you from getting certain diseases.  Your doctor may recommend an eating plan that includes:  · Total fat: ______% or less of total calories a day.  · Saturated fat: ______% or less of total calories a  "day.  · Cholesterol: less than _________mg a day.  · Fiber: ______g a day.    What are tips for following this plan?  General tips  · Work with your doctor to lose weight if you need to.  · Avoid:  ? Foods with added sugar.  ? Fried foods.  ? Foods with partially hydrogenated oils.  · Limit alcohol intake to no more than 1 drink a day for nonpregnant women and 2 drinks a day for men. One drink equals 12 oz of beer, 5 oz of wine, or 1½ oz of hard liquor.  Reading food labels  · Check food labels for:  ? Trans fats.  ? Partially hydrogenated oils.  ? Saturated fat (g) in each serving.  ? Cholesterol (mg) in each serving.  ? Fiber (g) in each serving.  · Choose foods with healthy fats, such as:  ? Monounsaturated fats.  ? Polyunsaturated fats.  ? Omega-3 fats.  · Choose grain products that have whole grains. Look for the word \"whole\" as the first word in the ingredient list.  Cooking  · Cook foods using low-fat methods. These include baking, boiling, grilling, and broiling.  · Eat more home-cooked foods. Eat at restaurants and buffets less often.  · Avoid cooking using saturated fats, such as butter, cream, palm oil, palm kernel oil, and coconut oil.  Meal planning  · At meals, divide your plate into four equal parts:  ? Fill one-half of your plate with vegetables and green salads.  ? Fill one-fourth of your plate with whole grains.  ? Fill one-fourth of your plate with low-fat (lean) protein foods.  · Eat fish that is high in omega-3 fats at least two times a week. This includes mackerel, tuna, sardines, and salmon.  · Eat foods that are high in fiber, such as whole grains, beans, apples, broccoli, carrots, peas, and barley.  Recommended foods  Grains  · Whole grains, such as whole wheat or whole grain breads, crackers, cereals, and pasta. Unsweetened oatmeal, bulgur, barley, quinoa, or brown rice. Corn or whole wheat flour tortillas.  Vegetables  · Fresh or frozen vegetables (raw, steamed, roasted, or grilled). Green " salads.  Fruits  · All fresh, canned (in natural juice), or frozen fruits.  Meats and other protein foods  · Ground beef (85% or leaner), grass-fed beef, or beef trimmed of fat. Skinless chicken or turkey. Ground chicken or turkey. Pork trimmed of fat. All fish and seafood. Egg whites. Dried beans, peas, or lentils. Unsalted nuts or seeds. Unsalted canned beans. Nut butters without added sugar or oil.  Dairy  · Low-fat or nonfat dairy products, such as skim or 1% milk, 2% or reduced-fat cheeses, low-fat and fat-free ricotta or cottage cheese, or plain low-fat and nonfat yogurt.  Fats and oils  · Tub margarine without trans fats. Light or reduced-fat mayonnaise and salad dressings. Avocado. Olive, canola, sesame, or safflower oils.  The items listed above may not be a complete list of recommended foods or beverages. Contact your dietitian for more options.  Foods to avoid  Grains  · White bread. White pasta. White rice. Cornbread. Bagels, pastries, and croissants. Crackers and snack foods that contain trans fat and hydrogenated oils.  Vegetables  · Vegetables cooked in cheese, cream, or butter sauce. Fried vegetables.  Fruits  · Canned fruit in heavy syrup. Fruit in cream or butter sauce. Fried fruit.  Meats and other protein foods  · Fatty cuts of meat. Ribs, chicken wings, smith, sausage, bologna, salami, chitterlings, fatback, hot dogs, bratwurst, and packaged lunch meats. Liver and organ meats. Whole eggs and egg yolks. Chicken and turkey with skin. Fried meat.  Dairy  · Whole or 2% milk, cream, half-and-half, and cream cheese. Whole milk cheeses. Whole-fat or sweetened yogurt. Full-fat cheeses. Nondairy creamers and whipped toppings. Processed cheese, cheese spreads, and cheese curds.  Beverages  · Alcohol. Sugar-sweetened drinks such as sodas, lemonade, and fruit drinks.  Fats and oils  · Butter, stick margarine, lard, shortening, ghee, or smith fat. Coconut, palm kernel, and palm oils.  Sweets and  desserts  · Corn syrup, sugars, honey, and molasses. Candy. Jam and jelly. Syrup. Sweetened cereals. Cookies, pies, cakes, donuts, muffins, and ice cream.  The items listed above may not be a complete list of foods and beverages to avoid. Contact your dietitian for more information.  Summary  · Choosing the right foods helps keep your fat and cholesterol at normal levels. This can keep you from getting certain diseases.  · At meals, fill one-half of your plate with vegetables and green salads.  · Eat high-fiber foods, like whole grains, beans, apples, carrots, peas, and barley.  · Limit added sugar, saturated fats, alcohol, and fried foods.  This information is not intended to replace advice given to you by your health care provider. Make sure you discuss any questions you have with your health care provider.  Document Released: 06/18/2013 Document Revised: 09/04/2018 Document Reviewed: 09/04/2018  [employer] Interactive Patient Education © 2019 [employer]  BMI for Adults  Body mass index (BMI) is a number that is calculated from a person's weight and height. In most adults, the number is used to find how much of an adult's weight is made up of fat. BMI is not as accurate as a direct measure of body fat.  How is BMI calculated?  BMI is calculated by dividing weight in kilograms by height in meters squared. It can also be calculated by dividing weight in pounds by height in inches squared, then multiplying the resulting number by 703. Charts are available to help you find your BMI quickly and easily without doing this calculation.  How is BMI interpreted?  Health care professionals use BMI charts to identify whether an adult is underweight, at a normal weight, or overweight based on the following guidelines:  · Underweight: BMI less than 18.5.  · Normal weight: BMI between 18.5 and 24.9.  · Overweight: BMI between 25 and 29.9.  · Obese: BMI of 30 and above.    BMI is usually interpreted the same for males and  females.  Weight includes both fat and muscle, so someone with a muscular build, such as an athlete, may have a BMI that is higher than 24.9. In cases like these, BMI may not accurately depict body fat. To determine if excess body fat is the cause of a BMI of 25 or higher, further assessments may need to be done by a health care provider.  Why is BMI a useful tool?  BMI is used to identify a possible weight problem that may be related to a medical problem or may increase the risk for medical problems. BMI can also be used to promote changes to reach a healthy weight.  This information is not intended to replace advice given to you by your health care provider. Make sure you discuss any questions you have with your health care provider.  Document Released: 08/29/2005 Document Revised: 04/27/2017 Document Reviewed: 05/15/2015  [employer] Interactive Patient Education © 2018 [employer]

## 2019-04-03 NOTE — PROGRESS NOTES
Problem list     Subjective   Lyndsay Franco is a 68 y.o. female     Chief Complaint   Patient presents with   • Coronary Artery Disease   • Chest Pain   • Shortness of Breath       HPI      Problem list  1.  Coronary artery disease  1.1 history of multiple stenting procedures in 2004 and  2016, inadequate data  1.2 stress test April 2018 demonstrates no evidence of ischemia and preserved LV function  1.31.  Stress test Scintigraphy demonstrates ischemia involving the inferoseptal segment, the basilar inferolateral segment, and the entire inferior wall.  1.4 cardiac catheterization March 2019 demonstrated chronic total occlusion of the RCA.  Nonobstructive LAD with high-grade stenosis involving the circumflex and obtuse marginal.  Angioplasty and stenting of the LAD and circumflex and stenting of the obtuse marginal.  Medical management recommended in recanalization of the  if symptoms did not improve  2.  Peripheral vascular disease  2.1 history of iliac stenting bilaterally    2.2 follow-up femorofemoral bypass by Dr. Dupont at [facility] in [place]  2.2 follow-up CT angiogram May 2018 demonstrates occluded iliacs with femorofemoral bypass noted but no discussion on report in regards to patency or disease  2.3 intermittent claudication  2.4 questionable left renal artery stenosis  2.5 right renal artery stenting June 2018 by Dr. Mcallister and Dr. Dupont with no significant disease of the lower extremities identified  3.  Labile hypertension  4.  Infrarenal abdominal aortic aneurysm measuring 3 cm by CT scan May 2018  5.  Type 2 diabetes mellitus  6.  Dyslipidemia  7.  Chronic tobacco use  8.  Carotid artery stenosis  8.1 carotid duplex March 2019 demonstrates 50% left internal carotid artery stenosis on the left and less than 50% on the right with antegrade vertebral flow      Patient is a 68-year-old female that presents back for follow-up from cardiac catheterization.  She feels remarkably improved.  She  occasionally will have a discomfort substernally such as a sharp pain.  This is nothing like what she felt previous to stenting.  She was taking nitroglycerin frequently with complaints of chest pressure and arm discomfort.  She still will occasionally get numbness in her arm but describes that she feels much improved.  She has mild to moderate levels of exertional dyspnea.  She continues to smoke but is trying to quit.  She has no PND orthopnea.    She does not palpitate.  She has no presyncope or syncope.  She otherwise is doing well      Outpatient Encounter Medications as of 4/3/2019   Medication Sig Dispense Refill   • ALPRAZolam (XANAX) 0.5 MG tablet Take 0.5 mg by mouth 2 (Two) Times a Day As Needed.     • amLODIPine (NORVASC) 5 MG tablet Take 1 tablet by mouth 2 (Two) Times a Day. 60 tablet 4   • aspirin 81 MG tablet Take 1 tablet by mouth Daily. 30 tablet 11   • busPIRone (BUSPAR) 10 MG tablet Take 1 tablet by mouth 3 (Three) Times a Day. 30 tablet 1   • cetirizine (zyrTEC) 10 MG tablet Take 10 mg by mouth Daily.     • CloNIDine (CATAPRES) 0.1 MG tablet Take 1 tablet by mouth 3 (Three) Times a Day. 90 tablet 4   • clopidogrel (PLAVIX) 75 MG tablet Take 75 mg by mouth Daily.     • HYDROcodone-acetaminophen (NORCO)  MG per tablet prn     • isosorbide mononitrate (IMDUR) 30 MG 24 hr tablet Take 1 tablet by mouth Every Morning. 30 tablet 11   • losartan-hydrochlorothiazide (HYZAAR) 100-25 MG per tablet Take 1 tablet by mouth Daily. 30 tablet 4   • meloxicam (MOBIC) 15 MG tablet TAKE 1 TABLET EVERY DAY 30 tablet 6   • metFORMIN (GLUCOPHAGE) 500 MG tablet 2 (Two) Times a Day.     • metoprolol succinate XL (TOPROL-XL) 100 MG 24 hr tablet Take 1 tablet by mouth Daily. 90 tablet 3   • montelukast (SINGULAIR) 10 MG tablet Take 10 mg by mouth Every Night.     • nitroglycerin (NITROSTAT) 0.4 MG SL tablet As Needed.     • pantoprazole (PROTONIX) 40 MG EC tablet Daily.     • pravastatin (PRAVACHOL) 20 MG tablet  Take 20 mg by mouth Every Night.     • senna (SENOKOT) 8.6 MG tablet tablet Take  by mouth Every Night.     • traZODone (DESYREL) 50 MG tablet Take 50 mg by mouth Every Night.       No facility-administered encounter medications on file as of 4/3/2019.        Patient has no known allergies.    Past Medical History:   Diagnosis Date   • Abnormal EKG 3/6/2019   • Anxiety    • Arthritis    • Coronary artery disease    • Depression    • Diabetes mellitus (CMS/HCC)    • GERD (gastroesophageal reflux disease)    • Hiatal hernia    • Hyperlipidemia    • Hypertension    • Myocardial infarction (CMS/HCC)    • Non-functioning kidney     Left    • Peripheral vascular disease (CMS/HCC)    • Renal artery aneurysm (CMS/HCC)    • Systolic ejection murmur 3/27/2018       Social History     Socioeconomic History   • Marital status:      Spouse name: Not on file   • Number of children: 3   • Years of education: Not on file   • Highest education level: Not on file   Occupational History   • Occupation: [employer]     Comment: Disabled-PVD   Tobacco Use   • Smoking status: Current Every Day Smoker     Packs/day: 0.75     Years: 40.00     Pack years: 30.00     Types: Cigarettes, Electronic Cigarette   • Smokeless tobacco: Never Used   Substance and Sexual Activity   • Alcohol use: No   • Drug use: No   • Sexual activity: Defer   Social History Narrative    Lives in [place] alone.        Family History   Adopted: Yes   Problem Relation Age of Onset   • No Known Problems Mother    • No Known Problems Father        Review of Systems   Constitutional: Negative for chills, fatigue and fever.   HENT: Positive for congestion and rhinorrhea. Negative for sore throat.    Eyes: Positive for visual disturbance (Reading glasses ).   Respiratory: Positive for cough (Productive cough with white sputum ) and shortness of breath. Negative for chest tightness.    Cardiovascular: Positive for chest pain. Negative for  "palpitations and leg swelling.   Gastrointestinal: Negative.    Endocrine: Negative.  Negative for cold intolerance and heat intolerance.   Genitourinary: Negative.    Musculoskeletal: Positive for arthralgias (Joints ). Negative for back pain.        Pain in left ribcage since stents were put in    Skin: Positive for color change (Bruises to right wrist and forearm from heart cath, still having a lot of discomfort ). Negative for rash and wound.   Allergic/Immunologic: Negative.  Negative for environmental allergies and food allergies.   Neurological: Positive for weakness (Some weakness all over ) and light-headedness (Occasional ). Negative for dizziness.   Hematological: Bruises/bleeds easily (Bruises ).   Psychiatric/Behavioral: Negative.  Negative for sleep disturbance (Denies waking with smothering or SOA).   All other systems reviewed and are negative.      Objective   Vitals:    04/03/19 1029   BP: 135/70   BP Location: Left arm   Patient Position: Sitting   Pulse: 84   SpO2: 96%   Weight: 84.8 kg (187 lb)   Height: 162.6 cm (64\")      /70 (BP Location: Left arm, Patient Position: Sitting)   Pulse 84   Ht 162.6 cm (64\")   Wt 84.8 kg (187 lb)   SpO2 96%   BMI 32.10 kg/m²     Lab Results (most recent)     None          Physical Exam   Constitutional: She is oriented to person, place, and time. She appears well-developed and well-nourished. No distress.   HENT:   Head: Normocephalic and atraumatic.   Eyes: EOM are normal. Pupils are equal, round, and reactive to light.   Neck: No JVD present. Carotid bruit is present.   Cardiovascular: Normal rate, regular rhythm, normal heart sounds and intact distal pulses. Exam reveals no gallop and no friction rub.   No murmur heard.  Pulmonary/Chest: Effort normal and breath sounds normal. No respiratory distress. She has no wheezes. She has no rales. She exhibits no tenderness.   Musculoskeletal: Normal range of motion. She exhibits no edema.   Neurological: " She is alert and oriented to person, place, and time. No cranial nerve deficit.   Skin: Skin is warm and dry. No rash noted. No erythema. No pallor.   Psychiatric: She has a normal mood and affect. Her behavior is normal.   Nursing note and vitals reviewed.      Procedure   Procedures       Assessment/Plan     Problems Addressed this Visit        Cardiovascular and Mediastinum    Coronary artery disease involving native coronary artery of native heart without angina pectoris       Respiratory    Shortness of breath       Nervous and Auditory    Chest pain - Primary    Claudication (CMS/HCC)    Relevant Orders    Duplex Upper Extremity Art / Grafts - Right CAR    Arm pain, anterior, right    Relevant Orders    Duplex Upper Extremity Art / Grafts - Right CAR       Other    Hematoma    Relevant Orders    Duplex Upper Extremity Art / Grafts - Right CAR    S/P cardiac cath    Relevant Orders    Duplex Upper Extremity Art / Grafts - Right CAR            Recommendation  1.  Patient doing well.  Patient symptoms have remarkably improved.  She is doing well on antianginal therapy.  We did discuss the chronic total occlusion of the RCA but she does not want any intervention unless symptoms were to worsen.  2.  She is complaining of arm pain.  We will check arterial duplex to ensure no evidence of pseudoaneurysm.  3.  We will continue current medical regimen and see her back for follow-up in 1-3 months.  She will follow with primary as scheduled         I advised Lyndsay of the risks of continuing to use tobacco, and I provided her with tobacco cessation educational materials in the After Visit Summary.     During this visit, I spent >3 minutes counseling the patient regarding tobacco cessation.     Patient's Body mass index is 32.1 kg/m². BMI is above normal parameters. Recommendations include: educational material and referral to primary care.       Electronically signed by:

## 2019-04-04 ENCOUNTER — TELEPHONE (OUTPATIENT)
Dept: CARDIOLOGY | Facility: CLINIC | Age: 69
End: 2019-04-04

## 2019-04-04 NOTE — TELEPHONE ENCOUNTER
4/15/19  Rec'd a call from Rosario at [facility] and she was able to authorize the precert request for [id number].  Authorization # [id number] which was good from 3/27/19 to 4/26/19.  She did confirm that [id number] does not need a precert.  CALVIN ALVAREZ    4/12/19 Did not receive paperwork so [facility] phone numbers to try to get paperwork. Unable to speak to a person. Called back to [phone] and they were able to get me to Jia the nurse reviewer. She asked additional questions and requested the cardiac cath report as well as additional information on patient symptoms etc. I faxed the cath report, Carotid artery duplex and last office note prior to procedure. These were faxed to [phone].  CALVIN ALVAREZ    4/10/19  11:55am. Rec'd a msg that [facility] had called back ([phone]) about precert. I returned their call and was told that they did not have any of the information I had already given. I spoke with [place] and she took the information again and said that they need to fax paperwork that needs to be filled out and faxed back for further review. She said that CPT 74311 is not something that they can review for precert. The paperwork will be for [id number] only. Tracking number for this call is [id number].  CALVIN ALVAREZ    4/8/19 5pm  [facility] on status of [facility]ert and was told that I need to contact [phone] to provide further information. I attempted this and after receiving a msg that they are experiencing a high call volume a voice came on the phone and said she couldn't hear me and she hung up. I will wait until later to try this again. CALVIN ALVAREZ    4/4/19  11:36am.  Called [facility] ([phone]) and initiated the retroactive precert using CPT codes below. ICD 10 used was I25.10, R07.9, R06.02. Rec'd an automated msg that it needs to go to further review and they will call our office if any further information is needed.  CALVIN ALVAREZ      ----- Message from Josselin Fernandez LPN sent at 4/1/2019  3:12 PM EDT  -----  [facility] done 3-27-19. CPT codes 97311 and 89513 both requiring auth. Per [employer]. Per Harvey Alberto at [facility].

## 2019-04-07 LAB
BH CV ECHO MEAS - BSA(HAYCOCK): 2 M^2
BH CV ECHO MEAS - BSA: 1.9 M^2
BH CV ECHO MEAS - BZI_BMI: 32.1 KILOGRAMS/M^2
BH CV ECHO MEAS - BZI_METRIC_HEIGHT: 162.6 CM
BH CV ECHO MEAS - BZI_METRIC_WEIGHT: 84.8 KG
BH CV UPPER DUPLEX RIGHT RADIAL ART EDV: 0 CM/S
BH CV UPPER DUPLEX RIGHT RADIAL ART PSV: 119 CM/S
BH CV UPPER DUPLEX RIGHT ULNAR ART EDV: 8 CM/S
BH CV UPPER DUPLEX RIGHT ULNAR ART PSV: 86 CM/S
Lab: 0.9
Lab: 1

## 2019-05-08 ENCOUNTER — OFFICE VISIT (OUTPATIENT)
Dept: CARDIOLOGY | Facility: CLINIC | Age: 69
End: 2019-05-08

## 2019-05-08 VITALS
SYSTOLIC BLOOD PRESSURE: 147 MMHG | HEIGHT: 64 IN | WEIGHT: 180.8 LBS | OXYGEN SATURATION: 95 % | HEART RATE: 96 BPM | DIASTOLIC BLOOD PRESSURE: 79 MMHG | BODY MASS INDEX: 30.87 KG/M2

## 2019-05-08 DIAGNOSIS — F17.200 SMOKING: ICD-10-CM

## 2019-05-08 DIAGNOSIS — I10 ESSENTIAL HYPERTENSION: ICD-10-CM

## 2019-05-08 DIAGNOSIS — R06.02 SHORTNESS OF BREATH: ICD-10-CM

## 2019-05-08 DIAGNOSIS — R09.89 DECREASED PEDAL PULSES: ICD-10-CM

## 2019-05-08 DIAGNOSIS — I73.9 CLAUDICATION (HCC): ICD-10-CM

## 2019-05-08 DIAGNOSIS — R94.31 ABNORMAL EKG: Primary | ICD-10-CM

## 2019-05-08 DIAGNOSIS — R01.1 SYSTOLIC EJECTION MURMUR: ICD-10-CM

## 2019-05-08 DIAGNOSIS — I73.9 PAD (PERIPHERAL ARTERY DISEASE) (HCC): ICD-10-CM

## 2019-05-08 DIAGNOSIS — R07.2 PRECORDIAL PAIN: ICD-10-CM

## 2019-05-08 DIAGNOSIS — Z98.890 S/P CARDIAC CATH: ICD-10-CM

## 2019-05-08 DIAGNOSIS — R09.89 BILATERAL CAROTID BRUITS: ICD-10-CM

## 2019-05-08 DIAGNOSIS — E78.2 MIXED HYPERLIPIDEMIA: ICD-10-CM

## 2019-05-08 DIAGNOSIS — I25.10 CORONARY ARTERY DISEASE INVOLVING NATIVE CORONARY ARTERY OF NATIVE HEART WITHOUT ANGINA PECTORIS: ICD-10-CM

## 2019-05-08 DIAGNOSIS — I70.1 RENAL ARTERY STENOSIS (HCC): ICD-10-CM

## 2019-05-08 DIAGNOSIS — I73.9 PVD (PERIPHERAL VASCULAR DISEASE) WITH CLAUDICATION (HCC): ICD-10-CM

## 2019-05-08 PROCEDURE — 99214 OFFICE O/P EST MOD 30 MIN: CPT | Performed by: INTERNAL MEDICINE

## 2019-05-08 RX ORDER — ATORVASTATIN CALCIUM 40 MG/1
40 TABLET, FILM COATED ORAL DAILY
Qty: 30 TABLET | Refills: 11 | Status: SHIPPED | OUTPATIENT
Start: 2019-05-08 | End: 2020-05-04

## 2019-05-08 RX ORDER — CARVEDILOL 25 MG/1
25 TABLET ORAL 3 TIMES DAILY
Qty: 90 TABLET | Refills: 11 | Status: SHIPPED | OUTPATIENT
Start: 2019-05-08 | End: 2020-10-05

## 2019-05-08 RX ORDER — BUPROPION HYDROCHLORIDE 150 MG/1
TABLET, EXTENDED RELEASE ORAL
Refills: 0 | COMMUNITY
Start: 2019-04-03 | End: 2019-09-04 | Stop reason: ALTCHOICE

## 2019-05-08 NOTE — PATIENT INSTRUCTIONS
Heart-Healthy Eating Plan  Many factors influence your heart health, including eating and exercise habits. Heart (coronary) risk increases with abnormal blood fat (lipid) levels. Heart-healthy meal planning includes limiting unhealthy fats, increasing healthy fats, and making other small dietary changes. This includes maintaining a healthy body weight to help keep lipid levels within a normal range.  What is my plan?  Your health care provider recommends that you:  · Get no more than _________% of the total calories in your daily diet from fat.  · Limit your intake of saturated fat to less than _________% of your total calories each day.  · Limit the amount of cholesterol in your diet to less than _________ mg per day.    What types of fat should I choose?  · Choose healthy fats more often. Choose monounsaturated and polyunsaturated fats, such as olive oil and canola oil, flaxseeds, walnuts, almonds, and seeds.  · Eat more omega-3 fats. Good choices include salmon, mackerel, sardines, tuna, flaxseed oil, and ground flaxseeds. Aim to eat fish at least two times each week.  · Limit saturated fats. Saturated fats are primarily found in animal products, such as meats, butter, and cream. Plant sources of saturated fats include palm oil, palm kernel oil, and coconut oil.  · Avoid foods with partially hydrogenated oils in them. These contain trans fats. Examples of foods that contain trans fats are stick margarine, some tub margarines, cookies, crackers, and other baked goods.  What general guidelines do I need to follow?  · Check food labels carefully to identify foods with trans fats or high amounts of saturated fat.  · Fill one half of your plate with vegetables and green salads. Eat 4-5 servings of vegetables per day. A serving of vegetables equals 1 cup of raw leafy vegetables, ½ cup of raw or cooked cut-up vegetables, or ½ cup of vegetable juice.  · Fill one fourth of your plate with whole grains. Look for the word  "\"whole\" as the first word in the ingredient list.  · Fill one fourth of your plate with lean protein foods.  · Eat 4-5 servings of fruit per day. A serving of fruit equals one medium whole fruit, ¼ cup of dried fruit, ½ cup of fresh, frozen, or canned fruit, or ½ cup of 100% fruit juice.  · Eat more foods that contain soluble fiber. Examples of foods that contain this type of fiber are apples, broccoli, carrots, beans, peas, and barley. Aim to get 20-30 g of fiber per day.  · Eat more home-cooked food and less restaurant, buffet, and fast food.  · Limit or avoid alcohol.  · Limit foods that are high in starch and sugar.  · Avoid fried foods.  · Cook foods by using methods other than frying. Baking, boiling, grilling, and broiling are all great options. Other fat-reducing suggestions include:  ? Removing the skin from poultry.  ? Removing all visible fats from meats.  ? Skimming the fat off of stews, soups, and gravies before serving them.  ? Steaming vegetables in water or broth.  · Lose weight if you are overweight. Losing just 5-10% of your initial body weight can help your overall health and prevent diseases such as diabetes and heart disease.  · Increase your consumption of nuts, legumes, and seeds to 4-5 servings per week. One serving of dried beans or legumes equals ½ cup after being cooked, one serving of nuts equals 1½ ounces, and one serving of seeds equals ½ ounce or 1 tablespoon.  · You may need to monitor your salt (sodium) intake, especially if you have high blood pressure. Talk with your health care provider or dietitian to get more information about reducing sodium.  What foods can I eat?  Grains    Breads, including Kyrgyz, white, jay, wheat, raisin, rye, oatmeal, and Italian. Tortillas that are neither fried nor made with lard or trans fat. Low-fat rolls, including hotdog and hamburger buns and English muffins. Biscuits. Muffins. Waffles. Pancakes. Light popcorn. Whole-grain cereals. Flatbread. " Jessica toast. Pretzels. Breadsticks. Rusks. Low-fat snacks and crackers, including oyster, saltine, matzo, ritu, animal, and rye. Rice and pasta, including brown rice and those that are made with whole wheat.  Vegetables  All vegetables.  Fruits  All fruits, but limit coconut.  Meats and Other Protein Sources  Lean, well-trimmed beef, veal, pork, and lamb. Chicken and turkey without skin. All fish and shellfish. Wild duck, rabbit, pheasant, and venison. Egg whites or low-cholesterol egg substitutes. Dried beans, peas, lentils, and tofu. Seeds and most nuts.  Dairy  Low-fat or nonfat cheeses, including ricotta, string, and mozzarella. Skim or 1% milk that is liquid, powdered, or evaporated. Buttermilk that is made with low-fat milk. Nonfat or low-fat yogurt.  Beverages  Mineral water. Diet carbonated beverages.  Sweets and Desserts  Sherbets and fruit ices. Honey, jam, marmalade, jelly, and syrups. Meringues and gelatins. Pure sugar candy, such as hard candy, jelly beans, gumdrops, mints, marshmallows, and small amounts of dark chocolate. Brian food cake.  Eat all sweets and desserts in moderation.  Fats and Oils  Nonhydrogenated (trans-free) margarines. Vegetable oils, including soybean, sesame, sunflower, olive, peanut, safflower, corn, canola, and cottonseed. Salad dressings or mayonnaise that are made with a vegetable oil. Limit added fats and oils that you use for cooking, baking, salads, and as spreads.  Other  Cocoa powder. Coffee and tea. All seasonings and condiments.  The items listed above may not be a complete list of recommended foods or beverages. Contact your dietitian for more options.  What foods are not recommended?  Grains  Breads that are made with saturated or trans fats, oils, or whole milk. Croissants. Butter rolls. Cheese breads. Sweet rolls. Donuts. Buttered popcorn. Chow mein noodles. High-fat crackers, such as cheese or butter crackers.  Meats and Other Protein Sources  Fatty meats, such  as hotdogs, short ribs, sausage, spareribs, smith, ribeye roast or steak, and mutton. High-fat deli meats, such as salami and bologna. Caviar. Domestic duck and goose. Organ meats, such as kidney, liver, sweetbreads, brains, gizzard, chitterlings, and heart.  Dairy  Cream, sour cream, cream cheese, and creamed cottage cheese. Whole milk cheeses, including blue (demetrio), [place] , Brie, Garcia, American, Hav[place]i, Swiss, cheddar, Camembert, and [place]. Whole or 2% milk that is liquid, evaporated, or condensed. Whole buttermilk. Cream sauce or high-fat cheese sauce. Yogurt that is made from whole milk.  Beverages  Regular sodas and drinks with added sugar.  Sweets and Desserts  Frosting. Pudding. Cookies. Cakes other than kevin food cake. Candy that has milk chocolate or white chocolate, hydrogenated fat, butter, coconut, or unknown ingredients. Buttered syrups. Full-fat ice cream or ice cream drinks.  Fats and Oils  Gravy that has suet, meat fat, or shortening. Cocoa butter, hydrogenated oils, palm oil, coconut oil, palm kernel oil. These can often be found in baked products, candy, fried foods, nondairy creamers, and whipped toppings. Solid fats and shortenings, including smith fat, salt pork, lard, and butter. Nondairy cream substitutes, such as coffee creamers and sour cream substitutes. Salad dressings that are made of unknown oils, cheese, or sour cream.  The items listed above may not be a complete list of foods and beverages to avoid. Contact your dietitian for more information.  This information is not intended to replace advice given to you by your health care provider. Make sure you discuss any questions you have with your health care provider.  Document Released: 09/26/2009 Document Revised: 07/07/2017 Document Reviewed: 06/11/2015  [employer] Interactive Patient Education © 2019 [employer]  For more information:    Quit Now [place]  1-800-QUIT-NOW  https://hugoy.quitlogix.org/en-[place]/  Steps to Quit  Smoking  Smoking tobacco can be harmful to your health and can affect almost every organ in your body. Smoking puts you, and those around you, at risk for developing many serious chronic diseases. Quitting smoking is difficult, but it is one of the best things that you can do for your health. It is never too late to quit.  What are the benefits of quitting smoking?  When you quit smoking, you lower your risk of developing serious diseases and conditions, such as:  · Lung cancer or lung disease, such as COPD.  · Heart disease.  · Stroke.  · Heart attack.  · Infertility.  · Osteoporosis and bone fractures.  Additionally, symptoms such as coughing, wheezing, and shortness of breath may get better when you quit. You may also find that you get sick less often because your body is stronger at fighting off colds and infections. If you are pregnant, quitting smoking can help to reduce your chances of having a baby of low birth weight.  How do I get ready to quit?  When you decide to quit smoking, create a plan to make sure that you are successful. Before you quit:  · Pick a date to quit. Set a date within the next two weeks to give you time to prepare.  · Write down the reasons why you are quitting. Keep this list in places where you will see it often, such as on your bathroom mirror or in your car or wallet.  · Identify the people, places, things, and activities that make you want to smoke (triggers) and avoid them. Make sure to take these actions:  ¨ Throw away all cigarettes at home, at work, and in your car.  ¨ Throw away smoking accessories, such as ashtrays and lighters.  ¨ Clean your car and make sure to empty the ashtray.  ¨ Clean your home, including curtains and carpets.  · Tell your family, friends, and coworkers that you are quitting. Support from your loved ones can make quitting easier.  · Talk with your health care provider about your options for quitting smoking.  · Find out what treatment options are  covered by your health insurance.  What strategies can I use to quit smoking?  Talk with your healthcare provider about different strategies to quit smoking. Some strategies include:  · Quitting smoking altogether instead of gradually lessening how much you smoke over a period of time. Research shows that quitting “cold turkey” is more successful than gradually quitting.  · Attending in-person counseling to help you build problem-solving skills. You are more likely to have success in quitting if you attend several counseling sessions. Even short sessions of 10 minutes can be effective.  · Finding resources and support systems that can help you to quit smoking and remain smoke-free after you quit. These resources are most helpful when you use them often. They can include:  ¨ Online chats with a counselor.  ¨ Telephone quitlines.  ¨ Printed self-help materials.  ¨ Support groups or group counseling.  ¨ Text messaging programs.  ¨ Mobile phone applications.  · Taking medicines to help you quit smoking. (If you are pregnant or breastfeeding, talk with your health care provider first.) Some medicines contain nicotine and some do not. Both types of medicines help with cravings, but the medicines that include nicotine help to relieve withdrawal symptoms. Your health care provider may recommend:  ¨ Nicotine patches, gum, or lozenges.  ¨ Nicotine inhalers or sprays.  ¨ Non-nicotine medicine that is taken by mouth.  Talk with your health care provider about combining strategies, such as taking medicines while you are also receiving in-person counseling. Using these two strategies together makes you more likely to succeed in quitting than if you used either strategy on its own.  If you are pregnant or breastfeeding, talk with your health care provider about finding counseling or other support strategies to quit smoking. Do not take medicine to help you quit smoking unless told to do so by your health care provider.  What  things can I do to make it easier to quit?  Quitting smoking might feel overwhelming at first, but there is a lot that you can do to make it easier. Take these important actions:  · Reach out to your family and friends and ask that they support and encourage you during this time. Call telephone quitlines, reach out to support groups, or work with a counselor for support.  · Ask people who smoke to avoid smoking around you.  · Avoid places that trigger you to smoke, such as bars, parties, or smoke-break areas at work.  · Spend time around people who do not smoke.  · Lessen stress in your life, because stress can be a smoking trigger for some people. To lessen stress, try:  ¨ Exercising regularly.  ¨ Deep-breathing exercises.  ¨ Yoga.  ¨ Meditating.  ¨ Performing a body scan. This involves closing your eyes, scanning your body from head to toe, and noticing which parts of your body are particularly tense. Purposefully relax the muscles in those areas.  · Download or purchase mobile phone or tablet apps (applications) that can help you stick to your quit plan by providing reminders, tips, and encouragement. There are many free apps, such as QuitGuide from the CDC (Centers for Disease Control and Prevention). You can find other support for quitting smoking (smoking cessation) through smokefree.gov and other websites.  How will I feel when I quit smoking?  Within the first 24 hours of quitting smoking, you may start to feel some withdrawal symptoms. These symptoms are usually most noticeable 2-3 days after quitting, but they usually do not last beyond 2-3 weeks. Changes or symptoms that you might experience include:  · Mood swings.  · Restlessness, anxiety, or irritation.  · Difficulty concentrating.  · Dizziness.  · Strong cravings for sugary foods in addition to nicotine.  · Mild weight gain.  · Constipation.  · Nausea.  · Coughing or a sore throat.  · Changes in how your medicines work in your body.  · A depressed  mood.  · Difficulty sleeping (insomnia).  After the first 2-3 weeks of quitting, you may start to notice more positive results, such as:  · Improved sense of smell and taste.  · Decreased coughing and sore throat.  · Slower heart rate.  · Lower blood pressure.  · Clearer skin.  · The ability to breathe more easily.  · Fewer sick days.  Quitting smoking is very challenging for most people. Do not get discouraged if you are not successful the first time. Some people need to make many attempts to quit before they achieve long-term success. Do your best to stick to your quit plan, and talk with your health care provider if you have any questions or concerns.  This information is not intended to replace advice given to you by your health care provider. Make sure you discuss any questions you have with your health care provider.  Document Released: 12/12/2002 Document Revised: 08/15/2017 Document Reviewed: 05/03/2016  [employer] Interactive Patient Education © 2017 [employer]

## 2019-05-08 NOTE — PROGRESS NOTES
Problem List:    1.  Coronary artery disease  1.1 history of multiple stenting procedures in 2004 and  2016, inadequate data  1.2 stress test April 2018 demonstrates no evidence of ischemia and preserved LV function  1.31.  Stress test Scintigraphy demonstrates ischemia involving the inferoseptal segment, the basilar inferolateral segment, and the entire inferior wall.  1.4 cardiac catheterization March 2019 demonstrated chronic total occlusion of the RCA.  Nonobstructive LAD with high-grade stenosis involving the circumflex and obtuse marginal.  Angioplasty and stenting of the LAD and circumflex and stenting of the obtuse marginal.  Medical management recommended in recanalization of the  if symptoms did not improve  2.  Peripheral vascular disease  2.1 history of iliac stenting bilaterally    2.2 follow-up femorofemoral bypass by Dr. Dupont at [facility] in [place]  2.2 follow-up CT angiogram May 2018 demonstrates occluded iliacs with femorofemoral bypass noted but no discussion on report in regards to patency or disease  2.3 intermittent claudication  2.4 questionable left renal artery stenosis  2.5 right renal artery stenting June 2018 by Dr. Mcallister and Dr. Dupont with no significant disease of the lower extremities identified  3.  Labile hypertension  4.  Infrarenal abdominal aortic aneurysm measuring 3 cm by CT scan May 2018  5.  Type 2 diabetes mellitus  6.  Dyslipidemia  7.  Chronic tobacco use  8.  Carotid artery stenosis  8.1 carotid duplex March 2019 demonstrates 50% left internal carotid artery stenosis on the left and less than 50% on the right with antegrade vertebral flow      Subjective   Lyndsay Franco is a 68 y.o. female     Chief Complaint   Patient presents with   • Chest Pain     here for 1 month f/u   • Palpitations   • Hypertension   • Shortness of Breath   • Claudication   • Coronary Artery Disease       HPI  Ms. Franco returns for follow-up on coronary artery disease and cardiac  "risk factor modification.    She has been angina free since stenting of the circumflex until last weekend.  She was out dancing at a bar \"doin the [employer]\" and developed chest discomfort.  He ultimately took 3 nitroglycerin before the pain subsided.  She has not had any symptoms since, and she has had no change in her physical capacity.  Ms. Franco continues to be without orthopnea, PND, significant lower extremity edema, palpitations, dizziness, presyncope or syncope.  Since her bilateral lower extremity bypass surgery she has had no claudication.  She continues to smoke cigarettes but would like to try Chantix again is at seem to help her the most.  Chantix was not continued because of cost constraints.                Outpatient Encounter Medications as of 5/8/2019   Medication Sig Dispense Refill   • ALPRAZolam (XANAX) 0.5 MG tablet Take 0.5 mg by mouth 2 (Two) Times a Day As Needed.     • amLODIPine (NORVASC) 5 MG tablet Take 1 tablet by mouth 2 (Two) Times a Day. 60 tablet 4   • aspirin 81 MG tablet Take 1 tablet by mouth Daily. 30 tablet 11   • buPROPion SR (WELLBUTRIN SR) 150 MG 12 hr tablet TAKE 1 TABLET EACH MORNING FOR 3 DAYS,THEN TWICE DAILY  0   • busPIRone (BUSPAR) 10 MG tablet Take 1 tablet by mouth 3 (Three) Times a Day. 30 tablet 1   • cetirizine (zyrTEC) 10 MG tablet Take 10 mg by mouth Daily.     • CloNIDine (CATAPRES) 0.1 MG tablet Take 1 tablet by mouth 3 (Three) Times a Day. 90 tablet 4   • clopidogrel (PLAVIX) 75 MG tablet Take 75 mg by mouth Daily.     • HYDROcodone-acetaminophen (NORCO)  MG per tablet prn     • isosorbide mononitrate (IMDUR) 30 MG 24 hr tablet Take 1 tablet by mouth Every Morning. 30 tablet 11   • losartan-hydrochlorothiazide (HYZAAR) 100-25 MG per tablet Take 1 tablet by mouth Daily. 30 tablet 4   • meloxicam (MOBIC) 15 MG tablet TAKE 1 TABLET EVERY DAY 30 tablet 6   • metFORMIN (GLUCOPHAGE) 500 MG tablet 2 (Two) Times a Day.     • montelukast (SINGULAIR) 10 MG " tablet Take 10 mg by mouth Every Night.     • nitroglycerin (NITROSTAT) 0.4 MG SL tablet As Needed.     • pantoprazole (PROTONIX) 40 MG EC tablet Daily.     • senna (SENOKOT) 8.6 MG tablet tablet Take  by mouth Every Night.     • traZODone (DESYREL) 50 MG tablet Take 50 mg by mouth Every Night.     • [DISCONTINUED] metoprolol succinate XL (TOPROL-XL) 100 MG 24 hr tablet Take 1 tablet by mouth Daily. 90 tablet 3   • [DISCONTINUED] pravastatin (PRAVACHOL) 20 MG tablet Take 20 mg by mouth Every Night.       No facility-administered encounter medications on file as of 5/8/2019.        Patient has no known allergies.    Past Medical History:   Diagnosis Date   • Abnormal EKG 3/6/2019   • Anxiety    • Arthritis    • Coronary artery disease    • Depression    • Diabetes mellitus (CMS/HCC)    • GERD (gastroesophageal reflux disease)    • Hiatal hernia    • Hyperlipidemia    • Hypertension    • Myocardial infarction (CMS/HCC)    • Non-functioning kidney     Left    • Peripheral vascular disease (CMS/HCC)    • Renal artery aneurysm (CMS/HCC)    • Systolic ejection murmur 3/27/2018       Social History     Socioeconomic History   • Marital status:      Spouse name: Not on file   • Number of children: 3   • Years of education: Not on file   • Highest education level: Not on file   Occupational History   • Occupation: [employer]     Comment: Disabled-PVD   Tobacco Use   • Smoking status: Current Every Day Smoker     Packs/day: 0.75     Years: 40.00     Pack years: 30.00     Types: Cigarettes, Electronic Cigarette   • Smokeless tobacco: Never Used   Substance and Sexual Activity   • Alcohol use: No   • Drug use: No   • Sexual activity: Defer   Social History Narrative    Lives in [facility].        Family History   Adopted: Yes   Problem Relation Age of Onset   • No Known Problems Mother    • No Known Problems Father        Review of Systems   Constitutional: Positive for fatigue.   HENT:  "Negative.    Eyes: Negative.    Respiratory: Positive for chest tightness and shortness of breath ( with daily activity).    Cardiovascular: Positive for chest pain (one episode Friday), palpitations (racing) and leg swelling (BLE).   Gastrointestinal: Negative.    Endocrine: Negative.    Genitourinary: Negative.    Musculoskeletal: Positive for arthralgias, back pain, myalgias and neck pain.   Skin: Negative.    Allergic/Immunologic: Positive for environmental allergies.   Neurological: Positive for numbness (tingling in left arm).   Hematological: Bruises/bleeds easily.   Psychiatric/Behavioral: Positive for agitation. The patient is nervous/anxious.        Objective   Vitals:    05/08/19 1427   BP: 147/79   BP Location: Left arm   Patient Position: Sitting   Pulse: 96   SpO2: 95%   Weight: 82 kg (180 lb 12.8 oz)   Height: 162.6 cm (64.02\")      /79 (BP Location: Left arm, Patient Position: Sitting)   Pulse 96   Ht 162.6 cm (64.02\")   Wt 82 kg (180 lb 12.8 oz)   SpO2 95%   BMI 31.02 kg/m²     Lab Results (most recent)     None          Physical Exam   Constitutional: She is oriented to person, place, and time. She appears well-developed and well-nourished. No distress.   HENT:   Head: Normocephalic and atraumatic.   Eyes: Conjunctivae and EOM are normal. Pupils are equal, round, and reactive to light.   Neck: Normal range of motion. Neck supple. No hepatojugular reflux and no JVD present. Carotid bruit is present (left bruit). No tracheal deviation present.   Nl. Carotid upstrokes   Cardiovascular: Normal rate, regular rhythm, S1 normal, S2 normal and intact distal pulses. Exam reveals gallop and S4. Exam reveals no S3 and no friction rub.   No murmur heard.  Pulmonary/Chest: Effort normal and breath sounds normal. She has no wheezes. She has no rhonchi. She has no rales.   Nl. Expir. Phase  Nl. Breath sound intensity   Abdominal: Soft. Bowel sounds are normal. She exhibits no distension, no abdominal " bruit and no mass. There is no tenderness. There is no rebound and no guarding.   No organomegaly   Musculoskeletal: Normal range of motion. She exhibits no edema, tenderness or deformity.   BLE, palpable pedal pulses, nl. Cap. Refill, no edema     Neurological: She is alert and oriented to person, place, and time.   Skin: Skin is warm and dry. No rash noted. No erythema. No pallor.   Psychiatric: She has a normal mood and affect. Her behavior is normal. Judgment and thought content normal.   Nursing note and vitals reviewed.      Procedure   Procedures       Assessment/Plan   #1.  Coronary artery disease.  The patient is status post stenting of the circumflex and his chronic total occlusion of the right coronary artery within the previously stented segment.  Although the patient is not on maximal antianginal therapy, I think that the recurrence of angina at only moderate levels of physical activity warrant consideration for mechanical revascularization of the  of the right coronary artery.  In that regard, we will get copies of the patient's most recent cath films and forward them to Dr. Francis in [place] for consideration for treatment.    2.  I would like to change metoprolol to carvedilol 25 mg 3 times daily to improve blood pressure control and possibly to increase the patient's anginal threshold.    3.  The patient continues to be symptomatic we can consider adding long-acting nitrates, Ranexa, and/or increasing amlodipine for better control.    4.  I would also like to change pravastatin to atorvastatin 40 mg daily to address dyslipidemia in a patient with known coronary and peripheral arterial disease.    5.  We will attempt to preop the patient for Chantix to lower her out-of-pocket cost.    6.  Ms. Franco will follow up with Dr. Otoole as instructed, and in our office in 6 to 8 weeks or on a as needed basis  Problems Addressed this Visit        Cardiovascular and Mediastinum    Coronary artery disease  involving native coronary artery of native heart without angina pectoris    Essential hypertension    Mixed hyperlipidemia    PAD (peripheral artery disease) (CMS/HCC)    Decreased pedal pulses    Bilateral carotid bruits    Systolic ejection murmur    Renal artery stenosis (CMS/HCC)    PVD (peripheral vascular disease) with claudication (CMS/HCC)    Abnormal EKG - Primary       Respiratory    Shortness of breath       Nervous and Auditory    Chest pain    Claudication (CMS/HCC)       Other    S/P cardiac cath      Other Visit Diagnoses     Smoking                         I advised Lyndsay of the risks of continuing to use tobacco, and I provided her with tobacco cessation educational materials in the After Visit Summary.     During this visit, I spent <3 minutes counseling the patient regarding tobacco cessation.     Patient's Body mass index is 31.02 kg/m². BMI is above normal parameters. Recommendations include: educational material and referral to primary care.       Josselin Fernandez LPN    Scribed for Dr. Quang Suggs by Josselin Fernandez LPN May 8, 2019 3:16 PM           Electronically signed by:

## 2019-05-20 ENCOUNTER — TELEPHONE (OUTPATIENT)
Dept: CARDIOLOGY | Facility: CLINIC | Age: 69
End: 2019-05-20

## 2019-05-20 NOTE — TELEPHONE ENCOUNTER
Patient reports episodes presyncope with diaphoresis x3 she reports medication carvedilol added 5/8/19.  She was asking about referral to Dr. Otoole.  I spoke with Pat and she updated me on status.  I made patient aware package being sent today with tests and information for him to evaluate.  RENATE,CMA

## 2019-05-22 ENCOUNTER — DOCUMENTATION (OUTPATIENT)
Dept: CARDIOLOGY | Facility: CLINIC | Age: 69
End: 2019-05-22

## 2019-05-22 RX ORDER — BUSPIRONE HYDROCHLORIDE 10 MG/1
10 TABLET ORAL 3 TIMES DAILY
Qty: 30 TABLET | Refills: 1 | Status: SHIPPED | OUTPATIENT
Start: 2019-05-22 | End: 2019-07-02 | Stop reason: SDUPTHER

## 2019-05-22 NOTE — PROGRESS NOTES
COPY OF [facility] FILM, CATH REPORT, LAST OFFICE NOTE, LAST STRESS TEST AND ECHO ALL PLACED IN FED EX GROUND PACKING FOLDER AND SCHEDULED FOR PICK-UP MARLYS. PICK-UP # [id number]. TO BE SENT TO DR. BECKETT. PH,LPN

## 2019-06-19 ENCOUNTER — RESULTS ENCOUNTER (OUTPATIENT)
Dept: CARDIOLOGY | Facility: CLINIC | Age: 69
End: 2019-06-19

## 2019-06-19 DIAGNOSIS — E78.2 MIXED HYPERLIPIDEMIA: ICD-10-CM

## 2019-07-03 RX ORDER — BUSPIRONE HYDROCHLORIDE 10 MG/1
10 TABLET ORAL 3 TIMES DAILY
Qty: 30 TABLET | Refills: 1 | Status: SHIPPED | OUTPATIENT
Start: 2019-07-03 | End: 2019-08-22

## 2019-07-11 ENCOUNTER — TELEPHONE (OUTPATIENT)
Dept: CARDIOLOGY | Facility: CLINIC | Age: 69
End: 2019-07-11

## 2019-07-11 NOTE — TELEPHONE ENCOUNTER
PATIENT STATES SHE HAS BEEN TAKING LIPITOR SINCE PRESCRIBED AND HER LEGS HAVE BEEN CRAMPING UP AND BONE PAIN. PER DR. ECHEVERRIA'S STANDARD RECOMMENDATION, TRY CO-ENZYME Q-10 100 MG PO BID AND DRINK 6 OZ. OF TONIC WATER EVERY EVENING. PATIENT TO TRY THIS FOR 1-1.5 WEEKS AND  CALL THE OFFICE BACK IF NO RELIEF. AWARE TO NOT STOP LIPITOR, TRY ABOVE RECOMMENDATIONS FIRST, ROXANNA. DUE TO HER CARDIAC HX. VERBALIZED SHE WOULD. PH,LPN

## 2019-08-20 DIAGNOSIS — M19.90 ARTHRITIS: ICD-10-CM

## 2019-08-20 RX ORDER — MELOXICAM 15 MG/1
TABLET ORAL
Qty: 90 TABLET | Refills: 3 | Status: SHIPPED | OUTPATIENT
Start: 2019-08-20 | End: 2020-03-11

## 2019-08-22 ENCOUNTER — OFFICE VISIT (OUTPATIENT)
Dept: CARDIOLOGY | Facility: CLINIC | Age: 69
End: 2019-08-22

## 2019-08-22 VITALS
HEART RATE: 62 BPM | DIASTOLIC BLOOD PRESSURE: 73 MMHG | OXYGEN SATURATION: 96 % | HEIGHT: 64 IN | WEIGHT: 182 LBS | BODY MASS INDEX: 31.07 KG/M2 | SYSTOLIC BLOOD PRESSURE: 127 MMHG

## 2019-08-22 DIAGNOSIS — I73.9 PVD (PERIPHERAL VASCULAR DISEASE) WITH CLAUDICATION (HCC): ICD-10-CM

## 2019-08-22 DIAGNOSIS — E78.2 MIXED HYPERLIPIDEMIA: ICD-10-CM

## 2019-08-22 DIAGNOSIS — I73.9 CLAUDICATION (HCC): ICD-10-CM

## 2019-08-22 DIAGNOSIS — R06.02 SHORTNESS OF BREATH: ICD-10-CM

## 2019-08-22 DIAGNOSIS — R07.2 PRECORDIAL PAIN: ICD-10-CM

## 2019-08-22 DIAGNOSIS — R09.89 BILATERAL CAROTID BRUITS: ICD-10-CM

## 2019-08-22 DIAGNOSIS — I10 ESSENTIAL HYPERTENSION: ICD-10-CM

## 2019-08-22 DIAGNOSIS — R94.31 ABNORMAL EKG: ICD-10-CM

## 2019-08-22 DIAGNOSIS — R01.1 SYSTOLIC EJECTION MURMUR: ICD-10-CM

## 2019-08-22 DIAGNOSIS — I25.10 CORONARY ARTERY DISEASE INVOLVING NATIVE CORONARY ARTERY OF NATIVE HEART WITHOUT ANGINA PECTORIS: Primary | ICD-10-CM

## 2019-08-22 DIAGNOSIS — I73.9 PAD (PERIPHERAL ARTERY DISEASE) (HCC): ICD-10-CM

## 2019-08-22 PROCEDURE — 99214 OFFICE O/P EST MOD 30 MIN: CPT | Performed by: INTERNAL MEDICINE

## 2019-08-22 RX ORDER — DOXYCYCLINE 100 MG/1
CAPSULE ORAL 2 TIMES DAILY
Refills: 0 | COMMUNITY
Start: 2019-08-19 | End: 2019-09-04 | Stop reason: ALTCHOICE

## 2019-08-22 RX ORDER — CLONIDINE HYDROCHLORIDE 0.3 MG/1
0.3 TABLET ORAL 2 TIMES DAILY
Refills: 5 | COMMUNITY
Start: 2019-08-14 | End: 2019-11-06

## 2019-08-22 RX ORDER — FEXOFENADINE HCL 180 MG/1
TABLET ORAL DAILY
Refills: 2 | COMMUNITY
Start: 2019-05-21 | End: 2020-05-19

## 2019-08-22 RX ORDER — ESCITALOPRAM OXALATE 10 MG/1
TABLET ORAL DAILY PRN
Refills: 5 | COMMUNITY
Start: 2019-08-20 | End: 2020-07-19

## 2019-08-22 RX ORDER — ISOSORBIDE MONONITRATE 60 MG/1
TABLET, EXTENDED RELEASE ORAL DAILY
Refills: 0 | COMMUNITY
Start: 2019-08-19 | End: 2019-09-04 | Stop reason: SDUPTHER

## 2019-08-22 RX ORDER — MELATONIN
DAILY
Refills: 3 | COMMUNITY
Start: 2019-08-02 | End: 2019-11-06

## 2019-08-22 RX ORDER — VARENICLINE TARTRATE 1 MG/1
TABLET, FILM COATED ORAL
Refills: 2 | COMMUNITY
Start: 2019-08-05 | End: 2019-09-04 | Stop reason: SDUPTHER

## 2019-08-22 RX ORDER — POTASSIUM CHLORIDE 750 MG/1
10 TABLET, EXTENDED RELEASE ORAL DAILY
COMMUNITY
Start: 2019-07-24 | End: 2020-07-19

## 2019-08-22 NOTE — PATIENT INSTRUCTIONS
Obesity, Adult  Obesity is the condition of having too much total body fat. Being overweight or obese means that your weight is greater than what is considered healthy for your body size. Obesity is determined by a measurement called BMI. BMI is an estimate of body fat and is calculated from height and weight. For adults, a BMI of 30 or higher is considered obese.  Obesity can eventually lead to other health concerns and major illnesses, including:  · Stroke.  · Coronary artery disease (CAD).  · Type 2 diabetes.  · Some types of cancer, including cancers of the colon, breast, uterus, and gallbladder.  · Osteoarthritis.  · High blood pressure (hypertension).  · High cholesterol.  · Sleep apnea.  · Gallbladder stones.  · Infertility problems.  What are the causes?  The main cause of obesity is taking in (consuming) more calories than your body uses for energy. Other factors that contribute to this condition may include:  · Being born with genes that make you more likely to become obese.  · Having a medical condition that causes obesity. These conditions include:  ? Hypothyroidism.  ? Polycystic ovarian syndrome (PCOS).  ? Binge-eating disorder.  ? Cushing syndrome.  · Taking certain medicines, such as steroids, antidepressants, and seizure medicines.  · Not being physically active (sedentary lifestyle).  · Living where there are limited places to exercise safely or buy healthy foods.  · Not getting enough sleep.  What increases the risk?  The following factors may increase your risk of this condition:  · Having a family history of obesity.  · Being a woman of -American descent.  · Being a man of  descent.  What are the signs or symptoms?  Having excessive body fat is the main symptom of this condition.  How is this diagnosed?  This condition may be diagnosed based on:  · Your symptoms.  · Your medical history.  · A physical exam. Your health care provider may measure:  ? Your BMI. If you are an adult  with a BMI between 25 and less than 30, you are considered overweight. If you are an adult with a BMI of 30 or higher, you are considered obese.  ? The distances around your hips and your waist (circumferences). These may be compared to each other to help diagnose your condition.  ? Your skinfold thickness. Your health care provider may gently pinch a fold of your skin and measure it.  How is this treated?  Treatment for this condition often includes changing your lifestyle. Treatment may include some or all of the following:  · Dietary changes. Work with your health care provider and a dietitian to set a weight-loss goal that is healthy and reasonable for you. Dietary changes may include eating:  ? Smaller portions. A portion size is the amount of a particular food that is healthy for you to eat at one time. This varies from person to person.  ? Low-calorie or low-fat options.  ? More whole grains, fruits, and vegetables.  · Regular physical activity. This may include aerobic activity (cardio) and strength training.  · Medicine to help you lose weight. Your health care provider may prescribe medicine if you are unable to lose 1 pound a week after 6 weeks of eating more healthily and doing more physical activity.  · Surgery. Surgical options may include gastric banding and gastric bypass. Surgery may be done if:  ? Other treatments have not helped to improve your condition.  ? You have a BMI of 40 or higher.  ? You have life-threatening health problems related to obesity.  Follow these instructions at home:    Eating and drinking    · Follow recommendations from your health care provider about what you eat and drink. Your health care provider may advise you to:  ? Limit fast foods, sweets, and processed snack foods.  ? Choose low-fat options, such as low-fat milk instead of whole milk.  ? Eat 5 or more servings of fruits or vegetables every day.  ? Eat at home more often. This gives you more control over what you  eat.  ? Choose healthy foods when you eat out.  ? Learn what a healthy portion size is.  ? Keep low-fat snacks on hand.  ? Avoid sugary drinks, such as soda, fruit juice, iced tea sweetened with sugar, and flavored milk.  ? Eat a healthy breakfast.  · Drink enough water to keep your urine clear or pale yellow.  · Do not go without eating for long periods of time (do not fast) or follow a fad diet. Fasting and fad diets can be unhealthy and even dangerous.  Physical Activity  · Exercise regularly, as told by your health care provider. Ask your health care provider what types of exercise are safe for you and how often you should exercise.  · Warm up and stretch before being active.  · Cool down and stretch after being active.  · Rest between periods of activity.  Lifestyle  · Limit the time that you spend in front of your TV, computer, or video game system.  · Find ways to reward yourself that do not involve food.  · Limit alcohol intake to no more than 1 drink a day for nonpregnant women and 2 drinks a day for men. One drink equals 12 oz of beer, 5 oz of wine, or 1½ oz of hard liquor.  General instructions  · Keep a weight loss journal to keep track of the food you eat and how much you exercise you get.  · Take over-the-counter and prescription medicines only as told by your health care provider.  · Take vitamins and supplements only as told by your health care provider.  · Consider joining a support group. Your health care provider may be able to recommend a support group.  · Keep all follow-up visits as told by your health care provider. This is important.  Contact a health care provider if:  · You are unable to meet your weight loss goal after 6 weeks of dietary and lifestyle changes.  This information is not intended to replace advice given to you by your health care provider. Make sure you discuss any questions you have with your health care provider.  Document Released: 01/25/2006 Document Revised: 05/22/2017  Document Reviewed: 10/05/2016  [employer] Interactive Patient Education © 2019 [employer]  MyPlate from [employer]    MyPlate is an outline of a general healthy diet based on the 2010 Dietary Guidelines for Americans, from the U.S. Department of Agriculture (USDA). It sets guidelines for how much food you should eat from each food group based on your age, sex, and level of physical activity.  What are tips for following MyPlate?  To follow MyPlate recommendations:  · Eat a wide variety of fruits and vegetables, grains, and protein foods.  · Serve smaller portions and eat less food throughout the day.  · Limit portion sizes to avoid overeating.  · Enjoy your food.  · Get at least 150 minutes of exercise every week. This is about 30 minutes each day, 5 or more days per week.  It can be difficult to have every meal look like MyPlate. Think about MyPlate as eating guidelines for an entire day, rather than each individual meal.  Fruits and vegetables  · Make half of your plate fruits and vegetables.  · Eat many different colors of fruits and vegetables each day.  · For a 2,000 calorie daily food plan, eat:  ? 2½ cups of vegetables every day.  ? 2 cups of fruit every day.  · 1 cup is equal to:  ? 1 cup raw or cooked vegetables.  ? 1 cup raw fruit.  ? 1 medium-sized orange, apple, or banana.  ? 1 cup 100% fruit or vegetable juice.  ? 2 cups raw leafy greens, such as lettuce, spinach, or kale.  ? ½ cup dried fruit.  Grains  · One fourth of your plate should be grains.  · Make at least half of the grains you eat each day whole grains.  · For a 2,000 calorie daily food plan, eat 6 oz of grains every day.  · 1 oz is equal to:  ? 1 slice bread.  ? 1 cup cereal.  ? ½ cup cooked rice, cereal, or pasta.  Protein  · One fourth of your plate should be protein.  · Eat a wide variety of protein foods, including meat, poultry, fish, eggs, beans, nuts, and tofu.  · For a 2,000 calorie daily food plan, eat 5½ oz of protein every day.  · 1 oz  is equal to:  ? 1 oz meat, poultry, or fish.  ? ¼ cup cooked beans.  ? 1 egg.  ? ½ oz nuts or seeds.  ? 1 Tbsp peanut butter.  Dairy  · Drink fat-free or low-fat (1%) milk.  · Eat or drink dairy as a side to meals.  · For a 2,000 calorie daily food plan, eat or drink 3 cups of dairy every day.  · 1 cup is equal to:  ? 1 cup milk, yogurt, cottage cheese, or soy milk (soy beverage).  ? 2 oz processed cheese.  ? 1½ oz natural cheese.  Fats, oils, salt, and sugars  · Only small amounts of oils are recommended.  · Avoid foods that are high in calories and low in nutritional value (empty calories), like foods high in fat or added sugars.  · Choose foods that are low in salt (sodium). Choose foods that have less than 140 milligrams (mg) of sodium per serving.  · Drink water instead of sugary drinks. Drink enough water each day to keep your urine pale yellow.  Where to find support  · Work with your health care provider or a nutrition specialist (dietitian) to develop a customized eating plan that is right for you.  · Download an bina (mobile application) to help you track your daily food intake.  Where to find more information  · Go to ChooseMyPlate.gov for more information.  · Learn more and log your daily food intake according to MyPlate using USDA's SuperTracker: www.supertracker.usda.gov  Summary  · MyPlate is a general guideline for healthy eating from the USDA. It is based on the 2010 Dietary Guidelines for Americans.  · In general, fruits and vegetables should take up ½ of your plate, grains should take up ¼ of your plate, and protein should take up ¼ of your plate.  This information is not intended to replace advice given to you by your health care provider. Make sure you discuss any questions you have with your health care provider.  Document Released: 01/06/2009 Document Revised: 03/19/2018 Document Reviewed: 03/19/2018  [employer] Interactive Patient Education © 2019 [employer]

## 2019-08-22 NOTE — PROGRESS NOTES
Subjective   Lyndsay Franco is a 69 y.o. female     Chief Complaint   Patient presents with   • Coronary Artery Disease     Here for PCI f/u with Dr. Kwong   • Hypertension   • Hyperlipidemia       PROBLEM LIST:       1.  Coronary artery disease  1.1 history of multiple stenting procedures in 2004 and  2016, inadequate data  1.2 stress test April 2018 demonstrates no evidence of ischemia and preserved LV function  1.31.  Stress test Scintigraphy demonstrates ischemia involving the inferoseptal segment, the basilar inferolateral segment, and the entire inferior wall.  1.4 cardiac catheterization March 2019 demonstrated chronic total occlusion of the RCA.  Nonobstructive LAD with high-grade stenosis involving the circumflex and obtuse marginal.  Angioplasty and stenting of the LAD and circumflex and stenting of the obtuse marginal.  Medical management recommended in recanalization of the  if symptoms did not improve  1.5 Successful  PCI RCA laser atherectomy and KRISTINE x 1 via LRA per Dr. Kwong at [facility] on 8-14-19  2.  Peripheral vascular disease  2.1 history of iliac stenting bilaterally    2.2 follow-up femorofemoral bypass by Dr. Dupont at [facility] in [place]  2.2 follow-up CT angiogram May 2018 demonstrates occluded iliacs with femorofemoral bypass noted but no discussion on report in regards to patency or disease  2.3 intermittent claudication  2.4 questionable left renal artery stenosis  2.5 right renal artery stenting June 2018 by Dr. Mcallister and Dr. Dupont with no significant disease of the lower extremities identified  3.  Labile hypertension  4.  Infrarenal abdominal aortic aneurysm measuring 3 cm by CT scan May 2018  5.  Type 2 diabetes mellitus  6.  Dyslipidemia  7.  Chronic tobacco use  8.  Carotid artery stenosis  8.1 carotid duplex March 2019 demonstrates 50% left internal carotid artery stenosis on the left and less than 50% on the right with antegrade vertebral  flow                    Specialty Problems        Cardiology Problems    Bilateral carotid bruits        Coronary artery disease involving native coronary artery of native heart without angina pectoris        Decreased pedal pulses        Essential hypertension        Mixed hyperlipidemia        PAD (peripheral artery disease) (CMS/HCC)        Systolic ejection murmur        Renal artery stenosis (CMS/HCC)        PVD (peripheral vascular disease) with claudication (CMS/HCC)        Abnormal EKG                HPI:  Ms. Franco returns for follow-up after successful PCI of the IntraStent  of the right coronary artery.    She is currently angina free.  She denies orthopnea, PND, or lower extremity edema.  She has rare palpitations that do not cause dizziness, presyncope, or syncope.  She has mild orthostatic symptomatology.  She describes no post-cath complications.  Ms. Franco describes no claudication or symptoms of arterial embolic events.    It appears that Ms. Franco underwent intensive counseling reference smoking cessation and she describes being cigarette free since the time of her intervention.  She has been compliant with medications.  Although blood pressures were initially elevated here the patient described that she had significant low back pain.  Repeat blood pressure was normal.                          CURRENT MEDICATION:    Current Outpatient Medications   Medication Sig Dispense Refill   • ALPRAZolam (XANAX) 0.5 MG tablet Take 0.5 mg by mouth 2 (Two) Times a Day As Needed.     • amLODIPine (NORVASC) 5 MG tablet Take 1 tablet by mouth 2 (Two) Times a Day. 60 tablet 4   • aspirin 81 MG tablet Take 81 mg by mouth 2 (Two) Times a Day. 30 tablet 11   • atorvastatin (LIPITOR) 40 MG tablet Take 1 tablet by mouth Daily. 30 tablet 11   • buPROPion SR (WELLBUTRIN SR) 150 MG 12 hr tablet TAKE 1 TABLET EACH MORNING FOR 3 DAYS,THEN TWICE DAILY  0   • carvedilol (COREG) 25 MG tablet Take 1 tablet by mouth 3  (Three) Times a Day. 90 tablet 11   • CHANTIX CONTINUING MONTH KARIE 1 MG tablet As prescribed  2   • cholecalciferol (VITAMIN D3) 1000 units tablet Daily.  3   • CloNIDine (CATAPRES) 0.3 MG tablet Every Night.  5   • clopidogrel (PLAVIX) 75 MG tablet Take 75 mg by mouth Daily.     • doxycycline (MONODOX) 100 MG capsule 2 (Two) Times a Day.  0   • escitalopram (LEXAPRO) 10 MG tablet Daily As Needed.  5   • fexofenadine (ALLEGRA) 180 MG tablet Daily.  2   • HYDROcodone-acetaminophen (NORCO)  MG per tablet prn     • isosorbide mononitrate (IMDUR) 60 MG 24 hr tablet Daily.  0   • losartan-hydrochlorothiazide (HYZAAR) 100-25 MG per tablet Take 1 tablet by mouth Daily. 30 tablet 4   • meloxicam (MOBIC) 15 MG tablet TAKE 1 TABLET EVERY DAY 90 tablet 3   • metFORMIN (GLUCOPHAGE) 500 MG tablet 2 (Two) Times a Day.     • montelukast (SINGULAIR) 10 MG tablet Take 10 mg by mouth Every Night.     • pantoprazole (PROTONIX) 40 MG EC tablet Daily.     • potassium chloride (K-DUR,KLOR-CON) 10 MEQ CR tablet Take 10 mEq by mouth Daily.     • senna (SENOKOT) 8.6 MG tablet tablet Take  by mouth Every Night.     • traZODone (DESYREL) 50 MG tablet Take 50 mg by mouth Every Night.     • busPIRone (BUSPAR) 10 MG tablet TAKE 1 TABLET BY MOUTH 3 (THREE) TIMES A DAY. 30 tablet 1   • nitroglycerin (NITROSTAT) 0.4 MG SL tablet As Needed.       No current facility-administered medications for this visit.        ALLERGIES:    Patient has no known allergies.    PAST MEDICAL HISTORY:    Past Medical History:   Diagnosis Date   • Abnormal EKG 3/6/2019   • Anxiety    • Arthritis    • Coronary artery disease    • Depression    • Diabetes mellitus (CMS/HCC)    • GERD (gastroesophageal reflux disease)    • Hiatal hernia    • Hyperlipidemia    • Hypertension    • Myocardial infarction (CMS/HCC)    • Non-functioning kidney     Left    • Peripheral vascular disease (CMS/HCC)    • Renal artery aneurysm (CMS/HCC)    • Systolic ejection murmur 3/27/2018        SURGICAL HISTORY:    Past Surgical History:   Procedure Laterality Date   • AORTAGRAM N/A 6/19/2018    Procedure: AORTAGRAM WITHOUT RUNOFFS WITH STENT, RIGHT FEM CUT DOWN;  Surgeon: Quang Mcallister MD;  Location: [place] [id number];  Service: Vascular   • BREAST CYST EXCISION     • CARDIAC CATHETERIZATION      3/2019 -- 2 stents    • CHOLECYSTECTOMY     • CORONARY STENT PLACEMENT     • FEMORAL FEMORAL BYPASS     • HEMORROIDECTOMY     • HYSTERECTOMY         SOCIAL HISTORY:    Social History     Socioeconomic History   • Marital status:      Spouse name: Not on file   • Number of children: 3   • Years of education: Not on file   • Highest education level: Not on file   Occupational History   • Occupation: [employer]     Comment: Disabled-PVD   Tobacco Use   • Smoking status: Former Smoker     Packs/day: 0.75     Years: 40.00     Pack years: 30.00     Types: Cigarettes, Electronic Cigarette   • Smokeless tobacco: Never Used   Substance and Sexual Activity   • Alcohol use: No   • Drug use: No   • Sexual activity: Defer   Social History Narrative    Lives in [facility].        FAMILY HISTORY:    Family History   Adopted: Yes   Problem Relation Age of Onset   • No Known Problems Mother    • No Known Problems Father        Review of Systems   Constitutional: Positive for fatigue (improving).   HENT: Negative.    Eyes: Positive for visual disturbance (glasses prn).   Respiratory: Positive for shortness of breath (improving).    Cardiovascular: Negative.    Gastrointestinal: Positive for constipation.   Endocrine: Negative.    Genitourinary: Negative.    Musculoskeletal: Positive for arthralgias and myalgias.   Skin: Negative.    Allergic/Immunologic: Positive for environmental allergies.   Neurological: Positive for light-headedness (occas.).   Hematological: Negative.    Psychiatric/Behavioral: Positive for sleep disturbance (up to BR).       VISIT VITALS:  Vitals:    08/22/19  "1005   BP: 163/70   BP Location: Left arm   Patient Position: Sitting   Pulse: 62   SpO2: 96%   Weight: 82.6 kg (182 lb)   Height: 162.6 cm (64.02\")      /70 (BP Location: Left arm, Patient Position: Sitting)   Pulse 62   Ht 162.6 cm (64.02\")   Wt 82.6 kg (182 lb)   SpO2 96%   BMI 31.22 kg/m²     RECENT LABS:    Objective       Physical Exam   Constitutional: She is oriented to person, place, and time. She appears well-developed and well-nourished. No distress.   HENT:   Head: Normocephalic and atraumatic.   Eyes: Conjunctivae and EOM are normal. Pupils are equal, round, and reactive to light.   Neck: Normal range of motion. Neck supple. No JVD present. Carotid bruit is present (bilat. bruits, louder on left than rt.). No tracheal deviation present.   Nl. Carotid upstrokes   Cardiovascular: Normal rate, regular rhythm, S1 normal, S2 normal and intact distal pulses. Exam reveals gallop and S4. Exam reveals no S3 and no friction rub.   No murmur heard.  No bruit or hematoma, ecchymosis, nl. Cap. refill s/p LHC lt. wrist     Pulmonary/Chest: Effort normal. She has no wheezes. She has rhonchi (mostly clears with cough) in the right lower field. She has no rales.   Few persistent crackles   Abdominal: Soft. Bowel sounds are normal. She exhibits no distension and no mass. There is no tenderness. There is no rebound and no guarding.   Musculoskeletal: Normal range of motion. She exhibits no edema, tenderness or deformity.   LLE, no edema, Nl. DP pedal pulses, nl.cap. Refill  RLE, no edema, rt. Foot colder than lt., unable to palpate pedal pulses, nl. Cap. refill   Neurological: She is alert and oriented to person, place, and time.   Skin: Skin is warm and dry. No rash noted. No erythema. No pallor.   Psychiatric: She has a normal mood and affect. Her behavior is normal. Judgment and thought content normal.   Nursing note and vitals reviewed.      Procedures      Assessment/Plan   #1 coronary artery disease " currently angina free status post  PCI in the right coronary artery.  Medications are appropriate, blood pressures are controlled, we will continue current therapy.    2.  Peripheral arterial disease.  The patient describes no true claudication to me today but states that Dr. Francis felt she had anatomic abnormalities in her previous lower extremity bypass.  We will continue close clinical monitoring with follow-up as needed.    3.  Patient also describes to me severe anemia prior to her lower extremity revascularization.  I would defer further work-up to Dr. Otoole in that regard.  The patient describes being prescribed iron but not having any evaluation for the cause of her anemia.    4.  I would like to see the patient in follow-up in 3 to 4 months or on a as needed basis for any recurrence of symptoms or other symptoms as described today.  Will follow with Dr. Otoole for his instructions.   Diagnosis Plan   1. Coronary artery disease involving native coronary artery of native heart without angina pectoris     2. Bilateral carotid bruits     3. Abnormal EKG     4. Essential hypertension     5. Mixed hyperlipidemia     6. PAD (peripheral artery disease) (CMS/HCC)     7. PVD (peripheral vascular disease) with claudication (CMS/HCC)     8. Systolic ejection murmur     9. Shortness of breath     10. Claudication (CMS/HCC)     11. Precordial pain         No Follow-up on file.                Patient's Body mass index is 31.22 kg/m². BMI is above normal parameters. Recommendations include: educational material and referral to primary care.       Josselin Fernandez LPN    Scribed for Dr. Quang Suggs by Josselin Fernandez LPN August 22, 2019 10:15 AM         Electronically signed by:            This note is dictated utilizing voice recognition software.  Although this record has been proof read, transcriptional errors may still be present. If questions occur regarding the content of this record please do not hesitate to  call our office.

## 2019-08-28 ENCOUNTER — TELEPHONE (OUTPATIENT)
Dept: CARDIOLOGY | Facility: CLINIC | Age: 69
End: 2019-08-28

## 2019-08-28 NOTE — TELEPHONE ENCOUNTER
Patient is calling to make sure we have labs from [facility] and to make provider aware BP is still not stable , she said sometimes  it runs High and other days it runs low.  She is not having lightheadedness  Today after seeing PCP she was dehydrated and low blood count she was instructed to drink a lot of H2O and repeat labs in 2 weeks.  She said she will continue to log her BP's and call if her BP's are not WNL's.  RENATE,ADELAIDA

## 2019-09-04 ENCOUNTER — OFFICE VISIT (OUTPATIENT)
Dept: CARDIOLOGY | Facility: CLINIC | Age: 69
End: 2019-09-04

## 2019-09-04 VITALS
WEIGHT: 181.8 LBS | HEART RATE: 75 BPM | DIASTOLIC BLOOD PRESSURE: 67 MMHG | BODY MASS INDEX: 31.04 KG/M2 | SYSTOLIC BLOOD PRESSURE: 128 MMHG | HEIGHT: 64 IN | OXYGEN SATURATION: 98 %

## 2019-09-04 DIAGNOSIS — E78.2 MIXED HYPERLIPIDEMIA: ICD-10-CM

## 2019-09-04 DIAGNOSIS — I25.10 CORONARY ARTERY DISEASE INVOLVING NATIVE CORONARY ARTERY OF NATIVE HEART WITHOUT ANGINA PECTORIS: Primary | ICD-10-CM

## 2019-09-04 DIAGNOSIS — R01.1 SYSTOLIC EJECTION MURMUR: ICD-10-CM

## 2019-09-04 DIAGNOSIS — I73.9 CLAUDICATION (HCC): ICD-10-CM

## 2019-09-04 DIAGNOSIS — I10 ESSENTIAL HYPERTENSION: ICD-10-CM

## 2019-09-04 DIAGNOSIS — F17.200 SMOKER: ICD-10-CM

## 2019-09-04 DIAGNOSIS — I73.9 PVD (PERIPHERAL VASCULAR DISEASE) WITH CLAUDICATION (HCC): ICD-10-CM

## 2019-09-04 DIAGNOSIS — I73.9 PAD (PERIPHERAL ARTERY DISEASE) (HCC): ICD-10-CM

## 2019-09-04 DIAGNOSIS — R07.2 PRECORDIAL PAIN: ICD-10-CM

## 2019-09-04 PROCEDURE — 99213 OFFICE O/P EST LOW 20 MIN: CPT | Performed by: INTERNAL MEDICINE

## 2019-09-04 RX ORDER — ISOSORBIDE MONONITRATE 30 MG/1
30 TABLET, EXTENDED RELEASE ORAL DAILY
Qty: 30 TABLET | Refills: 11 | Status: SHIPPED | OUTPATIENT
Start: 2019-09-04 | End: 2020-03-11 | Stop reason: SDUPTHER

## 2019-09-04 RX ORDER — VARENICLINE TARTRATE 1 MG/1
TABLET, FILM COATED ORAL
Qty: 30 TABLET | Refills: 2 | Status: SHIPPED | OUTPATIENT
Start: 2019-09-04 | End: 2019-12-05 | Stop reason: SDUPTHER

## 2019-09-04 RX ORDER — LOSARTAN POTASSIUM 100 MG/1
100 TABLET ORAL DAILY
Refills: 5 | COMMUNITY
Start: 2019-09-02 | End: 2019-11-06 | Stop reason: ALTCHOICE

## 2019-09-04 RX ORDER — BUSPIRONE HYDROCHLORIDE 10 MG/1
10 TABLET ORAL 3 TIMES DAILY PRN
COMMUNITY
End: 2019-11-06

## 2019-09-04 RX ORDER — AMLODIPINE BESYLATE 5 MG/1
TABLET ORAL
Qty: 45 TABLET | Refills: 5 | Status: SHIPPED | OUTPATIENT
Start: 2019-09-04 | End: 2019-11-06 | Stop reason: SDUPTHER

## 2019-09-04 NOTE — PATIENT INSTRUCTIONS
Obesity, Adult  Obesity is the condition of having too much total body fat. Being overweight or obese means that your weight is greater than what is considered healthy for your body size. Obesity is determined by a measurement called BMI. BMI is an estimate of body fat and is calculated from height and weight. For adults, a BMI of 30 or higher is considered obese.  Obesity can eventually lead to other health concerns and major illnesses, including:  · Stroke.  · Coronary artery disease (CAD).  · Type 2 diabetes.  · Some types of cancer, including cancers of the colon, breast, uterus, and gallbladder.  · Osteoarthritis.  · High blood pressure (hypertension).  · High cholesterol.  · Sleep apnea.  · Gallbladder stones.  · Infertility problems.  What are the causes?  The main cause of obesity is taking in (consuming) more calories than your body uses for energy. Other factors that contribute to this condition may include:  · Being born with genes that make you more likely to become obese.  · Having a medical condition that causes obesity. These conditions include:  ? Hypothyroidism.  ? Polycystic ovarian syndrome (PCOS).  ? Binge-eating disorder.  ? Cushing syndrome.  · Taking certain medicines, such as steroids, antidepressants, and seizure medicines.  · Not being physically active (sedentary lifestyle).  · Living where there are limited places to exercise safely or buy healthy foods.  · Not getting enough sleep.  What increases the risk?  The following factors may increase your risk of this condition:  · Having a family history of obesity.  · Being a woman of -American descent.  · Being a man of  descent.  What are the signs or symptoms?  Having excessive body fat is the main symptom of this condition.  How is this diagnosed?  This condition may be diagnosed based on:  · Your symptoms.  · Your medical history.  · A physical exam. Your health care provider may measure:  ? Your BMI. If you are an adult  with a BMI between 25 and less than 30, you are considered overweight. If you are an adult with a BMI of 30 or higher, you are considered obese.  ? The distances around your hips and your waist (circumferences). These may be compared to each other to help diagnose your condition.  ? Your skinfold thickness. Your health care provider may gently pinch a fold of your skin and measure it.  How is this treated?  Treatment for this condition often includes changing your lifestyle. Treatment may include some or all of the following:  · Dietary changes. Work with your health care provider and a dietitian to set a weight-loss goal that is healthy and reasonable for you. Dietary changes may include eating:  ? Smaller portions. A portion size is the amount of a particular food that is healthy for you to eat at one time. This varies from person to person.  ? Low-calorie or low-fat options.  ? More whole grains, fruits, and vegetables.  · Regular physical activity. This may include aerobic activity (cardio) and strength training.  · Medicine to help you lose weight. Your health care provider may prescribe medicine if you are unable to lose 1 pound a week after 6 weeks of eating more healthily and doing more physical activity.  · Surgery. Surgical options may include gastric banding and gastric bypass. Surgery may be done if:  ? Other treatments have not helped to improve your condition.  ? You have a BMI of 40 or higher.  ? You have life-threatening health problems related to obesity.  Follow these instructions at home:    Eating and drinking    · Follow recommendations from your health care provider about what you eat and drink. Your health care provider may advise you to:  ? Limit fast foods, sweets, and processed snack foods.  ? Choose low-fat options, such as low-fat milk instead of whole milk.  ? Eat 5 or more servings of fruits or vegetables every day.  ? Eat at home more often. This gives you more control over what you  eat.  ? Choose healthy foods when you eat out.  ? Learn what a healthy portion size is.  ? Keep low-fat snacks on hand.  ? Avoid sugary drinks, such as soda, fruit juice, iced tea sweetened with sugar, and flavored milk.  ? Eat a healthy breakfast.  · Drink enough water to keep your urine clear or pale yellow.  · Do not go without eating for long periods of time (do not fast) or follow a fad diet. Fasting and fad diets can be unhealthy and even dangerous.  Physical Activity  · Exercise regularly, as told by your health care provider. Ask your health care provider what types of exercise are safe for you and how often you should exercise.  · Warm up and stretch before being active.  · Cool down and stretch after being active.  · Rest between periods of activity.  Lifestyle  · Limit the time that you spend in front of your TV, computer, or video game system.  · Find ways to reward yourself that do not involve food.  · Limit alcohol intake to no more than 1 drink a day for nonpregnant women and 2 drinks a day for men. One drink equals 12 oz of beer, 5 oz of wine, or 1½ oz of hard liquor.  General instructions  · Keep a weight loss journal to keep track of the food you eat and how much you exercise you get.  · Take over-the-counter and prescription medicines only as told by your health care provider.  · Take vitamins and supplements only as told by your health care provider.  · Consider joining a support group. Your health care provider may be able to recommend a support group.  · Keep all follow-up visits as told by your health care provider. This is important.  Contact a health care provider if:  · You are unable to meet your weight loss goal after 6 weeks of dietary and lifestyle changes.  This information is not intended to replace advice given to you by your health care provider. Make sure you discuss any questions you have with your health care provider.  Document Released: 01/25/2006 Document Revised: 05/22/2017  Document Reviewed: 10/05/2016  [employer] Interactive Patient Education © 2019 [employer]  MyPlate from [employer]    MyPlate is an outline of a general healthy diet based on the 2010 Dietary Guidelines for Americans, from the U.S. Department of Agriculture (USDA). It sets guidelines for how much food you should eat from each food group based on your age, sex, and level of physical activity.  What are tips for following MyPlate?  To follow MyPlate recommendations:  · Eat a wide variety of fruits and vegetables, grains, and protein foods.  · Serve smaller portions and eat less food throughout the day.  · Limit portion sizes to avoid overeating.  · Enjoy your food.  · Get at least 150 minutes of exercise every week. This is about 30 minutes each day, 5 or more days per week.  It can be difficult to have every meal look like MyPlate. Think about MyPlate as eating guidelines for an entire day, rather than each individual meal.  Fruits and vegetables  · Make half of your plate fruits and vegetables.  · Eat many different colors of fruits and vegetables each day.  · For a 2,000 calorie daily food plan, eat:  ? 2½ cups of vegetables every day.  ? 2 cups of fruit every day.  · 1 cup is equal to:  ? 1 cup raw or cooked vegetables.  ? 1 cup raw fruit.  ? 1 medium-sized orange, apple, or banana.  ? 1 cup 100% fruit or vegetable juice.  ? 2 cups raw leafy greens, such as lettuce, spinach, or kale.  ? ½ cup dried fruit.  Grains  · One fourth of your plate should be grains.  · Make at least half of the grains you eat each day whole grains.  · For a 2,000 calorie daily food plan, eat 6 oz of grains every day.  · 1 oz is equal to:  ? 1 slice bread.  ? 1 cup cereal.  ? ½ cup cooked rice, cereal, or pasta.  Protein  · One fourth of your plate should be protein.  · Eat a wide variety of protein foods, including meat, poultry, fish, eggs, beans, nuts, and tofu.  · For a 2,000 calorie daily food plan, eat 5½ oz of protein every day.  · 1 oz  is equal to:  ? 1 oz meat, poultry, or fish.  ? ¼ cup cooked beans.  ? 1 egg.  ? ½ oz nuts or seeds.  ? 1 Tbsp peanut butter.  Dairy  · Drink fat-free or low-fat (1%) milk.  · Eat or drink dairy as a side to meals.  · For a 2,000 calorie daily food plan, eat or drink 3 cups of dairy every day.  · 1 cup is equal to:  ? 1 cup milk, yogurt, cottage cheese, or soy milk (soy beverage).  ? 2 oz processed cheese.  ? 1½ oz natural cheese.  Fats, oils, salt, and sugars  · Only small amounts of oils are recommended.  · Avoid foods that are high in calories and low in nutritional value (empty calories), like foods high in fat or added sugars.  · Choose foods that are low in salt (sodium). Choose foods that have less than 140 milligrams (mg) of sodium per serving.  · Drink water instead of sugary drinks. Drink enough water each day to keep your urine pale yellow.  Where to find support  · Work with your health care provider or a nutrition specialist (dietitian) to develop a customized eating plan that is right for you.  · Download an bina (mobile application) to help you track your daily food intake.  Where to find more information  · Go to ChooseMyPlate.gov for more information.  · Learn more and log your daily food intake according to MyPlate using USDA's SuperTracker: www.supertracker.usda.gov  Summary  · MyPlate is a general guideline for healthy eating from the USDA. It is based on the 2010 Dietary Guidelines for Americans.  · In general, fruits and vegetables should take up ½ of your plate, grains should take up ¼ of your plate, and protein should take up ¼ of your plate.  This information is not intended to replace advice given to you by your health care provider. Make sure you discuss any questions you have with your health care provider.  Document Released: 01/06/2009 Document Revised: 03/19/2018 Document Reviewed: 03/19/2018  [employer] Interactive Patient Education © 2019 [employer]

## 2019-09-04 NOTE — PROGRESS NOTES
Subjective   Lyndsay Franco is a 69 y.o. female     Chief Complaint   Patient presents with   • Dizziness     Here for ER f/u   • Hypotension       PROBLEM LIST:       1.  Coronary artery disease  1.1 history of multiple stenting procedures in 2004 and  2016, inadequate data  1.2 stress test April 2018 demonstrates no evidence of ischemia and preserved LV function  1.31.  Stress test Scintigraphy demonstrates ischemia involving the inferoseptal segment, the basilar inferolateral segment, and the entire inferior wall.  1.4 cardiac catheterization March 2019 demonstrated chronic total occlusion of the RCA.  Nonobstructive LAD with high-grade stenosis involving the circumflex and obtuse marginal.  Angioplasty and stenting of the LAD and circumflex and stenting of the obtuse marginal.  Medical management recommended in recanalization of the  if symptoms did not improve  1.5 Successful  PCI RCA laser atherectomy and KRISTINE x 1 via LRA per Dr. Kwong at [place] on 8-14-19  2.  Peripheral vascular disease  2.1 history of iliac stenting bilaterally    2.2 follow-up femorofemoral bypass by Dr. Dupont at [facility] in [place]  2.2 follow-up CT angiogram May 2018 demonstrates occluded iliacs with femorofemoral bypass noted but no discussion on report in regards to patency or disease  2.3 intermittent claudication  2.4 questionable left renal artery stenosis  2.5 right renal artery stenting June 2018 by Dr. Mcallister and Dr. Dupont with no significant disease of the lower extremities identified  3.  Labile hypertension  4.  Infrarenal abdominal aortic aneurysm measuring 3 cm by CT scan May 2018  5.  Type 2 diabetes mellitus  6.  Dyslipidemia  7.  Chronic tobacco use  8.  Carotid artery stenosis  8.1 carotid duplex March 2019 demonstrates 50% left internal carotid artery stenosis on the left and less than 50% on the right with antegrade vertebral flow                Specialty Problems        Cardiology Problems     Bilateral carotid bruits        Coronary artery disease involving native coronary artery of native heart without angina pectoris        Decreased pedal pulses        Essential hypertension        Mixed hyperlipidemia        PAD (peripheral artery disease) (CMS/HCC)        Systolic ejection murmur        Renal artery stenosis (CMS/HCC)        PVD (peripheral vascular disease) with claudication (CMS/HCC)        Abnormal EKG                HPI:    Ms. Franco returns today after being evaluated in the emergency room at [facility] for dizziness.    She describes to me that she has intermittent dizziness associated with excursions of blood pressures to as low as systolic blood pressures in the 80s and diastolic pressures in the 50s.  She becomes extremely lightheaded and dizzy and occasionally palpitates during these episodes.  She had a typical episode after returning from [facility] on Sunday night but her symptoms did not resolve rapidly.  In the emergency room at [facility] she was not noted to be hypotensive, labs are felt compatible with mild dehydration, and the patient was anemic with an H&H of approximately 10 and 30.  She was hydrated, apparently the diuretics were stopped, and she was sent home.  Ms. Franco continued to have dizziness but symptoms have lessened.    She continues to be without chest pain since stenting of a chronically totally occluded right coronary artery as above, and she has no symptoms of heart failure or dysrhythmia except as above.  She has been compliant with medications, and she continues to refrain from cigarette smoking.                    CURRENT MEDICATION:    Current Outpatient Medications   Medication Sig Dispense Refill   • ALPRAZolam (XANAX) 0.5 MG tablet Take 0.5 mg by mouth 2 (Two) Times a Day As Needed.     • amLODIPine (NORVASC) 5 MG tablet Take 1 tablet by mouth 2 (Two) Times a Day. 60 tablet 4   • aspirin 81 MG tablet Take 81 mg by mouth 2 (Two) Times a  Day. 30 tablet 11   • atorvastatin (LIPITOR) 40 MG tablet Take 1 tablet by mouth Daily. 30 tablet 11   • busPIRone (BUSPAR) 10 MG tablet Take 10 mg by mouth 3 (Three) Times a Day As Needed.     • carvedilol (COREG) 25 MG tablet Take 1 tablet by mouth 3 (Three) Times a Day. 90 tablet 11   • CHANTIX CONTINUING MONTH KARIE 1 MG tablet As prescribed  2   • cholecalciferol (VITAMIN D3) 1000 units tablet Daily.  3   • CloNIDine (CATAPRES) 0.3 MG tablet 0.3 mg 2 (Two) Times a Day.  5   • clopidogrel (PLAVIX) 75 MG tablet Take 75 mg by mouth Daily.     • escitalopram (LEXAPRO) 10 MG tablet Daily As Needed.  5   • fexofenadine (ALLEGRA) 180 MG tablet Daily.  2   • HYDROcodone-acetaminophen (NORCO)  MG per tablet prn     • isosorbide mononitrate (IMDUR) 60 MG 24 hr tablet Daily.  0   • losartan (COZAAR) 100 MG tablet Daily.  5   • meloxicam (MOBIC) 15 MG tablet TAKE 1 TABLET EVERY DAY 90 tablet 3   • metFORMIN (GLUCOPHAGE) 500 MG tablet 2 (Two) Times a Day.     • pantoprazole (PROTONIX) 40 MG EC tablet Daily.     • potassium chloride (K-DUR,KLOR-CON) 10 MEQ CR tablet Take 10 mEq by mouth Daily.     • senna (SENOKOT) 8.6 MG tablet tablet Take  by mouth Every Night.     • traZODone (DESYREL) 50 MG tablet Take 50 mg by mouth Every Night.     • buPROPion SR (WELLBUTRIN SR) 150 MG 12 hr tablet TAKE 1 TABLET EACH MORNING FOR 3 DAYS,THEN TWICE DAILY  0   • montelukast (SINGULAIR) 10 MG tablet Take 10 mg by mouth Every Night.     • nitroglycerin (NITROSTAT) 0.4 MG SL tablet As Needed.       No current facility-administered medications for this visit.        ALLERGIES:    Patient has no known allergies.    PAST MEDICAL HISTORY:    Past Medical History:   Diagnosis Date   • Abnormal EKG 3/6/2019   • Anxiety    • Arthritis    • Coronary artery disease    • Depression    • Diabetes mellitus (CMS/HCC)    • GERD (gastroesophageal reflux disease)    • Hiatal hernia    • Hyperlipidemia    • Hypertension    • Myocardial infarction  (CMS/HCC)    • Non-functioning kidney     Left    • Peripheral vascular disease (CMS/HCC)    • Renal artery aneurysm (CMS/HCC)    • Systolic ejection murmur 3/27/2018       SURGICAL HISTORY:    Past Surgical History:   Procedure Laterality Date   • AORTAGRAM N/A 6/19/2018    Procedure: AORTAGRAM WITHOUT RUNOFFS WITH STENT, RIGHT FEM CUT DOWN;  Surgeon: Quang Mcallister MD;  Location: [place] [address];  Service: Vascular   • BREAST CYST EXCISION     • CARDIAC CATHETERIZATION      3/2019 -- 2 stents    • CHOLECYSTECTOMY     • CORONARY STENT PLACEMENT     • FEMORAL FEMORAL BYPASS     • HEMORROIDECTOMY     • HYSTERECTOMY         SOCIAL HISTORY:    Social History     Socioeconomic History   • Marital status:      Spouse name: Not on file   • Number of children: 3   • Years of education: Not on file   • Highest education level: Not on file   Occupational History   • Occupation: [employer]     Comment: Disabled-PVD   Tobacco Use   • Smoking status: Former Smoker     Packs/day: 0.75     Years: 40.00     Pack years: 30.00     Types: Cigarettes, Electronic Cigarette   • Smokeless tobacco: Never Used   Substance and Sexual Activity   • Alcohol use: No   • Drug use: No   • Sexual activity: Defer   Social History Narrative    Lives in [facility].        FAMILY HISTORY:    Family History   Adopted: Yes   Problem Relation Age of Onset   • No Known Problems Mother    • No Known Problems Father        Review of Systems   Constitutional: Positive for fatigue.   HENT: Negative.    Eyes: Positive for visual disturbance (reading glasses).   Respiratory: Negative.    Cardiovascular: Negative.    Gastrointestinal: Positive for constipation.   Endocrine: Negative.    Genitourinary: Negative.    Musculoskeletal: Positive for arthralgias and myalgias.   Skin: Negative.    Allergic/Immunologic: Positive for environmental allergies.   Neurological: Positive for dizziness and light-headedness.  "  Hematological: Bruises/bleeds easily.   Psychiatric/Behavioral: Negative.        VISIT VITALS:  Vitals:    09/04/19 1008   BP: 128/67   BP Location: Left arm   Patient Position: Sitting   Pulse: 75   SpO2: 98%   Weight: 82.5 kg (181 lb 12.8 oz)   Height: 162.6 cm (64.02\")      /67 (BP Location: Left arm, Patient Position: Sitting)   Pulse 75   Ht 162.6 cm (64.02\")   Wt 82.5 kg (181 lb 12.8 oz)   SpO2 98%   BMI 31.19 kg/m²     RECENT LABS:    Objective       Physical Exam    Procedures      Assessment/Plan   #1.  The patient is profoundly orthostatic today.  Apparently diuretics were stopped several days ago.  I have asked Ms. Franco to continue to push p.o. fluids, and we will decrease amlodipine to 2.5 mg in the morning 5 mg in the evening and follow blood pressures closely.  Additionally, I would like to decrease isosorbide to 30 mg daily post  stenting.    2.  Coronary artery disease.  The patient is asymptomatic of ischemia and on appropriate medications.  We will continue without change.    3.  Systemic hypertension.  We will need to follow blood pressures closely after reducing amlodipine to.    4.  Anemia with hemoglobin hematocrit of 10 and 30.  I have asked Ms. Franco to follow-up with Dr. Otoole in that regard.    5.  We will contact the patient telephonically in 1 week to assess symptoms and blood pressures and make medication adjustments accordingly.  She will follow with Dr. Otoole as soon as able, and we will plan to see her back in our office in 3 to 4 weeks or on a as needed basis.   Diagnosis Plan   1. Coronary artery disease involving native coronary artery of native heart without angina pectoris     2. Essential hypertension     3. Mixed hyperlipidemia     4. PAD (peripheral artery disease) (CMS/[facility])     5. PVD (peripheral vascular disease) with claudication (CMS/[facility])     6. Systolic ejection murmur     7. Claudication (CMS/HCC)         No Follow-up on file.                "   Patient's Body mass index is 31.19 kg/m². BMI is above normal parameters. Recommendations include: educational material and referral to primary care.       Josselin Fernandez LPN    Scribed for Dr. Quang Suggs by Josselin Fernandez LPN September 4, 2019 10:58 AM         Electronically signed by:            This note is dictated utilizing voice recognition software.  Although this record has been proof read, transcriptional errors may still be present. If questions occur regarding the content of this record please do not hesitate to call our office.

## 2019-09-11 ENCOUNTER — TELEPHONE (OUTPATIENT)
Dept: CARDIOLOGY | Facility: CLINIC | Age: 69
End: 2019-09-11

## 2019-09-11 NOTE — TELEPHONE ENCOUNTER
PATIENT STATES SINCE SHE HAS BEEN TAKING 2.5 MG NORVASC DOSE AT 12-1 PM AND TAKES 5 MG TAB AT 8:00 PM SHE DOESN'T HAVE ANY DIZZINESS/HYPOTENSIVE SX'S. STATES SHE TOOK ONE  ACCIDENTALLY ONE MORNING WITH HER OTHER B/P MEDS AND SHE HAD DIZZINESS AND COULDN'T HARDLY DO ANYTHING. DOES FINE WITH ABOVE SCHEDULED TIMES. DR. ECHEVERRIA AWARE AND FINE WITH ABOVE TIMES. PATIENT CALLED AWARE. MIKE JETTN

## 2019-10-03 ENCOUNTER — TELEPHONE (OUTPATIENT)
Dept: CARDIOLOGY | Facility: CLINIC | Age: 69
End: 2019-10-03

## 2019-10-03 NOTE — TELEPHONE ENCOUNTER
"Patient contacted our office and stated she had a 2:30 appointment to see Dev today, but was not going to be able to keep it. She states that her blood pressure is too low for her to \"safely drive by herself\" to get to an appointment with us. Notified Dr. Sousa's office of cancellation as they were the ones who wanted her seen.  "

## 2019-11-04 ENCOUNTER — TELEPHONE (OUTPATIENT)
Dept: CARDIOLOGY | Facility: CLINIC | Age: 69
End: 2019-11-04

## 2019-11-04 NOTE — TELEPHONE ENCOUNTER
PATIENT STATES WAS AT DR. MIN OFFICE LAST WEEK FOR A FLU SHOT AND B/P WAS 80/30. HOME B/P MACHINE NOT WORKING, HAS NOT PURCHASED ANOTHER ONE AND HAS NOT BEEN CHECKING HER BP. STATES SHE CAN TELL WHEN IT HIGH SHE WILL GET A H/A, WHEN ITS LOW SHE FEELS LIKE SHE IS GOING TO PASS OUT. SHE STATES DR. MIN WANTED HER TO STOP HER AMLODIPINE. KEPT INSISTING SHE NEEDS TO SEE DR. ECHEVERRIA, BUT I KEPT TELLING HER THAT DR. ECHEVERRIA OR NONE OF THE OTHER PROVIDERS WOULD DO ANY ADJUSTING TO HER MEDS WITHOUT HAVING ANY READINGS. I TOLD HER  SHE NEEDED TO GET A HOME B/P MACHINE AND MONITOR B/P A COUPLE OF DIFFERENT TIMES A DAY FOR APPROX. A WEEK AND CALL THE OFFICE BACK WITH THISE READINGS SO WHEN CAN SEE HOW HER B/P IS RUNNING. FINALLY HAD MERCEDES, , SPEAK WITH PATIENT AND SHE RE-ITIERATED THE SAME TJHING ABOVE I HAD TOLD HER. SHE INSTRUCTED HER TO GET A B/P MACHINE, CHECK IT FOR A WEEK AND BRING READINGS TO APPT. SCHEDULED FOR HER NEXT TUES. PATIENT STATED OK TO HER. SHE ALSO STATES SHE  HAD CHEST PAIN YEST ALONG WITH LEFT ARM PAIN, TOOK A NITRO. AND LAID DOWN AND CHEST PAIN RESOLVED. AWARE TO CALL THE OFFICE BACK IF SHE HAS ANYMORE C/P OR GOT O THE ER. STATES SHE WON'T GO BACK TO [facility]. HOSP.. VERBALIZED OK BAM JETT            ----- Message from Zion Smith sent at 11/4/2019  2:50 PM EST -----  Contact: [phone]  PT BLOOD PRESSURE IS DROPPING. REQUESTING TO TALK TO DR ECHEVERRIA (WANTS EARLIER APPT-HE HAS NOTHING) OFFERED SOMEONE ELSE BUT SHE WANTS DR ECHEVERRIA.   DOES NOT KNOW IF BP DROPPING IS DUE TO HER MEDICATIONS OR NOT.

## 2019-11-06 ENCOUNTER — OFFICE VISIT (OUTPATIENT)
Dept: CARDIOLOGY | Facility: CLINIC | Age: 69
End: 2019-11-06

## 2019-11-06 VITALS
OXYGEN SATURATION: 97 % | DIASTOLIC BLOOD PRESSURE: 76 MMHG | SYSTOLIC BLOOD PRESSURE: 123 MMHG | HEART RATE: 69 BPM | BODY MASS INDEX: 31.76 KG/M2 | HEIGHT: 64 IN | WEIGHT: 186 LBS

## 2019-11-06 DIAGNOSIS — R01.1 SYSTOLIC EJECTION MURMUR: ICD-10-CM

## 2019-11-06 DIAGNOSIS — E78.2 MIXED HYPERLIPIDEMIA: ICD-10-CM

## 2019-11-06 DIAGNOSIS — I73.9 CLAUDICATION (HCC): ICD-10-CM

## 2019-11-06 DIAGNOSIS — R07.2 PRECORDIAL PAIN: ICD-10-CM

## 2019-11-06 DIAGNOSIS — I73.9 PAD (PERIPHERAL ARTERY DISEASE) (HCC): ICD-10-CM

## 2019-11-06 DIAGNOSIS — I10 ESSENTIAL HYPERTENSION: ICD-10-CM

## 2019-11-06 DIAGNOSIS — M79.601 ARM PAIN, ANTERIOR, RIGHT: ICD-10-CM

## 2019-11-06 DIAGNOSIS — I73.9 PVD (PERIPHERAL VASCULAR DISEASE) WITH CLAUDICATION (HCC): ICD-10-CM

## 2019-11-06 DIAGNOSIS — R06.02 SHORTNESS OF BREATH: ICD-10-CM

## 2019-11-06 DIAGNOSIS — I25.10 CORONARY ARTERY DISEASE INVOLVING NATIVE CORONARY ARTERY OF NATIVE HEART WITHOUT ANGINA PECTORIS: Primary | ICD-10-CM

## 2019-11-06 PROCEDURE — 93000 ELECTROCARDIOGRAM COMPLETE: CPT | Performed by: INTERNAL MEDICINE

## 2019-11-06 PROCEDURE — 99212 OFFICE O/P EST SF 10 MIN: CPT | Performed by: INTERNAL MEDICINE

## 2019-11-06 RX ORDER — CETIRIZINE HYDROCHLORIDE 10 MG/1
10 TABLET ORAL DAILY
COMMUNITY
End: 2020-07-19

## 2019-11-06 RX ORDER — AMLODIPINE AND VALSARTAN 5; 160 MG/1; MG/1
0.5 TABLET ORAL EVERY MORNING
Qty: 15 TABLET | Refills: 11 | Status: SHIPPED | OUTPATIENT
Start: 2019-11-06 | End: 2019-11-18 | Stop reason: SDUPTHER

## 2019-11-06 RX ORDER — AMLODIPINE BESYLATE 2.5 MG/1
TABLET ORAL
Qty: 30 TABLET | Refills: 11 | Status: SHIPPED | OUTPATIENT
Start: 2019-11-06 | End: 2020-03-11 | Stop reason: DRUGHIGH

## 2019-11-06 RX ORDER — FLUTICASONE PROPIONATE 50 MCG
2 SPRAY, SUSPENSION (ML) NASAL DAILY
COMMUNITY
End: 2020-05-19

## 2019-11-06 RX ORDER — PRAVASTATIN SODIUM 40 MG
40 TABLET ORAL DAILY
COMMUNITY
End: 2019-11-18 | Stop reason: ALTCHOICE

## 2019-11-06 NOTE — PATIENT INSTRUCTIONS

## 2019-11-06 NOTE — PROGRESS NOTES
Subjective     Lyndsay Franco is a 69 y.o. female who presents to day for Coronary Artery Disease (c/o right arm shaking, started today, headache. Dr Otoole told pt to HOLD Norvasc, Losartan and Clonidine) and Hypertension.    CHIEF COMPLIANT  Chief Complaint   Patient presents with   • Coronary Artery Disease     c/o right arm shaking, started today, headache. Dr Otoole told pt to HOLD Norvasc, Losartan and Clonidine   • Hypertension     PROBLEM LIST:         1.  Coronary artery disease  1.1 history of multiple stenting procedures in 2004 and  2016, inadequate data  1.2 stress test April 2018 demonstrates no evidence of ischemia and preserved LV function  1.31.  Stress test Scintigraphy demonstrates ischemia involving the inferoseptal segment, the basilar inferolateral segment, and the entire inferior wall.  1.4 cardiac catheterization March 2019 demonstrated chronic total occlusion of the RCA.  Nonobstructive LAD with high-grade stenosis involving the circumflex and obtuse marginal.  Angioplasty and stenting of the LAD and circumflex and stenting of the obtuse marginal.  Medical management recommended in recanalization of the  if symptoms did not improve  1.5 Successful  PCI RCA laser atherectomy and KRISTINE x 1 via LRA per Dr. Kwong at [place] on 8-14-19  2.  Peripheral vascular disease  2.1 history of iliac stenting bilaterally    2.2 follow-up femorofemoral bypass by Dr. Dupont at [facility] in [place]  2.2 follow-up CT angiogram May 2018 demonstrates occluded iliacs with femorofemoral bypass noted but no discussion on report in regards to patency or disease  2.3 intermittent claudication  2.4 questionable left renal artery stenosis  2.5 right renal artery stenting June 2018 by Dr. Mcallister and Dr. Dupont with no significant disease of the lower extremities identified  3.  Labile hypertension  4.  Infrarenal abdominal aortic aneurysm measuring 3 cm by CT scan May 2018  5.  Type 2 diabetes  mellitus  6.  Dyslipidemia  7.  Chronic tobacco use  8.  Carotid artery stenosis  8.1 carotid duplex March 2019 demonstrates 50% left internal carotid artery stenosis on the left and less than 50% on the right with antegrade vertebral flow  Active Problems:  Problem List Items Addressed This Visit        Cardiovascular and Mediastinum    Coronary artery disease involving native coronary artery of native heart without angina pectoris - Primary    Essential hypertension    Mixed hyperlipidemia    Relevant Medications    pravastatin (PRAVACHOL) 40 MG tablet    PAD (peripheral artery disease) (CMS/HCC)    Systolic ejection murmur    PVD (peripheral vascular disease) with claudication (CMS/HCC)       Respiratory    Shortness of breath       Nervous and Auditory    Chest pain    Claudication (CMS/HCC)    Arm pain, anterior, right          HPI  Ms. Franco returns for medication adjustment.    When last seen here amlodipine was decreased because of symptomatic hypotension and orthostasis.  The patient's orthostatic symptoms and general hypotension worsened and she ultimately had amlodipine completely discontinued, her losartan discontinued, and her carvedilol discontinued.  She subsequently became extremely hypertensive.  Systolic blood pressures as ranged from 170s to over 200 general.  The patient continues to report episodes of hypotension.                          PRIOR MEDS  Current Outpatient Medications on File Prior to Visit   Medication Sig Dispense Refill   • ALPRAZolam (XANAX) 0.5 MG tablet Take 0.5 mg by mouth 2 (Two) Times a Day As Needed.     • amLODIPine (NORVASC) 5 MG tablet Take 2.5 mg In am and 5 mg in evening 45 tablet 5   • aspirin 81 MG tablet Take 81 mg by mouth 2 (Two) Times a Day. 30 tablet 11   • atorvastatin (LIPITOR) 40 MG tablet Take 1 tablet by mouth Daily. 30 tablet 11   • carvedilol (COREG) 25 MG tablet Take 1 tablet by mouth 3 (Three) Times a Day. 90 tablet 11   • cetirizine (zyrTEC) 10 MG  tablet Take 10 mg by mouth Daily.     • CHANTIX CONTINUING MONTH KARIE 1 MG tablet As prescribed 30 tablet 2   • CloNIDine (CATAPRES) 0.3 MG tablet 0.3 mg 2 (Two) Times a Day.  5   • clopidogrel (PLAVIX) 75 MG tablet Take 75 mg by mouth Daily.     • escitalopram (LEXAPRO) 10 MG tablet Daily As Needed.  5   • fexofenadine (ALLEGRA) 180 MG tablet Daily.  2   • fluticasone (FLONASE) 50 MCG/ACT nasal spray 2 sprays into the nostril(s) as directed by provider Daily.     • HYDROcodone-acetaminophen (NORCO)  MG per tablet prn     • isosorbide mononitrate (IMDUR) 30 MG 24 hr tablet Take 1 tablet by mouth Daily. 30 tablet 11   • losartan (COZAAR) 100 MG tablet 100 mg Daily.  5   • meloxicam (MOBIC) 15 MG tablet TAKE 1 TABLET EVERY DAY 90 tablet 3   • metFORMIN (GLUCOPHAGE) 500 MG tablet 2 (Two) Times a Day.     • nitroglycerin (NITROSTAT) 0.4 MG SL tablet As Needed.     • pantoprazole (PROTONIX) 40 MG EC tablet Daily.     • potassium chloride (K-DUR,KLOR-CON) 10 MEQ CR tablet Take 10 mEq by mouth Daily.     • pravastatin (PRAVACHOL) 40 MG tablet Take 40 mg by mouth Daily.     • senna (SENOKOT) 8.6 MG tablet tablet Take  by mouth Every Night.     • traZODone (DESYREL) 50 MG tablet Take 50 mg by mouth Every Night.     • [DISCONTINUED] busPIRone (BUSPAR) 10 MG tablet Take 10 mg by mouth 3 (Three) Times a Day As Needed.     • [DISCONTINUED] cholecalciferol (VITAMIN D3) 1000 units tablet Daily.  3     No current facility-administered medications on file prior to visit.        ALLERGIES  Patient has no known allergies.    HISTORY  Past Medical History:   Diagnosis Date   • Abnormal EKG 3/6/2019   • Anxiety    • Arthritis    • Coronary artery disease    • Depression    • Diabetes mellitus (CMS/HCC)    • GERD (gastroesophageal reflux disease)    • Hiatal hernia    • Hyperlipidemia    • Hypertension    • Myocardial infarction (CMS/HCC)    • Non-functioning kidney     Left    • Peripheral vascular disease (CMS/HCC)    • Renal  "artery aneurysm (CMS/HCC)    • Systolic ejection murmur 3/27/2018       Social History     Socioeconomic History   • Marital status:      Spouse name: Not on file   • Number of children: 3   • Years of education: Not on file   • Highest education level: Not on file   Occupational History   • Occupation: [employer]     Comment: Disabled-PVD   Tobacco Use   • Smoking status: Former Smoker     Packs/day: 0.75     Years: 40.00     Pack years: 30.00     Types: Cigarettes, Electronic Cigarette   • Smokeless tobacco: Never Used   Substance and Sexual Activity   • Alcohol use: No   • Drug use: No   • Sexual activity: Defer   Social History Narrative    Lives in [place] alone.        Family History   Adopted: Yes   Problem Relation Age of Onset   • No Known Problems Mother    • No Known Problems Father        Review of Systems   Constitutional: Positive for fatigue. Negative for chills and fever.   HENT: Negative.    Eyes: Positive for visual disturbance (readers).   Respiratory: Positive for chest tightness and shortness of breath.    Cardiovascular: Positive for chest pain (Sunday) and palpitations. Negative for leg swelling.   Gastrointestinal: Negative.    Endocrine: Negative.    Genitourinary: Negative.    Musculoskeletal: Positive for back pain. Negative for neck pain.   Skin: Negative.    Allergic/Immunologic: Positive for environmental allergies (seasonal ). Negative for food allergies.   Neurological: Positive for dizziness (if bp drops) and light-headedness. Negative for syncope.   Hematological: Bruises/bleeds easily (bruises and bleeds).   Psychiatric/Behavioral: Positive for sleep disturbance (problems sleeping at night, sleeps mostly during the day, denies waking with cp or soa).       Objective     VITALS: /52 (BP Location: Right arm, Patient Position: Sitting)   Pulse 69   Ht 162.6 cm (64\")   Wt 84.4 kg (186 lb)   SpO2 97%   BMI 31.93 kg/m²     LABS:   Lab Results " (most recent)     None          IMAGING:   No Images in the past 120 days found..    EXAM:  Physical Exam    Procedure     ECG 12 Lead  Date/Time: 11/6/2019 12:31 PM  Performed by: Quang Suggs MD  Authorized by: Quang Suggs MD   Comparison: not compared with previous ECG   Comments: Sinus bradycardia.  Non-specific ST segment changes.  No significant change except for rate.               Assessment/Plan      #1.  Hypertension.  I would like to restart baseline medications at a lower dose.  We will use Exforge 5/160 1/2 tablet in the morning and amlodipine 2.5 mg in the evening.  We will restart carvedilol at 12.5 mg twice daily.    2.  The patient will follow blood pressures closely and will report any extreme excursions as well as symptomatic hypotension.  She will continue to use clonidine on a as needed basis.    3.  Ms. Franco will follow with Dr. Otoole per his instructions we will plan on seeing her in follow-up in the office in a week to adjust medications as appropriate.   Diagnosis Plan   1. Coronary artery disease involving native coronary artery of native heart without angina pectoris     2. Essential hypertension     3. Mixed hyperlipidemia     4. PAD (peripheral artery disease) (CMS/HCC)     5. PVD (peripheral vascular disease) with claudication (CMS/HCC)     6. Systolic ejection murmur     7. Shortness of breath     8. Precordial pain     9. Arm pain, anterior, right     10. Claudication (CMS/HCC)         No Follow-up on file.    Lyndsay was seen today for coronary artery disease and hypertension.    Diagnoses and all orders for this visit:    Coronary artery disease involving native coronary artery of native heart without angina pectoris    Essential hypertension    Mixed hyperlipidemia    PAD (peripheral artery disease) (CMS/HCC)    PVD (peripheral vascular disease) with claudication (CMS/HCC)    Systolic ejection murmur    Shortness of breath    Precordial pain    Arm pain, anterior,  right    Claudication (CMS/HCC)                  Patient's Body mass index is 31.93 kg/m². BMI is above normal parameters. Recommendations include: educational material.           MEDS ORDERED DURING VISIT:  No orders of the defined types were placed in this encounter.    Scribed for Dr. Quang Suggs by Josselin Fernandez LPN November 6, 2019 12:22 PM

## 2019-11-18 ENCOUNTER — OFFICE VISIT (OUTPATIENT)
Dept: CARDIOLOGY | Facility: CLINIC | Age: 69
End: 2019-11-18

## 2019-11-18 VITALS
HEIGHT: 64 IN | HEART RATE: 73 BPM | DIASTOLIC BLOOD PRESSURE: 63 MMHG | BODY MASS INDEX: 32.44 KG/M2 | SYSTOLIC BLOOD PRESSURE: 123 MMHG | OXYGEN SATURATION: 94 % | WEIGHT: 190 LBS

## 2019-11-18 DIAGNOSIS — I73.9 PVD (PERIPHERAL VASCULAR DISEASE) WITH CLAUDICATION (HCC): ICD-10-CM

## 2019-11-18 DIAGNOSIS — R07.2 PRECORDIAL PAIN: ICD-10-CM

## 2019-11-18 DIAGNOSIS — R06.02 SHORTNESS OF BREATH: ICD-10-CM

## 2019-11-18 DIAGNOSIS — R01.1 SYSTOLIC EJECTION MURMUR: ICD-10-CM

## 2019-11-18 DIAGNOSIS — E78.2 MIXED HYPERLIPIDEMIA: ICD-10-CM

## 2019-11-18 DIAGNOSIS — I73.9 PAD (PERIPHERAL ARTERY DISEASE) (HCC): ICD-10-CM

## 2019-11-18 DIAGNOSIS — R09.89 DECREASED PEDAL PULSES: ICD-10-CM

## 2019-11-18 DIAGNOSIS — I25.10 CORONARY ARTERY DISEASE INVOLVING NATIVE CORONARY ARTERY OF NATIVE HEART WITHOUT ANGINA PECTORIS: ICD-10-CM

## 2019-11-18 DIAGNOSIS — I10 ESSENTIAL HYPERTENSION: ICD-10-CM

## 2019-11-18 DIAGNOSIS — I73.9 CLAUDICATION (HCC): ICD-10-CM

## 2019-11-18 DIAGNOSIS — I70.1 RENAL ARTERY STENOSIS (HCC): ICD-10-CM

## 2019-11-18 DIAGNOSIS — R09.89 BILATERAL CAROTID BRUITS: Primary | ICD-10-CM

## 2019-11-18 PROCEDURE — 99214 OFFICE O/P EST MOD 30 MIN: CPT | Performed by: INTERNAL MEDICINE

## 2019-11-18 RX ORDER — CLONIDINE HYDROCHLORIDE 0.3 MG/1
0.3 TABLET ORAL DAILY
COMMUNITY
End: 2020-07-24 | Stop reason: HOSPADM

## 2019-11-18 RX ORDER — AMLODIPINE AND VALSARTAN 5; 160 MG/1; MG/1
1 TABLET ORAL EVERY MORNING
Qty: 30 TABLET | Refills: 11 | Status: SHIPPED | OUTPATIENT
Start: 2019-11-18 | End: 2020-05-19

## 2019-11-18 NOTE — PATIENT INSTRUCTIONS
Obesity, Adult  Obesity is the condition of having too much total body fat. Being overweight or obese means that your weight is greater than what is considered healthy for your body size. Obesity is determined by a measurement called BMI. BMI is an estimate of body fat and is calculated from height and weight. For adults, a BMI of 30 or higher is considered obese.  Obesity can eventually lead to other health concerns and major illnesses, including:  · Stroke.  · Coronary artery disease (CAD).  · Type 2 diabetes.  · Some types of cancer, including cancers of the colon, breast, uterus, and gallbladder.  · Osteoarthritis.  · High blood pressure (hypertension).  · High cholesterol.  · Sleep apnea.  · Gallbladder stones.  · Infertility problems.  What are the causes?  The main cause of obesity is taking in (consuming) more calories than your body uses for energy. Other factors that contribute to this condition may include:  · Being born with genes that make you more likely to become obese.  · Having a medical condition that causes obesity. These conditions include:  ? Hypothyroidism.  ? Polycystic ovarian syndrome (PCOS).  ? Binge-eating disorder.  ? Cushing syndrome.  · Taking certain medicines, such as steroids, antidepressants, and seizure medicines.  · Not being physically active (sedentary lifestyle).  · Living where there are limited places to exercise safely or buy healthy foods.  · Not getting enough sleep.  What increases the risk?  The following factors may increase your risk of this condition:  · Having a family history of obesity.  · Being a woman of -American descent.  · Being a man of  descent.  What are the signs or symptoms?  Having excessive body fat is the main symptom of this condition.  How is this diagnosed?  This condition may be diagnosed based on:  · Your symptoms.  · Your medical history.  · A physical exam. Your health care provider may measure:  ? Your BMI. If you are an adult  with a BMI between 25 and less than 30, you are considered overweight. If you are an adult with a BMI of 30 or higher, you are considered obese.  ? The distances around your hips and your waist (circumferences). These may be compared to each other to help diagnose your condition.  ? Your skinfold thickness. Your health care provider may gently pinch a fold of your skin and measure it.  How is this treated?  Treatment for this condition often includes changing your lifestyle. Treatment may include some or all of the following:  · Dietary changes. Work with your health care provider and a dietitian to set a weight-loss goal that is healthy and reasonable for you. Dietary changes may include eating:  ? Smaller portions. A portion size is the amount of a particular food that is healthy for you to eat at one time. This varies from person to person.  ? Low-calorie or low-fat options.  ? More whole grains, fruits, and vegetables.  · Regular physical activity. This may include aerobic activity (cardio) and strength training.  · Medicine to help you lose weight. Your health care provider may prescribe medicine if you are unable to lose 1 pound a week after 6 weeks of eating more healthily and doing more physical activity.  · Surgery. Surgical options may include gastric banding and gastric bypass. Surgery may be done if:  ? Other treatments have not helped to improve your condition.  ? You have a BMI of 40 or higher.  ? You have life-threatening health problems related to obesity.  Follow these instructions at home:    Eating and drinking    · Follow recommendations from your health care provider about what you eat and drink. Your health care provider may advise you to:  ? Limit fast foods, sweets, and processed snack foods.  ? Choose low-fat options, such as low-fat milk instead of whole milk.  ? Eat 5 or more servings of fruits or vegetables every day.  ? Eat at home more often. This gives you more control over what you  eat.  ? Choose healthy foods when you eat out.  ? Learn what a healthy portion size is.  ? Keep low-fat snacks on hand.  ? Avoid sugary drinks, such as soda, fruit juice, iced tea sweetened with sugar, and flavored milk.  ? Eat a healthy breakfast.  · Drink enough water to keep your urine clear or pale yellow.  · Do not go without eating for long periods of time (do not fast) or follow a fad diet. Fasting and fad diets can be unhealthy and even dangerous.  Physical Activity  · Exercise regularly, as told by your health care provider. Ask your health care provider what types of exercise are safe for you and how often you should exercise.  · Warm up and stretch before being active.  · Cool down and stretch after being active.  · Rest between periods of activity.  Lifestyle  · Limit the time that you spend in front of your TV, computer, or video game system.  · Find ways to reward yourself that do not involve food.  · Limit alcohol intake to no more than 1 drink a day for nonpregnant women and 2 drinks a day for men. One drink equals 12 oz of beer, 5 oz of wine, or 1½ oz of hard liquor.  General instructions  · Keep a weight loss journal to keep track of the food you eat and how much you exercise you get.  · Take over-the-counter and prescription medicines only as told by your health care provider.  · Take vitamins and supplements only as told by your health care provider.  · Consider joining a support group. Your health care provider may be able to recommend a support group.  · Keep all follow-up visits as told by your health care provider. This is important.  Contact a health care provider if:  · You are unable to meet your weight loss goal after 6 weeks of dietary and lifestyle changes.  This information is not intended to replace advice given to you by your health care provider. Make sure you discuss any questions you have with your health care provider.  Document Released: 01/25/2006 Document Revised: 05/22/2017  Document Reviewed: 10/05/2016  [employer] Interactive Patient Education © 2019 [employer]  MyPlate from [employer]    MyPlate is an outline of a general healthy diet based on the 2010 Dietary Guidelines for Americans, from the U.S. Department of Agriculture (USDA). It sets guidelines for how much food you should eat from each food group based on your age, sex, and level of physical activity.  What are tips for following MyPlate?  To follow MyPlate recommendations:  · Eat a wide variety of fruits and vegetables, grains, and protein foods.  · Serve smaller portions and eat less food throughout the day.  · Limit portion sizes to avoid overeating.  · Enjoy your food.  · Get at least 150 minutes of exercise every week. This is about 30 minutes each day, 5 or more days per week.  It can be difficult to have every meal look like MyPlate. Think about MyPlate as eating guidelines for an entire day, rather than each individual meal.  Fruits and vegetables  · Make half of your plate fruits and vegetables.  · Eat many different colors of fruits and vegetables each day.  · For a 2,000 calorie daily food plan, eat:  ? 2½ cups of vegetables every day.  ? 2 cups of fruit every day.  · 1 cup is equal to:  ? 1 cup raw or cooked vegetables.  ? 1 cup raw fruit.  ? 1 medium-sized orange, apple, or banana.  ? 1 cup 100% fruit or vegetable juice.  ? 2 cups raw leafy greens, such as lettuce, spinach, or kale.  ? ½ cup dried fruit.  Grains  · One fourth of your plate should be grains.  · Make at least half of the grains you eat each day whole grains.  · For a 2,000 calorie daily food plan, eat 6 oz of grains every day.  · 1 oz is equal to:  ? 1 slice bread.  ? 1 cup cereal.  ? ½ cup cooked rice, cereal, or pasta.  Protein  · One fourth of your plate should be protein.  · Eat a wide variety of protein foods, including meat, poultry, fish, eggs, beans, nuts, and tofu.  · For a 2,000 calorie daily food plan, eat 5½ oz of protein every day.  · 1 oz  is equal to:  ? 1 oz meat, poultry, or fish.  ? ¼ cup cooked beans.  ? 1 egg.  ? ½ oz nuts or seeds.  ? 1 Tbsp peanut butter.  Dairy  · Drink fat-free or low-fat (1%) milk.  · Eat or drink dairy as a side to meals.  · For a 2,000 calorie daily food plan, eat or drink 3 cups of dairy every day.  · 1 cup is equal to:  ? 1 cup milk, yogurt, cottage cheese, or soy milk (soy beverage).  ? 2 oz processed cheese.  ? 1½ oz natural cheese.  Fats, oils, salt, and sugars  · Only small amounts of oils are recommended.  · Avoid foods that are high in calories and low in nutritional value (empty calories), like foods high in fat or added sugars.  · Choose foods that are low in salt (sodium). Choose foods that have less than 140 milligrams (mg) of sodium per serving.  · Drink water instead of sugary drinks. Drink enough water each day to keep your urine pale yellow.  Where to find support  · Work with your health care provider or a nutrition specialist (dietitian) to develop a customized eating plan that is right for you.  · Download an bina (mobile application) to help you track your daily food intake.  Where to find more information  · Go to ChooseMyPlate.gov for more information.  · Learn more and log your daily food intake according to MyPlate using USDA's SuperTracker: www.supertracker.usda.gov  Summary  · MyPlate is a general guideline for healthy eating from the USDA. It is based on the 2010 Dietary Guidelines for Americans.  · In general, fruits and vegetables should take up ½ of your plate, grains should take up ¼ of your plate, and protein should take up ¼ of your plate.  This information is not intended to replace advice given to you by your health care provider. Make sure you discuss any questions you have with your health care provider.  Document Released: 01/06/2009 Document Revised: 03/19/2018 Document Reviewed: 03/19/2018  [employer] Interactive Patient Education © 2019 [employer]

## 2019-11-18 NOTE — PROGRESS NOTES
Subjective   Lyndsay Franco is a 69 y.o. female     Chief Complaint   Patient presents with   • Coronary Artery Disease   • Hypertension     Here for 1 week f/u   • Hyperlipidemia   • Heart Murmur   • Peripheral Vascular Disease       PROBLEM LIST:     1.  Coronary artery disease  1.1 history of multiple stenting procedures in 2004 and  2016, inadequate data  1.2 stress test April 2018 demonstrates no evidence of ischemia and preserved LV function  1.31.  Stress test Scintigraphy demonstrates ischemia involving the inferoseptal segment, the basilar inferolateral segment, and the entire inferior wall.  1.4 cardiac catheterization March 2019 demonstrated chronic total occlusion of the RCA.  Nonobstructive LAD with high-grade stenosis involving the circumflex and obtuse marginal.  Angioplasty and stenting of the LAD and circumflex and stenting of the obtuse marginal.  Medical management recommended in recanalization of the  if symptoms did not improve  1.5 Successful  PCI RCA laser atherectomy and KRISTINE x 1 via LRA per Dr. Kwong at [place] on 8-14-19  2.  Peripheral vascular disease  2.1 history of iliac stenting bilaterally    2.2 follow-up femorofemoral bypass by Dr. Dupont at [facility] in [place]  2.2 follow-up CT angiogram May 2018 demonstrates occluded iliacs with femorofemoral bypass noted but no discussion on report in regards to patency or disease  2.3 intermittent claudication  2.4 questionable left renal artery stenosis  2.5 right renal artery stenting June 2018 by Dr. Mcallister and Dr. Dupont with no significant disease of the lower extremities identified  3.  Labile hypertension  4.  Infrarenal abdominal aortic aneurysm measuring 3 cm by CT scan May 2018  5.  Type 2 diabetes mellitus  6.  Dyslipidemia  7.  Chronic tobacco use  8.  Carotid artery stenosis  8.1 carotid duplex March 2019 demonstrates 50% left internal carotid artery stenosis on the left and less than 50% on the right with  antegrade vertebral flow                        Specialty Problems        Cardiology Problems    Bilateral carotid bruits        Coronary artery disease involving native coronary artery of native heart without angina pectoris        Decreased pedal pulses        Essential hypertension        Mixed hyperlipidemia        PAD (peripheral artery disease) (CMS/HCC)        Systolic ejection murmur        Renal artery stenosis (CMS/HCC)        PVD (peripheral vascular disease) with claudication (CMS/HCC)        Abnormal EKG                HPI:  Ms. Franco returns for follow-up to assess response to therapy.  She is now taking a full Exforge (5-160) and her blood pressures are well controlled.  She only occasionally uses clonidine on a current basis hypertension, and she has had no hypotensive episodes.  Ms. Franco continues to be without chest pain, orthopnea, PND, or significant lower extremity edema.  She has no symptoms of TIA or stroke and no symptoms of peripheral arterial disease.                            PRIOR MEDICATIONS    Current Outpatient Medications on File Prior to Visit   Medication Sig Dispense Refill   • ALPRAZolam (XANAX) 0.5 MG tablet Take 0.5 mg by mouth 2 (Two) Times a Day As Needed.     • amLODIPine (NORVASC) 2.5 MG tablet Take 2.5 mg every evening 30 tablet 11   • amLODIPine-valsartan (EXFORGE) 5-160 MG per tablet Take 0.5 tablets by mouth Every Morning. (Patient taking differently: Take 1 tablet by mouth Every Morning.) 15 tablet 11   • aspirin 81 MG tablet Take 81 mg by mouth 2 (Two) Times a Day. 30 tablet 11   • atorvastatin (LIPITOR) 40 MG tablet Take 1 tablet by mouth Daily. 30 tablet 11   • carvedilol (COREG) 25 MG tablet Take 1 tablet by mouth 3 (Three) Times a Day. 90 tablet 11   • cetirizine (zyrTEC) 10 MG tablet Take 10 mg by mouth Daily.     • cloNIDine (CATAPRES) 0.3 MG tablet Prn for systolic > 200     • clopidogrel (PLAVIX) 75 MG tablet Take 75 mg by mouth Daily.     • escitalopram  (LEXAPRO) 10 MG tablet Daily As Needed.  5   • fexofenadine (ALLEGRA) 180 MG tablet Daily.  2   • fluticasone (FLONASE) 50 MCG/ACT nasal spray 2 sprays into the nostril(s) as directed by provider Daily.     • HYDROcodone-acetaminophen (NORCO)  MG per tablet prn     • isosorbide mononitrate (IMDUR) 30 MG 24 hr tablet Take 1 tablet by mouth Daily. 30 tablet 11   • meloxicam (MOBIC) 15 MG tablet TAKE 1 TABLET EVERY DAY 90 tablet 3   • metFORMIN (GLUCOPHAGE) 500 MG tablet 2 (Two) Times a Day.     • pantoprazole (PROTONIX) 40 MG EC tablet Daily.     • potassium chloride (K-DUR,KLOR-CON) 10 MEQ CR tablet Take 10 mEq by mouth Daily.     • senna (SENOKOT) 8.6 MG tablet tablet Take  by mouth Every Night.     • traZODone (DESYREL) 50 MG tablet Take 50 mg by mouth Every Night.     • CHANTIX CONTINUING MONTH KARIE 1 MG tablet As prescribed 30 tablet 2   • nitroglycerin (NITROSTAT) 0.4 MG SL tablet As Needed.     • [DISCONTINUED] pravastatin (PRAVACHOL) 40 MG tablet Take 40 mg by mouth Daily.       No current facility-administered medications on file prior to visit.        ALLERGIES:    Patient has no known allergies.    PAST MEDICAL HISTORY:    Past Medical History:   Diagnosis Date   • Abnormal EKG 3/6/2019   • Anxiety    • Arthritis    • Coronary artery disease    • Depression    • Diabetes mellitus (CMS/HCC)    • GERD (gastroesophageal reflux disease)    • Hiatal hernia    • Hyperlipidemia    • Hypertension    • Myocardial infarction (CMS/HCC)    • Non-functioning kidney     Left    • Peripheral vascular disease (CMS/HCC)    • Renal artery aneurysm (CMS/HCC)    • Systolic ejection murmur 3/27/2018       SURGICAL HISTORY:    Past Surgical History:   Procedure Laterality Date   • AORTAGRAM N/A 6/19/2018    Procedure: AORTAGRAM WITHOUT RUNOFFS WITH STENT, RIGHT FEM CUT DOWN;  Surgeon: Quang Mcallister MD;  Location: [place] [address];  Service: Vascular   • BREAST CYST EXCISION     • CARDIAC CATHETERIZATION      3/2019  "-- 2 stents    • CHOLECYSTECTOMY     • CORONARY STENT PLACEMENT     • FEMORAL FEMORAL BYPASS     • HEMORROIDECTOMY     • HYSTERECTOMY         SOCIAL HISTORY:    Social History     Socioeconomic History   • Marital status:      Spouse name: Not on file   • Number of children: 3   • Years of education: Not on file   • Highest education level: Not on file   Occupational History   • Occupation: [employer]     Comment: Disabled-PVD   Tobacco Use   • Smoking status: Former Smoker     Packs/day: 0.75     Years: 40.00     Pack years: 30.00     Types: Cigarettes, Electronic Cigarette   • Smokeless tobacco: Never Used   Substance and Sexual Activity   • Alcohol use: No   • Drug use: No   • Sexual activity: Defer   Social History Narrative    Lives in [place] alone.        FAMILY HISTORY:    Family History   Adopted: Yes   Problem Relation Age of Onset   • No Known Problems Mother    • No Known Problems Father        Review of Systems   Constitutional: Positive for fatigue.   HENT: Negative.    Eyes: Positive for visual disturbance (glasses prn).   Respiratory: Negative.    Cardiovascular: Negative.    Gastrointestinal: Negative.    Endocrine: Negative.    Genitourinary: Negative.    Musculoskeletal: Positive for arthralgias and myalgias.   Skin: Negative.    Allergic/Immunologic: Positive for environmental allergies.   Neurological: Negative.    Hematological: Bruises/bleeds easily.   Psychiatric/Behavioral: Negative.        VISIT VITALS:  Vitals:    11/18/19 1035   BP: 123/63   BP Location: Left arm   Patient Position: Sitting   Pulse: 73   SpO2: 94%   Weight: 86.2 kg (190 lb)   Height: 162.6 cm (64.02\")      /63 (BP Location: Left arm, Patient Position: Sitting)   Pulse 73   Ht 162.6 cm (64.02\")   Wt 86.2 kg (190 lb)   SpO2 94%   BMI 32.60 kg/m²     RECENT LABS:    Objective       Physical Exam   Constitutional: She is oriented to person, place, and time. She appears well-developed " and well-nourished. No distress.   HENT:   Head: Normocephalic and atraumatic.   Eyes: Conjunctivae and EOM are normal. Pupils are equal, round, and reactive to light.   Neck: Normal range of motion. Neck supple. No hepatojugular reflux and no JVD present. Carotid bruit is present (bilat. louder on left). No tracheal deviation present.   Nl. Carotid upstrokes   Cardiovascular: Normal rate, regular rhythm, S1 normal, S2 normal and intact distal pulses. Exam reveals gallop and S4. Exam reveals no S3 and no friction rub.   No murmur heard.  Pulses:       Radial pulses are 2+ on the left side.   Rt. Radial slightly decreased but palpable     Pulmonary/Chest: Effort normal and breath sounds normal. She has no wheezes. She has no rhonchi. She has no rales.   Nl. Expir. Phase  Nl. Breath sound intensity  Bronchial breath sounds in the bases   Abdominal: Soft. Bowel sounds are normal. She exhibits no distension, no abdominal bruit and no mass. There is no tenderness. There is no rebound and no guarding.   No organomegaly   Musculoskeletal: Normal range of motion. She exhibits no edema, tenderness or deformity.   LLE, no edema, palpable pedal pulses  RLE, no edema, palpable pedal pulses   Neurological: She is alert and oriented to person, place, and time.   Skin: Skin is warm and dry. No rash noted. No erythema. No pallor.   Psychiatric: She has a normal mood and affect. Her behavior is normal. Judgment and thought content normal.   Nursing note and vitals reviewed.      Procedures      Assessment/Plan   #1.  Coronary artery disease status post PCI of chronic total occlusion of the right coronary artery.  The patient is asymptomatic of ischemia, heart failure, or dysrhythmia.  Current medications and close clinical follow-up will be continued.    2.  Treated and reasonably well controlled systemic hypertension.  I am very reluctant to them crease baseline antihypertensives because of the difficulties Ms. Franco has had in  "the past hypotension.  Therefore, we will continue current therapy.    3.  Not noted above the patient complains of anxiety and \"things hanging in my head\".  I advised Ms. Franco to follow-up with Dr. Otoole in that regard.    4.  Peripheral arterial disease with fairly loud neck bruits.  The patient nonobstructive disease by duplex.  We reviewed again today symptoms which might represent cerebral ischemia and the patient will report any of these immediately.    5.  Treated dyslipidemia.    6.  Tobacco habituation.  Ms. Franco is been cigarette free since August and has stopped using Chantix.  He was applauded on those efforts and will continue in that vein.    7.  The patient will follow with Dr. Otoole as instructed, and in our office in 6 months or on a as needed basis.   Diagnosis Plan   1. Bilateral carotid bruits     2. Coronary artery disease involving native coronary artery of native heart without angina pectoris     3. Decreased pedal pulses     4. Essential hypertension     5. Mixed hyperlipidemia     6. PAD (peripheral artery disease) (CMS/HCC)     7. PVD (peripheral vascular disease) with claudication (CMS/HCC)     8. Renal artery stenosis (CMS/HCC)     9. Systolic ejection murmur     10. Shortness of breath     11. Precordial pain     12. Claudication (CMS/HCC)         No Follow-up on file.                Patient's Body mass index is 32.6 kg/m². BMI is above normal parameters. Recommendations include: educational material and referral to primary care.       Josselin Fernandez LPN    Scribed for Dr. Quang Suggs by Josselin Fernandez LPN November 18, 2019 10:42 AM         Electronically signed by:            This note is dictated utilizing voice recognition software.  Although this record has been proof read, transcriptional errors may still be present. If questions occur regarding the content of this record please do not hesitate to call our office.     "

## 2019-12-05 DIAGNOSIS — F17.200 SMOKER: ICD-10-CM

## 2019-12-06 RX ORDER — VARENICLINE TARTRATE 1 MG/1
TABLET, FILM COATED ORAL
Qty: 56 TABLET | Refills: 2 | Status: SHIPPED | OUTPATIENT
Start: 2019-12-06 | End: 2020-04-06

## 2020-01-01 RX ORDER — CLONIDINE HYDROCHLORIDE 0.2 MG/1
TABLET ORAL
Qty: 30 TABLET | Refills: 5 | Status: SHIPPED | OUTPATIENT
Start: 2020-01-01

## 2020-03-09 ENCOUNTER — TELEPHONE (OUTPATIENT)
Dept: CARDIOLOGY | Facility: CLINIC | Age: 70
End: 2020-03-09

## 2020-03-09 RX ORDER — FUROSEMIDE 20 MG/1
20 TABLET ORAL AS NEEDED
COMMUNITY

## 2020-03-11 ENCOUNTER — OFFICE VISIT (OUTPATIENT)
Dept: CARDIOLOGY | Facility: CLINIC | Age: 70
End: 2020-03-11

## 2020-03-11 VITALS
BODY MASS INDEX: 32.64 KG/M2 | DIASTOLIC BLOOD PRESSURE: 82 MMHG | HEIGHT: 64 IN | SYSTOLIC BLOOD PRESSURE: 143 MMHG | WEIGHT: 191.2 LBS | OXYGEN SATURATION: 97 % | HEART RATE: 77 BPM

## 2020-03-11 DIAGNOSIS — I71.40 AAA (ABDOMINAL AORTIC ANEURYSM) WITHOUT RUPTURE (HCC): ICD-10-CM

## 2020-03-11 DIAGNOSIS — R06.02 SHORTNESS OF BREATH: Primary | ICD-10-CM

## 2020-03-11 DIAGNOSIS — R07.2 PRECORDIAL PAIN: ICD-10-CM

## 2020-03-11 DIAGNOSIS — I65.29 STENOSIS OF CAROTID ARTERY, UNSPECIFIED LATERALITY: ICD-10-CM

## 2020-03-11 DIAGNOSIS — I25.10 CORONARY ARTERY DISEASE INVOLVING NATIVE CORONARY ARTERY OF NATIVE HEART WITHOUT ANGINA PECTORIS: ICD-10-CM

## 2020-03-11 DIAGNOSIS — R09.89 CAROTID BRUIT, UNSPECIFIED LATERALITY: ICD-10-CM

## 2020-03-11 PROCEDURE — 99214 OFFICE O/P EST MOD 30 MIN: CPT | Performed by: PHYSICIAN ASSISTANT

## 2020-03-11 RX ORDER — NITROGLYCERIN 0.4 MG/1
0.4 TABLET SUBLINGUAL AS NEEDED
Qty: 25 TABLET | Refills: 1 | Status: SHIPPED | OUTPATIENT
Start: 2020-03-11

## 2020-03-11 RX ORDER — ISOSORBIDE MONONITRATE 60 MG/1
60 TABLET, EXTENDED RELEASE ORAL DAILY
Qty: 30 TABLET | Refills: 6 | Status: SHIPPED | OUTPATIENT
Start: 2020-03-11 | End: 2020-07-19

## 2020-03-11 RX ORDER — LOSARTAN POTASSIUM 100 MG/1
100 TABLET ORAL DAILY
COMMUNITY
End: 2020-07-19

## 2020-03-11 RX ORDER — PROMETHAZINE HYDROCHLORIDE 12.5 MG/1
TABLET ORAL AS NEEDED
COMMUNITY
Start: 2020-03-04

## 2020-03-11 RX ORDER — AMLODIPINE BESYLATE 5 MG/1
TABLET ORAL 2 TIMES DAILY
COMMUNITY
Start: 2020-03-03 | End: 2020-05-19

## 2020-03-11 NOTE — PATIENT INSTRUCTIONS

## 2020-03-11 NOTE — PROGRESS NOTES
Problem list     Subjective   Lyndsay Franco is a 69 y.o. female     Chief Complaint   Patient presents with   • Palpitations     presents for follow up   • Hypertension   • Shortness of Breath   PROBLEM LIST:         1.  Coronary artery disease  1.1 history of multiple stenting procedures in 2004 and  2016, inadequate data  1.2 stress test April 2018 demonstrates no evidence of ischemia and preserved LV function  1.31.  Stress test Scintigraphy demonstrates ischemia involving the inferoseptal segment, the basilar inferolateral segment, and the entire inferior wall.  1.4 cardiac catheterization March 2019 demonstrated chronic total occlusion of the RCA.  Nonobstructive LAD with high-grade stenosis involving the circumflex and obtuse marginal.  Angioplasty and stenting of the LAD and circumflex and stenting of the obtuse marginal.  Medical management recommended in recanalization of the  if symptoms did not improve  1.5 Successful  PCI RCA laser atherectomy and KRISTINE x 1 via LRA per Dr. Kwong at [facility] on 8-14-19  2.  Peripheral vascular disease  2.1 history of iliac stenting bilaterally    2.2 follow-up femorofemoral bypass by Dr. Dupont at [facility] in [place]  2.2 follow-up CT angiogram May 2018 demonstrates occluded iliacs with femorofemoral bypass noted but no discussion on report in regards to patency or disease  2.3 intermittent claudication  2.4 questionable left renal artery stenosis  2.5 right renal artery stenting June 2018 by Dr. Mcallister and Dr. Dupont with no significant disease of the lower extremities identified  3.  Labile hypertension  4.  Infrarenal abdominal aortic aneurysm measuring 3 cm by CT scan May 2018  5.  Type 2 diabetes mellitus  6.  Dyslipidemia  7.  Chronic tobacco use  8.  Carotid artery stenosis  8.1 carotid duplex March 2019 demonstrates 50% left internal carotid artery stenosis on the left and less than 50% on the right with antegrade vertebral flow    HPI    Patient  is a 69-year-old female who presents back to the office for follow-up.  She presents on an expedited manner in the setting that she has been developing left arm discomfort with numbness and chest discomfort.  Patient has history of significant coronary disease that required PCI of a chronic total occlusion recently at [facility].  She has significant vasculopathy with carotid disease as well as lower extremity disease.  Patient has had multiple procedures performed.  Patient is on statin therapy.  She continues to smoke.    She has had this discomfort that is been ongoing for the last few days and even up to weeks.  Her dyspnea has been quite significant.  She has mild to moderate shortness of breath but no severe dyspnea.  However, her symptoms of arm and chest discomfort have concerned her.  She does not describe PND orthopnea.    She does not describe palpitations or dysrhythmic symptoms.  She otherwise is doing well      Current Outpatient Medications on File Prior to Visit   Medication Sig Dispense Refill   • ALPRAZolam (XANAX) 0.5 MG tablet Take 0.5 mg by mouth 2 (Two) Times a Day As Needed.     • amLODIPine (NORVASC) 5 MG tablet 2 (Two) Times a Day.     • aspirin 81 MG tablet Take 81 mg by mouth 2 (Two) Times a Day. 30 tablet 11   • atorvastatin (LIPITOR) 40 MG tablet Take 1 tablet by mouth Daily. 30 tablet 11   • carvedilol (COREG) 25 MG tablet Take 1 tablet by mouth 3 (Three) Times a Day. 90 tablet 11   • cetirizine (zyrTEC) 10 MG tablet Take 10 mg by mouth Daily.     • CHANTIX CONTINUING MONTH KARIE 1 MG tablet AS PRESCRIBED 56 tablet 2   • cloNIDine (CATAPRES) 0.3 MG tablet Prn for systolic > 200     • clopidogrel (PLAVIX) 75 MG tablet Take 75 mg by mouth Daily.     • escitalopram (LEXAPRO) 10 MG tablet Daily As Needed.  5   • fexofenadine (ALLEGRA) 180 MG tablet Daily.  2   • fluticasone (FLONASE) 50 MCG/ACT nasal spray 2 sprays into the nostril(s) as directed by provider Daily.     • furosemide (LASIX) 20  MG tablet Take 20 mg by mouth As Needed.     • HYDROcodone-acetaminophen (NORCO)  MG per tablet prn     • losartan (COZAAR) 100 MG tablet Take 100 mg by mouth Daily.     • metFORMIN (GLUCOPHAGE) 500 MG tablet 2 (Two) Times a Day.     • pantoprazole (PROTONIX) 40 MG EC tablet Daily.     • potassium chloride (K-DUR,KLOR-CON) 10 MEQ CR tablet Take 10 mEq by mouth Daily.     • promethazine (PHENERGAN) 12.5 MG tablet As Needed.     • senna (SENOKOT) 8.6 MG tablet tablet Take  by mouth Every Night.     • traZODone (DESYREL) 50 MG tablet Take 50 mg by mouth Every Night.     • [DISCONTINUED] amLODIPine (NORVASC) 2.5 MG tablet Take 2.5 mg every evening 30 tablet 11   • [DISCONTINUED] isosorbide mononitrate (IMDUR) 30 MG 24 hr tablet Take 1 tablet by mouth Daily. 30 tablet 11   • [DISCONTINUED] nitroglycerin (NITROSTAT) 0.4 MG SL tablet As Needed.     • amLODIPine-valsartan (EXFORGE) 5-160 MG per tablet Take 1 tablet by mouth Every Morning. 30 tablet 11   • [DISCONTINUED] meloxicam (MOBIC) 15 MG tablet TAKE 1 TABLET EVERY DAY 90 tablet 3     No current facility-administered medications on file prior to visit.        Patient has no known allergies.    Past Medical History:   Diagnosis Date   • Abnormal EKG 3/6/2019   • Anxiety    • Arthritis    • Coronary artery disease    • Depression    • Diabetes mellitus (CMS/HCC)    • GERD (gastroesophageal reflux disease)    • Hiatal hernia    • Hyperlipidemia    • Hypertension    • Myocardial infarction (CMS/HCC)    • Non-functioning kidney     Left    • Peripheral vascular disease (CMS/HCC)    • Renal artery aneurysm (CMS/HCC)    • Stenosis of carotid artery 3/11/2020   • Systolic ejection murmur 3/27/2018       Social History     Socioeconomic History   • Marital status:      Spouse name: Not on file   • Number of children: 3   • Years of education: Not on file   • Highest education level: Not on file   Occupational History   • Occupation: [employer]   "    Comment: Disabled-PVD   Tobacco Use   • Smoking status: Former Smoker     Packs/day: 0.75     Years: 40.00     Pack years: 30.00     Types: Cigarettes, Electronic Cigarette   • Smokeless tobacco: Never Used   Substance and Sexual Activity   • Alcohol use: No   • Drug use: No   • Sexual activity: Defer   Social History Narrative    Lives in [place] alone.        Family History   Adopted: Yes   Problem Relation Age of Onset   • No Known Problems Mother    • No Known Problems Father        Review of Systems   Constitutional: Positive for diaphoresis.   HENT: Negative.    Eyes: Negative.    Respiratory: Positive for chest tightness and shortness of breath (with increased activity).    Cardiovascular: Positive for chest pain and leg swelling. Negative for palpitations.   Gastrointestinal: Positive for vomiting.   Endocrine: Negative.    Genitourinary: Negative.    Musculoskeletal: Positive for arthralgias, back pain, myalgias and neck pain.   Skin: Negative.    Allergic/Immunologic: Negative.    Neurological: Positive for numbness (tingle down left arm).   Hematological: Negative.    Psychiatric/Behavioral: Negative.    All other systems reviewed and are negative.      Objective   Vitals:    03/11/20 1503   BP: 143/82   BP Location: Left arm   Patient Position: Sitting   Pulse: 77   SpO2: 97%   Weight: 86.7 kg (191 lb 3.2 oz)   Height: 162.6 cm (64.02\")      /82 (BP Location: Left arm, Patient Position: Sitting)   Pulse 77   Ht 162.6 cm (64.02\")   Wt 86.7 kg (191 lb 3.2 oz)   SpO2 97%   BMI 32.80 kg/m²     Lab Results (most recent)     None          Physical Exam   Constitutional: She is oriented to person, place, and time. She appears well-developed and well-nourished. No distress.   HENT:   Head: Normocephalic and atraumatic.   Eyes: Conjunctivae are normal. Right eye exhibits no discharge. Left eye exhibits no discharge. No scleral icterus.   Neck: No JVD present. Carotid bruit is present. "   Cardiovascular: Normal rate, regular rhythm, normal heart sounds and intact distal pulses. Exam reveals no gallop and no friction rub.   No murmur heard.  Pulmonary/Chest: Effort normal and breath sounds normal. No respiratory distress. She has no wheezes. She has no rales. She exhibits no tenderness.   Musculoskeletal: Normal range of motion. She exhibits no edema.   Neurological: She is alert and oriented to person, place, and time. No cranial nerve deficit.   Skin: Skin is warm and dry. No rash noted. No erythema. No pallor.   Psychiatric: She has a normal mood and affect. Her behavior is normal.   Nursing note and vitals reviewed.      Procedure   Procedures       Assessment/Plan     Problems Addressed this Visit        Cardiovascular and Mediastinum    Coronary artery disease involving native coronary artery of native heart without angina pectoris    Relevant Medications    amLODIPine (NORVASC) 5 MG tablet    isosorbide mononitrate (IMDUR) 60 MG 24 hr tablet    nitroglycerin (NITROSTAT) 0.4 MG SL tablet    Other Relevant Orders    Adult Transthoracic Echo Complete W/ Cont if Necessary Per Protocol    Stress Test With Myocardial Perfusion One Day    Duplex Carotid Ultrasound CAR    US Aorta Limited    Carotid bruit    Relevant Orders    Duplex Carotid Ultrasound CAR    US Aorta Limited    Stenosis of carotid artery    Relevant Orders    Duplex Carotid Ultrasound CAR    US Aorta Limited    AAA (abdominal aortic aneurysm) without rupture (CMS/HCC)    Relevant Orders    Duplex Carotid Ultrasound CAR    US Aorta Limited       Respiratory    Shortness of breath - Primary    Relevant Orders    Adult Transthoracic Echo Complete W/ Cont if Necessary Per Protocol    Stress Test With Myocardial Perfusion One Day    Duplex Carotid Ultrasound CAR    US Aorta Limited       Nervous and Auditory    Chest pain    Relevant Medications    isosorbide mononitrate (IMDUR) 60 MG 24 hr tablet    Other Relevant Orders    Adult  Transthoracic Echo Complete W/ Cont if Necessary Per Protocol    Stress Test With Myocardial Perfusion One Day    Duplex Carotid Ultrasound CAR    US Aorta Limited            Recommendation  1.  Patient with significant vasculopathy and recurrent multivessel disease requiring interventions.  I feel that we need to rule out ischemia as a cause of her symptoms.  2.  Lexiscan stress test will be ordered  3.  Echocardiogram to confirm good LV function good LV structure and assess diastolic performance  4.  Patient with hypertension and infrarenal abdominal aortic aneurysm.  We will schedule aortic ultrasound  5.  Carotid duplex will be ordered because of carotid bruit and known 50% disease.  6.  I am increasing isosorbide to 60 mg which I think will help more in regards to her chronic angina.  7.  She has nitroglycerin available as I am refilling it.  We will see her back for follow-up on testing.  She will follow with primary as scheduled         Lyndsay Franco  reports that she has quit smoking. Her smoking use included cigarettes and electronic cigarette. She has a 30.00 pack-year smoking history. She has never used smokeless tobacco.      Patient's Body mass index is 32.8 kg/m². BMI is above normal parameters. Recommendations include: educational material.       Electronically signed by:

## 2020-04-03 DIAGNOSIS — F17.200 SMOKER: ICD-10-CM

## 2020-04-06 DIAGNOSIS — I71.40 AAA (ABDOMINAL AORTIC ANEURYSM) WITHOUT RUPTURE (HCC): Primary | ICD-10-CM

## 2020-04-06 DIAGNOSIS — I73.9 PAD (PERIPHERAL ARTERY DISEASE) (HCC): ICD-10-CM

## 2020-04-06 RX ORDER — VARENICLINE TARTRATE 1 MG/1
TABLET, FILM COATED ORAL
Qty: 56 TABLET | Refills: 2 | Status: SHIPPED | OUTPATIENT
Start: 2020-04-06

## 2020-04-17 ENCOUNTER — TELEPHONE (OUTPATIENT)
Dept: CARDIOLOGY | Facility: CLINIC | Age: 70
End: 2020-04-17

## 2020-04-17 NOTE — TELEPHONE ENCOUNTER
LUIS AT [facility] DR[facility]'S OFFICE, PCP, HAS ALREADY ADDRESSED PA. PH,LPN          ----- Message from Zion Gamboa sent at 4/16/2020  6:09 PM EDT -----  LUIS WITH  [facility] IN [place] CALLED REGARDING A PA FOR PT'S CHANTIX.  CAN YOU PLEASE WORK ON THIS?    THANK YOU,  MERCEDES

## 2020-05-04 DIAGNOSIS — E78.2 MIXED HYPERLIPIDEMIA: ICD-10-CM

## 2020-05-04 DIAGNOSIS — I73.9 PVD (PERIPHERAL VASCULAR DISEASE) WITH CLAUDICATION (HCC): ICD-10-CM

## 2020-05-04 DIAGNOSIS — I25.10 CORONARY ARTERY DISEASE INVOLVING NATIVE CORONARY ARTERY OF NATIVE HEART WITHOUT ANGINA PECTORIS: ICD-10-CM

## 2020-05-04 DIAGNOSIS — I73.9 PAD (PERIPHERAL ARTERY DISEASE) (HCC): ICD-10-CM

## 2020-05-04 RX ORDER — ATORVASTATIN CALCIUM 40 MG/1
40 TABLET, FILM COATED ORAL DAILY
Qty: 30 TABLET | Refills: 11 | Status: SHIPPED | OUTPATIENT
Start: 2020-05-04

## 2020-05-19 ENCOUNTER — OFFICE VISIT (OUTPATIENT)
Dept: CARDIOLOGY | Facility: CLINIC | Age: 70
End: 2020-05-19

## 2020-05-19 VITALS
WEIGHT: 187.2 LBS | BODY MASS INDEX: 31.96 KG/M2 | OXYGEN SATURATION: 96 % | SYSTOLIC BLOOD PRESSURE: 149 MMHG | HEIGHT: 64 IN | DIASTOLIC BLOOD PRESSURE: 76 MMHG | HEART RATE: 60 BPM

## 2020-05-19 DIAGNOSIS — I71.40 AAA (ABDOMINAL AORTIC ANEURYSM) WITHOUT RUPTURE (HCC): Primary | ICD-10-CM

## 2020-05-19 DIAGNOSIS — R06.02 SHORTNESS OF BREATH: ICD-10-CM

## 2020-05-19 DIAGNOSIS — R07.2 PRECORDIAL PAIN: ICD-10-CM

## 2020-05-19 DIAGNOSIS — I10 ESSENTIAL HYPERTENSION: ICD-10-CM

## 2020-05-19 DIAGNOSIS — R09.89 CAROTID BRUIT, UNSPECIFIED LATERALITY: ICD-10-CM

## 2020-05-19 DIAGNOSIS — I25.10 CORONARY ARTERY DISEASE INVOLVING NATIVE CORONARY ARTERY OF NATIVE HEART WITHOUT ANGINA PECTORIS: ICD-10-CM

## 2020-05-19 DIAGNOSIS — R01.1 SYSTOLIC EJECTION MURMUR: ICD-10-CM

## 2020-05-19 DIAGNOSIS — I73.9 PAD (PERIPHERAL ARTERY DISEASE) (HCC): ICD-10-CM

## 2020-05-19 DIAGNOSIS — I65.29 STENOSIS OF CAROTID ARTERY, UNSPECIFIED LATERALITY: ICD-10-CM

## 2020-05-19 DIAGNOSIS — I73.9 PVD (PERIPHERAL VASCULAR DISEASE) WITH CLAUDICATION (HCC): ICD-10-CM

## 2020-05-19 DIAGNOSIS — Z98.890 S/P CARDIAC CATH: ICD-10-CM

## 2020-05-19 DIAGNOSIS — I73.9 CLAUDICATION (HCC): ICD-10-CM

## 2020-05-19 DIAGNOSIS — I70.1 RENAL ARTERY STENOSIS (HCC): ICD-10-CM

## 2020-05-19 DIAGNOSIS — R94.31 ABNORMAL EKG: ICD-10-CM

## 2020-05-19 DIAGNOSIS — R09.89 DECREASED PEDAL PULSES: ICD-10-CM

## 2020-05-19 DIAGNOSIS — E78.2 MIXED HYPERLIPIDEMIA: ICD-10-CM

## 2020-05-19 PROCEDURE — 99214 OFFICE O/P EST MOD 30 MIN: CPT | Performed by: INTERNAL MEDICINE

## 2020-05-19 NOTE — PROGRESS NOTES
Subjective   Lyndsay Franco is a 69 y.o. female     Chief Complaint   Patient presents with   • Coronary Artery Disease     Here for  6 mo. f/u   • Hypertension   • Hyperlipidemia   • Peripheral Vascular Disease       PROBLEM LIST:       1.  Coronary artery disease  1.1 history of multiple stenting procedures in 2004 and  2016, inadequate data  1.2 stress test April 2018 demonstrates no evidence of ischemia and preserved LV function  1.31.  Stress test Scintigraphy demonstrates ischemia involving the inferoseptal segment, the basilar inferolateral segment, and the entire inferior wall.  1.4 cardiac catheterization March 2019 demonstrated chronic total occlusion of the RCA.  Nonobstructive LAD with high-grade stenosis involving the circumflex and obtuse marginal.  Angioplasty and stenting of the LAD and circumflex and stenting of the obtuse marginal.  Medical management recommended in recanalization of the  if symptoms did not improve  1.5 Successful  PCI RCA laser atherectomy and KRISTINE x 1 via LRA per Dr. Kwong at [place] [facility] on 8-14-19  2.  Peripheral vascular disease  2.1 history of iliac stenting bilaterally    2.2 follow-up femorofemoral bypass by Dr. Dupont at [facility] in [place]  2.2 follow-up CT angiogram May 2018 demonstrates occluded iliacs with femorofemoral bypass noted but no discussion on report in regards to patency or disease  2.3 intermittent claudication  2.4 questionable left renal artery stenosis  2.5 right renal artery stenting June 2018 by Dr. Mcallister and Dr. Dupont with no significant disease of the lower extremities identified  3.  Labile hypertension  4.  Infrarenal abdominal aortic aneurysm measuring 3 cm by CT scan May 2018  5.  Type 2 diabetes mellitus  6.  Dyslipidemia  7.  Chronic tobacco use  8.  Carotid artery stenosis  8.1 carotid duplex March 2019 demonstrates 50% left internal carotid artery stenosis on the left and less than 50% on the right with antegrade vertebral  "flow        Specialty Problems        Cardiology Problems    Carotid bruit        Coronary artery disease involving native coronary artery of native heart without angina pectoris        Decreased pedal pulses        Essential hypertension        Mixed hyperlipidemia        PAD (peripheral artery disease) (CMS/HCC)        Systolic ejection murmur        Renal artery stenosis (CMS/HCC)        PVD (peripheral vascular disease) with claudication (CMS/HCC)        Abnormal EKG        AAA (abdominal aortic aneurysm) without rupture (CMS/HCC)        Stenosis of carotid artery                HPI:  Ms. Franco returns for follow-up on the above.    She did not perform any of the imaging studies that were previously ordered because of concern of exposure in the setting of COVID-19.    Currently, she describes none of the angina that she had prior to any of her mechanical revascularization procedures.  She has stable class III exertional dyspnea.  She denies orthopnea, PND, and has only very minimal lower extremity edema when she is on her feet for prolonged periods of time.  Ms. Franco does describe that her \"hips lock up\" when she is walking for any period of time.  She can stop and rest for several minutes the pain resolves and she can continue.  I feel that she is describing claudication rather than arthritic symptoms.  However, she has no calf claudication, no impaired wound healing, and no complaints of cold feet.    Ms. Franco has no palpitations.  She describes mild orthostatic lightheadedness but no presyncope or syncope.  She is compliant with medications.  Blood pressures are generally well controlled and she checks those frequently.  She has had no symptoms of TIA or stroke.  The patient does describe chronic fatigue and that blood work was recently done through Dr. Otoole office and demonstrated that she was \"2 pints low\" of blood.  I instructed the patient to follow aggressively with Dr. Otoole in that regard.  " She describes no chronic abdominal pain, abdominal swelling or increasing girth, she has no symptoms to suggest intestinal angina.                          PRIOR MEDICATIONS    Current Outpatient Medications on File Prior to Visit   Medication Sig Dispense Refill   • ALPRAZolam (XANAX) 0.5 MG tablet Take 0.5 mg by mouth 2 (Two) Times a Day As Needed.     • aspirin 81 MG tablet Take 81 mg by mouth 2 (Two) Times a Day. 30 tablet 11   • atorvastatin (LIPITOR) 40 MG tablet TAKE 1 TABLET BY MOUTH DAILY. 30 tablet 11   • carvedilol (COREG) 25 MG tablet Take 1 tablet by mouth 3 (Three) Times a Day. 90 tablet 11   • cetirizine (zyrTEC) 10 MG tablet Take 10 mg by mouth Daily.     • CHANTIX CONTINUING MONTH KARIE 1 MG tablet AS PRESCRIBED 56 tablet 2   • cloNIDine (CATAPRES) 0.3 MG tablet Prn for systolic > 200     • clopidogrel (PLAVIX) 75 MG tablet Take 75 mg by mouth Daily.     • escitalopram (LEXAPRO) 10 MG tablet Daily As Needed.  5   • furosemide (LASIX) 20 MG tablet Take 20 mg by mouth As Needed.     • HYDROcodone-acetaminophen (NORCO)  MG per tablet prn     • isosorbide mononitrate (IMDUR) 60 MG 24 hr tablet Take 1 tablet by mouth Daily. 30 tablet 6   • losartan (COZAAR) 100 MG tablet Take 100 mg by mouth Daily.     • metFORMIN (GLUCOPHAGE) 500 MG tablet 2 (Two) Times a Day.     • pantoprazole (PROTONIX) 40 MG EC tablet Daily.     • potassium chloride (K-DUR,KLOR-CON) 10 MEQ CR tablet Take 10 mEq by mouth Daily.     • promethazine (PHENERGAN) 12.5 MG tablet As Needed.     • senna (SENOKOT) 8.6 MG tablet tablet Take  by mouth Every Night.     • traZODone (DESYREL) 50 MG tablet Take 50 mg by mouth Every Night.     • nitroglycerin (NITROSTAT) 0.4 MG SL tablet Take 1 tablet by mouth As Needed for Chest Pain. 25 tablet 1   • [DISCONTINUED] amLODIPine (NORVASC) 5 MG tablet 2 (Two) Times a Day.     • [DISCONTINUED] amLODIPine-valsartan (EXFORGE) 5-160 MG per tablet Take 1 tablet by mouth Every Morning. 30 tablet 11   •  [DISCONTINUED] fexofenadine (ALLEGRA) 180 MG tablet Daily.  2   • [DISCONTINUED] fluticasone (FLONASE) 50 MCG/ACT nasal spray 2 sprays into the nostril(s) as directed by provider Daily.       No current facility-administered medications on file prior to visit.        ALLERGIES:    Patient has no known allergies.    PAST MEDICAL HISTORY:    Past Medical History:   Diagnosis Date   • Abnormal EKG 3/6/2019   • Anxiety    • Arthritis    • Coronary artery disease    • Depression    • Diabetes mellitus (CMS/HCC)    • GERD (gastroesophageal reflux disease)    • Hiatal hernia    • Hyperlipidemia    • Hypertension    • Myocardial infarction (CMS/HCC)    • Non-functioning kidney     Left    • Peripheral vascular disease (CMS/HCC)    • Renal artery aneurysm (CMS/HCC)    • Stenosis of carotid artery 3/11/2020   • Systolic ejection murmur 3/27/2018       SURGICAL HISTORY:    Past Surgical History:   Procedure Laterality Date   • AORTAGRAM N/A 6/19/2018    Procedure: AORTAGRAM WITHOUT RUNOFFS WITH STENT, RIGHT FEM CUT DOWN;  Surgeon: Quang Mcallister MD;  Location: [place] [address];  Service: Vascular   • BREAST CYST EXCISION     • CARDIAC CATHETERIZATION      3/2019 -- 2 stents    • CHOLECYSTECTOMY     • CORONARY STENT PLACEMENT     • FEMORAL FEMORAL BYPASS     • HEMORROIDECTOMY     • HYSTERECTOMY         SOCIAL HISTORY:    Social History     Socioeconomic History   • Marital status:      Spouse name: Not on file   • Number of children: 3   • Years of education: Not on file   • Highest education level: Not on file   Occupational History   • Occupation: [employer]     Comment: Disabled-PVD   Tobacco Use   • Smoking status: Former Smoker     Packs/day: 0.75     Years: 40.00     Pack years: 30.00     Types: Cigarettes, Electronic Cigarette   • Smokeless tobacco: Never Used   Substance and Sexual Activity   • Alcohol use: No   • Drug use: No   • Sexual activity: Defer   Social History Narrative     "Lives in [place] alone.        FAMILY HISTORY:    Family History   Adopted: Yes   Problem Relation Age of Onset   • No Known Problems Mother    • No Known Problems Father        Review of Systems   Constitutional: Positive for fatigue.   HENT: Negative.    Eyes: Positive for visual disturbance (reading glasses).   Respiratory: Negative.    Cardiovascular: Positive for chest pain (occas.), palpitations and leg swelling (occas.).   Gastrointestinal: Negative.  Negative for blood in stool (no melena,hematuria,hemoptysis,hematochezia).   Endocrine: Negative.    Genitourinary: Negative.    Musculoskeletal: Positive for arthralgias and myalgias.        Denies leg cramps with ambulation   Skin: Negative.    Allergic/Immunologic: Positive for environmental allergies.   Neurological: Positive for light-headedness.        Denies stroke like sx's   Hematological: Negative.    Psychiatric/Behavioral: Positive for sleep disturbance.       VISIT VITALS:  Vitals:    05/19/20 0957   BP: 149/76   BP Location: Left arm   Patient Position: Sitting   Pulse: 60   SpO2: 96%   Weight: 84.9 kg (187 lb 3.2 oz)   Height: 162.6 cm (64.02\")      /76 (BP Location: Left arm, Patient Position: Sitting)   Pulse 60   Ht 162.6 cm (64.02\")   Wt 84.9 kg (187 lb 3.2 oz)   SpO2 96%   BMI 32.12 kg/m²     RECENT LABS:    Objective       Physical Exam   Constitutional: She is oriented to person, place, and time. She appears well-developed and well-nourished. No distress.   HENT:   Head: Normocephalic and atraumatic.   Eyes: Pupils are equal, round, and reactive to light. Conjunctivae and EOM are normal.   Neck: Normal range of motion. Neck supple. No hepatojugular reflux and no JVD present. Carotid bruit is present (late systolic bilat.. bruit's, louder on left). No tracheal deviation present.   Nl. Carotid upstrokes   Cardiovascular: Normal rate, regular rhythm, S1 normal, S2 normal and intact distal pulses. Exam reveals gallop and S4 " (soft). Exam reveals no S3 and no friction rub.   No murmur heard.  Pulses:       Radial pulses are 2+ on the right side, and 2+ on the left side.   Pulmonary/Chest: Effort normal. She has decreased breath sounds (mod. , partically at apex, but bilaterally). She has no wheezes. She has no rhonchi. She has no rales.   Abdominal: Soft. Bowel sounds are normal. She exhibits no distension, no abdominal bruit and no mass. There is no tenderness. There is no rebound and no guarding.   No organomegaly   Musculoskeletal: Normal range of motion. She exhibits no edema, tenderness or deformity.   LLE, no edema, palpable pedal pulses, nl. Cap. refill  RLE, no edema, palpable pedal pulses, nl. Cap. refill   Neurological: She is alert and oriented to person, place, and time.   Skin: Skin is warm and dry. No rash noted. No erythema. No pallor.   Psychiatric: She has a normal mood and affect. Her behavior is normal. Judgment and thought content normal.   Nursing note and vitals reviewed.      Procedures      Assessment/Plan   #1.  Coronary artery disease.  Stress testing was ordered previously but I do not feel that that is of paramount importance at this time in the absence of ischemic symptoms.  I have seen Ms. Franco for years and feel comfortable that she can discern angina from non-anginal pain.  However I did caution the patient today to report immediately any change in her current pattern of chest discomfort which is non-positional nonexertional and not accompanied by nausea, diaphoresis, or shortness of breath.    2.  Peripheral arterial disease.  I think the patient is experiencing claudication in the gluteus muscles but she describes no calf claudication.  We discussed a regular and gradually progressive walking program.  As the patient has palpable pulses and no evidence of impaired distal flow, I think that walking would be a first step in further evaluation despite known disease and prior femorofemoral bypass.  I do  believe the patient needs repeat carotid duplex study because of bruits described above, and repeat abdominal aortic ultrasound to assess infrarenal abdominal aortic aneurysm.    3.  Systemic hypertension.  The patient will continue to follow blood pressures and report to Dr. Otoole.    4.  Diabetes being managed through Dr. Otoole office.    5.  I asked Ms. Franco to follow with Dr. Otoole as described above.  We will plan on seeing her in 6 to 12 months or on a as needed basis for symptoms or test findings as discussed in detail today.   Diagnosis Plan   1. AAA (abdominal aortic aneurysm) without rupture (CMS/HCC)     2. Abnormal EKG     3. Carotid bruit, unspecified laterality     4. Coronary artery disease involving native coronary artery of native heart without angina pectoris     5. Decreased pedal pulses     6. Essential hypertension     7. Mixed hyperlipidemia     8. PAD (peripheral artery disease) (CMS/HCC)     9. PVD (peripheral vascular disease) with claudication (CMS/HCC)     10. Renal artery stenosis (CMS/HCC)     11. Stenosis of carotid artery, unspecified laterality     12. Systolic ejection murmur     13. Shortness of breath     14. Precordial pain     15. Claudication (CMS/HCC)     16. S/P cardiac cath         No follow-ups on file.         Lyndsay Franco  reports that she has quit smoking. Her smoking use included cigarettes and electronic cigarette. She has a 30.00 pack-year smoking history. She has never used smokeless tobacco.. I have educated her on the risk of diseases from using tobacco products such as cancer, COPD and heart diease.               Patient's Body mass index is 32.12 kg/m². BMI is above normal parameters. Recommendations include: educational material and referral to primary care.       Josselin Fernandez LPN    Scribed for Dr. Quang Suggs by Josselin Fernandez LPN May 19, 2020 09:57         Electronically signed by:            This note is dictated utilizing voice recognition  software.  Although this record has been proof read, transcriptional errors may still be present. If questions occur regarding the content of this record please do not hesitate to call our office.

## 2020-06-08 ENCOUNTER — HOSPITAL ENCOUNTER (OUTPATIENT)
Dept: CARDIOLOGY | Facility: HOSPITAL | Age: 70
Discharge: HOME | End: 2020-06-08

## 2020-06-08 ENCOUNTER — HOSPITAL ENCOUNTER (OUTPATIENT)
Dept: CARDIOLOGY | Facility: HOSPITAL | Age: 70
Discharge: HOME | End: 2020-06-08
Admitting: PHYSICIAN ASSISTANT

## 2020-06-08 DIAGNOSIS — I65.29 STENOSIS OF CAROTID ARTERY, UNSPECIFIED LATERALITY: ICD-10-CM

## 2020-06-08 DIAGNOSIS — I73.9 PAD (PERIPHERAL ARTERY DISEASE) (HCC): ICD-10-CM

## 2020-06-08 DIAGNOSIS — E78.2 MIXED HYPERLIPIDEMIA: ICD-10-CM

## 2020-06-08 DIAGNOSIS — R09.89 CAROTID BRUIT, UNSPECIFIED LATERALITY: ICD-10-CM

## 2020-06-08 DIAGNOSIS — R01.1 SYSTOLIC EJECTION MURMUR: ICD-10-CM

## 2020-06-08 DIAGNOSIS — I25.10 CORONARY ARTERY DISEASE INVOLVING NATIVE CORONARY ARTERY OF NATIVE HEART WITHOUT ANGINA PECTORIS: ICD-10-CM

## 2020-06-08 DIAGNOSIS — I73.9 PVD (PERIPHERAL VASCULAR DISEASE) WITH CLAUDICATION (HCC): ICD-10-CM

## 2020-06-08 DIAGNOSIS — R06.02 SHORTNESS OF BREATH: ICD-10-CM

## 2020-06-08 DIAGNOSIS — R07.2 PRECORDIAL PAIN: ICD-10-CM

## 2020-06-08 DIAGNOSIS — I71.40 AAA (ABDOMINAL AORTIC ANEURYSM) WITHOUT RUPTURE (HCC): ICD-10-CM

## 2020-06-08 DIAGNOSIS — I10 ESSENTIAL HYPERTENSION: ICD-10-CM

## 2020-06-08 DIAGNOSIS — I73.9 CLAUDICATION (HCC): ICD-10-CM

## 2020-06-08 DIAGNOSIS — I70.1 RENAL ARTERY STENOSIS (HCC): ICD-10-CM

## 2020-06-08 PROCEDURE — 93880 EXTRACRANIAL BILAT STUDY: CPT

## 2020-06-08 PROCEDURE — 93979 VASCULAR STUDY: CPT

## 2020-06-08 PROCEDURE — 93306 TTE W/DOPPLER COMPLETE: CPT

## 2020-06-08 PROCEDURE — 93306 TTE W/DOPPLER COMPLETE: CPT | Performed by: INTERNAL MEDICINE

## 2020-06-08 PROCEDURE — 93979 VASCULAR STUDY: CPT | Performed by: INTERNAL MEDICINE

## 2020-06-08 PROCEDURE — 93880 EXTRACRANIAL BILAT STUDY: CPT | Performed by: INTERNAL MEDICINE

## 2020-06-15 ENCOUNTER — HOSPITAL ENCOUNTER (OUTPATIENT)
Dept: CARDIOLOGY | Facility: HOSPITAL | Age: 70
Discharge: HOME | End: 2020-06-15

## 2020-06-15 DIAGNOSIS — I25.10 CORONARY ARTERY DISEASE INVOLVING NATIVE CORONARY ARTERY OF NATIVE HEART WITHOUT ANGINA PECTORIS: ICD-10-CM

## 2020-06-15 DIAGNOSIS — R06.02 SHORTNESS OF BREATH: ICD-10-CM

## 2020-06-15 DIAGNOSIS — R07.2 PRECORDIAL PAIN: ICD-10-CM

## 2020-06-15 PROCEDURE — 78452 HT MUSCLE IMAGE SPECT MULT: CPT | Performed by: INTERNAL MEDICINE

## 2020-06-15 PROCEDURE — 0 TECHNETIUM SESTAMIBI: Performed by: INTERNAL MEDICINE

## 2020-06-15 PROCEDURE — 93016 CV STRESS TEST SUPVJ ONLY: CPT | Performed by: NURSE PRACTITIONER

## 2020-06-15 PROCEDURE — 25010000002 REGADENOSON 0.4 MG/5ML SOLUTION: Performed by: INTERNAL MEDICINE

## 2020-06-15 PROCEDURE — A9500 TC99M SESTAMIBI: HCPCS | Performed by: INTERNAL MEDICINE

## 2020-06-15 PROCEDURE — 93018 CV STRESS TEST I&R ONLY: CPT | Performed by: INTERNAL MEDICINE

## 2020-06-15 PROCEDURE — 93017 CV STRESS TEST TRACING ONLY: CPT

## 2020-06-15 PROCEDURE — 78452 HT MUSCLE IMAGE SPECT MULT: CPT

## 2020-06-15 RX ADMIN — TECHNETIUM TC 99M SESTAMIBI 1 DOSE: 1 INJECTION INTRAVENOUS at 08:28

## 2020-06-15 RX ADMIN — REGADENOSON 0.4 MG: 0.08 INJECTION, SOLUTION INTRAVENOUS at 08:28

## 2020-06-15 RX ADMIN — TECHNETIUM TC 99M SESTAMIBI 1 DOSE: 1 INJECTION INTRAVENOUS at 08:27

## 2020-06-16 ENCOUNTER — TELEPHONE (OUTPATIENT)
Dept: CARDIOLOGY | Facility: CLINIC | Age: 70
End: 2020-06-16

## 2020-06-16 LAB
BH CV STRESS COMMENTS STAGE 1: NORMAL
BH CV STRESS DOSE REGADENOSON STAGE 1: 0.4
BH CV STRESS DURATION MIN STAGE 1: 0
BH CV STRESS DURATION SEC STAGE 1: 10
BH CV STRESS PROTOCOL 1: NORMAL
BH CV STRESS RECOVERY BP: NORMAL MMHG
BH CV STRESS RECOVERY HR: 78 BPM
BH CV STRESS STAGE 1: 1
MAXIMAL PREDICTED HEART RATE: 152 BPM
PERCENT MAX PREDICTED HR: 55.92 %
STRESS BASELINE BP: NORMAL MMHG
STRESS BASELINE HR: 77 BPM
STRESS PERCENT HR: 66 %
STRESS POST PEAK BP: NORMAL MMHG
STRESS POST PEAK HR: 85 BPM
STRESS TARGET HR: 129 BPM

## 2020-06-17 ENCOUNTER — TELEPHONE (OUTPATIENT)
Dept: CARDIOLOGY | Facility: CLINIC | Age: 70
End: 2020-06-17

## 2020-06-17 NOTE — TELEPHONE ENCOUNTER
Patient is aware of echo results. Follow up scheduled 7/1/2020. Courtney Hicks MA        ----- Message from MELISSA Webb sent at 6/17/2020 12:02 PM EDT -----  1 month follow-up or sooner if she is having symptoms of chest pain.  Ejection fraction seems slightly reduced      Result Text     1.  No scintigraphic evidence of ischemia.     2.  Moderately depressed post-rest ejection fraction of 44% with mild global hypokinesis and moderate anteroseptal and septal hypokinesis.     3.  Borderline transient ischemic dilation ratio 1.18.  No evidence of increased lung uptake of radiopharmaceutical.

## 2020-06-22 LAB
ABDOMINAL DIST AORTA AP: 3.4 CM
ABDOMINAL DIST AORTA TRANS: 3.4 CM
ABDOMINAL DIST AORTA VEL: 26 CM/S
ABDOMINAL LT COM ILIAC TRANS: 1.1 CM
ABDOMINAL MID AORTA AP: 2.3 CM
ABDOMINAL MID AORTA TRANS: 2.2 CM
ABDOMINAL MID AORTA VEL: 55 CM/S
ABDOMINAL PROX AORTA AP: 2.2 CM
ABDOMINAL PROX AORTA TRANS: 2.3 CM
ABDOMINAL PROX AORTA VEL: 52 CM/S
ABDOMINAL RT COM ILIAC TRANS: 0.9 CM
ABDOMINAL RT COM ILIAC VEL: 290 CM/S
BH CV ECHO MEAS - ACS: 2.2 CM
BH CV ECHO MEAS - AO MAX PG: 3.9 MMHG
BH CV ECHO MEAS - AO MEAN PG: 2 MMHG
BH CV ECHO MEAS - AO ROOT AREA (BSA CORRECTED): 1.8
BH CV ECHO MEAS - AO ROOT AREA: 9.3 CM^2
BH CV ECHO MEAS - AO ROOT DIAM: 3.5 CM
BH CV ECHO MEAS - AO V2 MAX: 99 CM/SEC
BH CV ECHO MEAS - AO V2 MEAN: 71.7 CM/SEC
BH CV ECHO MEAS - AO V2 VTI: 21.4 CM
BH CV ECHO MEAS - BSA(HAYCOCK): 2 M^2
BH CV ECHO MEAS - BSA(HAYCOCK): 2 M^2
BH CV ECHO MEAS - BSA: 1.9 M^2
BH CV ECHO MEAS - BSA: 1.9 M^2
BH CV ECHO MEAS - BZI_BMI: 32.1 KILOGRAMS/M^2
BH CV ECHO MEAS - BZI_BMI: 32.1 KILOGRAMS/M^2
BH CV ECHO MEAS - BZI_METRIC_HEIGHT: 162.6 CM
BH CV ECHO MEAS - BZI_METRIC_HEIGHT: 162.6 CM
BH CV ECHO MEAS - BZI_METRIC_WEIGHT: 84.8 KG
BH CV ECHO MEAS - BZI_METRIC_WEIGHT: 84.8 KG
BH CV ECHO MEAS - DIST AO DIAM: 3.4 CM
BH CV ECHO MEAS - EDV(CUBED): 104.5 ML
BH CV ECHO MEAS - EDV(MOD-SP4): 73.6 ML
BH CV ECHO MEAS - EDV(TEICH): 102.9 ML
BH CV ECHO MEAS - EF(CUBED): 68.3 %
BH CV ECHO MEAS - EF(MOD-SP4): 43.5 %
BH CV ECHO MEAS - EF(TEICH): 59.9 %
BH CV ECHO MEAS - ESV(CUBED): 33.1 ML
BH CV ECHO MEAS - ESV(MOD-SP4): 41.6 ML
BH CV ECHO MEAS - ESV(TEICH): 41.3 ML
BH CV ECHO MEAS - FS: 31.8 %
BH CV ECHO MEAS - IVS/LVPW: 1.1
BH CV ECHO MEAS - IVSD: 1.7 CM
BH CV ECHO MEAS - LA DIMENSION: 3.9 CM
BH CV ECHO MEAS - LA/AO: 1.1
BH CV ECHO MEAS - LV DIASTOLIC VOL/BSA (35-75): 38.7 ML/M^2
BH CV ECHO MEAS - LV IVRT: 0.11 SEC
BH CV ECHO MEAS - LV MASS(C)D: 336.5 GRAMS
BH CV ECHO MEAS - LV MASS(C)DI: 177 GRAMS/M^2
BH CV ECHO MEAS - LV SYSTOLIC VOL/BSA (12-30): 21.9 ML/M^2
BH CV ECHO MEAS - LVIDD: 4.7 CM
BH CV ECHO MEAS - LVIDS: 3.2 CM
BH CV ECHO MEAS - LVLD AP4: 6.9 CM
BH CV ECHO MEAS - LVLS AP4: 5.9 CM
BH CV ECHO MEAS - LVOT AREA (M): 4.2 CM^2
BH CV ECHO MEAS - LVOT AREA: 4.2 CM^2
BH CV ECHO MEAS - LVOT DIAM: 2.3 CM
BH CV ECHO MEAS - LVPWD: 1.6 CM
BH CV ECHO MEAS - MV A MAX VEL: 85.4 CM/SEC
BH CV ECHO MEAS - MV DEC SLOPE: 193 CM/SEC^2
BH CV ECHO MEAS - MV E MAX VEL: 51.9 CM/SEC
BH CV ECHO MEAS - MV E/A: 0.61
BH CV ECHO MEAS - RVDD: 3.2 CM
BH CV ECHO MEAS - SI(AO): 105.2 ML/M^2
BH CV ECHO MEAS - SI(CUBED): 37.6 ML/M^2
BH CV ECHO MEAS - SI(MOD-SP4): 16.8 ML/M^2
BH CV ECHO MEAS - SI(TEICH): 32.4 ML/M^2
BH CV ECHO MEAS - SV(AO): 200.1 ML
BH CV ECHO MEAS - SV(CUBED): 71.4 ML
BH CV ECHO MEAS - SV(MOD-SP4): 32 ML
BH CV ECHO MEAS - SV(TEICH): 61.6 ML
BH CV VAS SMA 1ST PP TIME: 15 MIN
BH CV VAS SMA 2ND PP TIME: 30 MIN
BH CV VAS SMA 3RD PP TIME: 45 MIN
BH CV XLRA MEAS LEFT DIST CCA EDV: 11 CM/SEC
BH CV XLRA MEAS LEFT DIST CCA PSV: 49 CM/SEC
BH CV XLRA MEAS LEFT DIST ICA EDV: 47 CM/SEC
BH CV XLRA MEAS LEFT DIST ICA PSV: 137 CM/SEC
BH CV XLRA MEAS LEFT ICA/CCA RATIO: 5.4
BH CV XLRA MEAS LEFT MID ICA EDV: 35 CM/SEC
BH CV XLRA MEAS LEFT MID ICA PSV: 152 CM/SEC
BH CV XLRA MEAS LEFT PROX CCA EDV: 0 CM/SEC
BH CV XLRA MEAS LEFT PROX CCA PSV: 112 CM/SEC
BH CV XLRA MEAS LEFT PROX ECA EDV: 0 CM/SEC
BH CV XLRA MEAS LEFT PROX ECA PSV: 76 CM/SEC
BH CV XLRA MEAS LEFT PROX ICA EDV: 92 CM/SEC
BH CV XLRA MEAS LEFT PROX ICA PSV: 267 CM/SEC
BH CV XLRA MEAS LEFT VERTEBRAL A EDV: 17 CM/SEC
BH CV XLRA MEAS LEFT VERTEBRAL A PSV: 50 CM/SEC
BH CV XLRA MEAS RIGHT DIST CCA EDV: 14 CM/SEC
BH CV XLRA MEAS RIGHT DIST CCA PSV: 65 CM/SEC
BH CV XLRA MEAS RIGHT DIST ICA EDV: 29 CM/SEC
BH CV XLRA MEAS RIGHT DIST ICA PSV: 75 CM/SEC
BH CV XLRA MEAS RIGHT ICA/CCA RATIO: 1.5
BH CV XLRA MEAS RIGHT MID ICA EDV: 18 CM/SEC
BH CV XLRA MEAS RIGHT MID ICA PSV: 66 CM/SEC
BH CV XLRA MEAS RIGHT PROX CCA EDV: 9 CM/SEC
BH CV XLRA MEAS RIGHT PROX CCA PSV: 87 CM/SEC
BH CV XLRA MEAS RIGHT PROX ECA EDV: 0 CM/SEC
BH CV XLRA MEAS RIGHT PROX ECA PSV: 94 CM/SEC
BH CV XLRA MEAS RIGHT PROX ICA EDV: 27 CM/SEC
BH CV XLRA MEAS RIGHT PROX ICA PSV: 94 CM/SEC
BH CV XLRA MEAS RIGHT VERTEBRAL A EDV: 17 CM/SEC
BH CV XLRA MEAS RIGHT VERTEBRAL A PSV: 45 CM/SEC
MAXIMAL PREDICTED HEART RATE: 152 BPM
STRESS TARGET HR: 129 BPM

## 2020-06-23 ENCOUNTER — TELEPHONE (OUTPATIENT)
Dept: CARDIOLOGY | Facility: CLINIC | Age: 70
End: 2020-06-23

## 2020-06-23 DIAGNOSIS — I73.9 PAD (PERIPHERAL ARTERY DISEASE) (HCC): ICD-10-CM

## 2020-06-23 DIAGNOSIS — I25.10 CORONARY ARTERY DISEASE INVOLVING NATIVE CORONARY ARTERY OF NATIVE HEART WITHOUT ANGINA PECTORIS: Primary | ICD-10-CM

## 2020-06-23 DIAGNOSIS — R93.89 ABNORMAL CAROTID ULTRASOUND: ICD-10-CM

## 2020-06-23 DIAGNOSIS — I73.9 PVD (PERIPHERAL VASCULAR DISEASE) WITH CLAUDICATION (HCC): ICD-10-CM

## 2020-06-23 DIAGNOSIS — R09.89 CAROTID BRUIT, UNSPECIFIED LATERALITY: ICD-10-CM

## 2020-06-23 DIAGNOSIS — I65.29 STENOSIS OF CAROTID ARTERY, UNSPECIFIED LATERALITY: ICD-10-CM

## 2020-06-23 NOTE — TELEPHONE ENCOUNTER
Patient is aware. She will keep follow up next week as she is staying fatigued. Courtney Hicks MA      Left message for patient to return call. Courtney Hicks MA    Follow up scheduled 7/1/2020.     ----- Message from MELISSA Webb sent at 6/22/2020  1:17 PM EDT -----  Routine follow-up    Result Text     1.  3.4 cm infrarenal abdominal aortic aneurysm without significant thrombus and with no evidence of dissection.     2.  Possible occlusion of the left common iliac artery.

## 2020-06-23 NOTE — TELEPHONE ENCOUNTER
Finally able to reach Mrs. Franco this am. Echo results briefly discussed. Aware carotid U/S indicates 50-75% stenosis to LICA and v/o per Fuentes Agustin, PAC to schedule CTA carotids, BMP prior for renal eval. Ok with test being scheduled at [facility]. WADE Hoang aware and to call hre with appt. Date and time.Patient denied contrast allergy. PH,LPN            ----- Message from Quang Suggs MD sent at 6/22/2020  1:32 PM EDT -----  Keep 7-1-2020 f/u appt.

## 2020-07-01 ENCOUNTER — OFFICE VISIT (OUTPATIENT)
Dept: CARDIOLOGY | Facility: CLINIC | Age: 70
End: 2020-07-01

## 2020-07-01 VITALS
DIASTOLIC BLOOD PRESSURE: 78 MMHG | HEIGHT: 64 IN | WEIGHT: 186.2 LBS | SYSTOLIC BLOOD PRESSURE: 171 MMHG | HEART RATE: 78 BPM | OXYGEN SATURATION: 96 % | BODY MASS INDEX: 31.79 KG/M2

## 2020-07-01 DIAGNOSIS — R07.2 PRECORDIAL PAIN: ICD-10-CM

## 2020-07-01 DIAGNOSIS — R10.11 RUQ PAIN: ICD-10-CM

## 2020-07-01 DIAGNOSIS — R06.02 SHORTNESS OF BREATH: ICD-10-CM

## 2020-07-01 DIAGNOSIS — R93.89 ABNORMAL CAROTID ULTRASOUND: ICD-10-CM

## 2020-07-01 DIAGNOSIS — I71.40 AAA (ABDOMINAL AORTIC ANEURYSM) WITHOUT RUPTURE (HCC): ICD-10-CM

## 2020-07-01 DIAGNOSIS — I70.1 RENAL ARTERY STENOSIS (HCC): Primary | ICD-10-CM

## 2020-07-01 DIAGNOSIS — R09.89 CAROTID BRUIT, UNSPECIFIED LATERALITY: ICD-10-CM

## 2020-07-01 DIAGNOSIS — I25.10 CORONARY ARTERY DISEASE INVOLVING NATIVE CORONARY ARTERY OF NATIVE HEART WITHOUT ANGINA PECTORIS: ICD-10-CM

## 2020-07-01 DIAGNOSIS — I65.29 STENOSIS OF CAROTID ARTERY, UNSPECIFIED LATERALITY: ICD-10-CM

## 2020-07-01 DIAGNOSIS — I73.9 PAD (PERIPHERAL ARTERY DISEASE) (HCC): ICD-10-CM

## 2020-07-01 DIAGNOSIS — I73.9 PVD (PERIPHERAL VASCULAR DISEASE) WITH CLAUDICATION (HCC): ICD-10-CM

## 2020-07-01 PROCEDURE — 99214 OFFICE O/P EST MOD 30 MIN: CPT | Performed by: PHYSICIAN ASSISTANT

## 2020-07-01 NOTE — PROGRESS NOTES
Problem list     Subjective   Lyndsay Franco is a 70 y.o. female     Chief Complaint   Patient presents with   • Follow-up   PROBLEM LIST:         1.  Coronary artery disease  1.1 history of multiple stenting procedures in 2004 and  2016, inadequate data  1.2 stress test April 2018 demonstrates no evidence of ischemia and preserved LV function  1.31.  Stress test Scintigraphy demonstrates ischemia involving the inferoseptal segment, the basilar inferolateral segment, and the entire inferior wall.  1.4 cardiac catheterization March 2019 demonstrated chronic total occlusion of the RCA.  Nonobstructive LAD with high-grade stenosis involving the circumflex and obtuse marginal.  Angioplasty and stenting of the LAD and circumflex and stenting of the obtuse marginal.  Medical management recommended in recanalization of the  if symptoms did not improve  1.5 Successful  PCI RCA laser atherectomy and KRISTINE x 1 via LRA per Dr. Kwong at [place] on 8-14-19  2.  Peripheral vascular disease  2.1 history of iliac stenting bilaterally    2.2 follow-up femorofemoral bypass by Dr. Dupont at [facility] in [place]  2.2 follow-up CT angiogram May 2018 demonstrates occluded iliacs with femorofemoral bypass noted but no discussion on report in regards to patency or disease  2.3 intermittent claudication  2.4 questionable left renal artery stenosis  2.5 right renal artery stenting June 2018 by Dr. Mcallister and Dr. Dupont with no significant disease of the lower extremities identified  3.  Labile hypertension  4.  Infrarenal abdominal aortic aneurysm measuring 3 cm by CT scan May 2018  4.1 infrarenal abdominal aortic aneurysm measured by duplex at 3.4 cm  5.  Type 2 diabetes mellitus  6.  Dyslipidemia  7.  Chronic tobacco use  8.  Carotid artery stenosis  8.1 carotid duplex March 2019 demonstrates 50% left internal carotid artery stenosis on the left and less than 50% on the right with antegrade vertebral flow  I.2 carotid  duplex 2020 June 2020 with a 50 to 75% left internal carotid artery stenosis.    HPI    Patient is a 70-year-old female that presents to the office for evaluation.  She is here today to review carotid duplex.  She also had echocardiogram to reevaluate LV systolic and diastolic function.    She feels well.  She does not describe any significant discomfort.  She is quite cognizant of her angina.  She experiences occasional discomfort but nothing like what she felt previous to stenting.  Patient also has history of dyspnea but mild to moderate at baseline.  She does not describe progressive dyspnea.  No PND orthopnea.    She does not describe to me palpitations.  She has no dizziness presyncope or syncope associated.  Patient has history of carotid artery stenosis.  Recent carotid duplex with findings as above.  She does not describe any symptoms of cerebral embolic events.  Otherwise patient is doing well      Current Outpatient Medications on File Prior to Visit   Medication Sig Dispense Refill   • aspirin 81 MG tablet Take 81 mg by mouth 2 (Two) Times a Day. 30 tablet 11   • atorvastatin (LIPITOR) 40 MG tablet TAKE 1 TABLET BY MOUTH DAILY. 30 tablet 11   • cetirizine (zyrTEC) 10 MG tablet Take 10 mg by mouth Daily.     • CHANTIX CONTINUING MONTH KARIE 1 MG tablet AS PRESCRIBED 56 tablet 2   • cloNIDine (CATAPRES) 0.3 MG tablet Prn for systolic > 200     • clopidogrel (PLAVIX) 75 MG tablet Take 75 mg by mouth Daily.     • escitalopram (LEXAPRO) 10 MG tablet Daily As Needed.  5   • furosemide (LASIX) 20 MG tablet Take 20 mg by mouth As Needed.     • HYDROcodone-acetaminophen (NORCO)  MG per tablet prn     • isosorbide mononitrate (IMDUR) 60 MG 24 hr tablet Take 1 tablet by mouth Daily. 30 tablet 6   • losartan (COZAAR) 100 MG tablet Take 100 mg by mouth Daily.     • metFORMIN (GLUCOPHAGE) 500 MG tablet 2 (Two) Times a Day.     • nitroglycerin (NITROSTAT) 0.4 MG SL tablet Take 1 tablet by mouth As Needed for Chest  Pain. 25 tablet 1   • pantoprazole (PROTONIX) 40 MG EC tablet Daily.     • potassium chloride (K-DUR,KLOR-CON) 10 MEQ CR tablet Take 10 mEq by mouth Daily.     • promethazine (PHENERGAN) 12.5 MG tablet As Needed.     • senna (SENOKOT) 8.6 MG tablet tablet Take  by mouth Every Night.     • traZODone (DESYREL) 50 MG tablet Take 50 mg by mouth Every Night.     • ALPRAZolam (XANAX) 0.5 MG tablet Take 0.5 mg by mouth 2 (Two) Times a Day As Needed.     • carvedilol (COREG) 25 MG tablet Take 1 tablet by mouth 3 (Three) Times a Day. 90 tablet 11     No current facility-administered medications on file prior to visit.        Patient has no known allergies.    Past Medical History:   Diagnosis Date   • Abnormal EKG 3/6/2019   • Anxiety    • Arthritis    • Coronary artery disease    • Depression    • Diabetes mellitus (CMS/[facility])    • GERD (gastroesophageal reflux disease)    • Hiatal hernia    • Hyperlipidemia    • Hypertension    • Myocardial infarction (CMS/[facility])    • Non-functioning kidney     Left    • Peripheral vascular disease (CMS/[facility])    • Renal artery aneurysm (CMS/[facility])    • Stenosis of carotid artery 3/11/2020   • Systolic ejection murmur 3/27/2018       Social History     Socioeconomic History   • Marital status:      Spouse name: Not on file   • Number of children: 3   • Years of education: Not on file   • Highest education level: Not on file   Occupational History   • Occupation: [employer]     Comment: Disabled-PVD   Tobacco Use   • Smoking status: Former Smoker     Packs/day: 0.75     Years: 40.00     Pack years: 30.00     Types: Cigarettes, Electronic Cigarette   • Smokeless tobacco: Never Used   Substance and Sexual Activity   • Alcohol use: No   • Drug use: No   • Sexual activity: Defer   Social History Narrative    Lives in [facility].        Family History   Adopted: Yes   Problem Relation Age of Onset   • No Known Problems Mother    • No Known Problems Father        Review  "of Systems   Constitutional: Positive for fatigue (States her BS bottomed out, so she's not feeling well ).   HENT: Negative for congestion, rhinorrhea and sore throat.    Eyes: Positive for visual disturbance (reading glasses).   Respiratory: Positive for chest tightness (sometimes, mainly in the evening ) and shortness of breath.    Cardiovascular: Positive for chest pain (Denies CP). Negative for palpitations and leg swelling.   Gastrointestinal: Positive for nausea (x 1 month, states she gets colonosocopy and endoscopy this month ) and vomiting. Negative for abdominal pain, blood in stool, constipation and diarrhea.   Endocrine: Negative for cold intolerance and heat intolerance.   Genitourinary: Negative for difficulty urinating, dysuria, frequency, hematuria and urgency.   Musculoskeletal: Positive for arthralgias. Negative for back pain and neck pain.   Skin: Negative for color change, rash and wound.   Allergic/Immunologic: Positive for environmental allergies (seasonal ). Negative for food allergies.   Neurological: Positive for weakness (knees- thinks it's from her low BS) and light-headedness (occasionally when BP is too high or too low ). Negative for dizziness, syncope, numbness and headaches.   Hematological: Does not bruise/bleed easily.   Psychiatric/Behavioral: Positive for sleep disturbance (Issues falling asleep and staying asleep, denies SoA at HS ).   All other systems reviewed and are negative.      Objective   Vitals:    07/01/20 1545   BP: 171/78   Pulse: 78   SpO2: 96%   Weight: 84.5 kg (186 lb 3.2 oz)   Height: 162.6 cm (64\")      /78   Pulse 78   Ht 162.6 cm (64\")   Wt 84.5 kg (186 lb 3.2 oz)   SpO2 96%   BMI 31.96 kg/m²     Lab Results (most recent)     None          Physical Exam   Constitutional: She is oriented to person, place, and time. She appears well-developed and well-nourished. No distress.   HENT:   Head: Normocephalic and atraumatic.   Eyes: Conjunctivae are normal. " Right eye exhibits no discharge. Left eye exhibits no discharge. No scleral icterus.   Neck: No JVD present. Carotid bruit is present.   Cardiovascular: Normal rate, regular rhythm and normal heart sounds. Exam reveals no gallop and no friction rub.   No murmur heard.  Pulmonary/Chest: Effort normal and breath sounds normal. No respiratory distress. She has no wheezes. She has no rales. She exhibits no tenderness.   Musculoskeletal: Normal range of motion. She exhibits no edema.   Neurological: She is alert and oriented to person, place, and time. No cranial nerve deficit.   Skin: Skin is warm and dry. No rash noted. No erythema. No pallor.   Psychiatric: She has a normal mood and affect. Her behavior is normal.   Nursing note and vitals reviewed.      Procedure   Procedures       Assessment/Plan     Problems Addressed this Visit        Cardiovascular and Mediastinum    Coronary artery disease involving native coronary artery of native heart without angina pectoris    PAD (peripheral artery disease) (CMS/HCC)    Carotid bruit    Renal artery stenosis (CMS/HCC) - Primary    Relevant Orders    CT angiogram abdomen w wo contrast    PVD (peripheral vascular disease) with claudication (CMS/HCC)    Relevant Orders    CT angiogram abdomen w wo contrast    Stenosis of carotid artery    AAA (abdominal aortic aneurysm) without rupture (CMS/HCC)    Relevant Orders    CT angiogram abdomen w wo contrast       Respiratory    Shortness of breath       Nervous and Auditory    Chest pain    RUQ pain    Relevant Orders    CT angiogram abdomen w wo contrast      Other Visit Diagnoses     Abnormal carotid ultrasound              Recommendation  1.  Patient with peripheral arterial disease with lower extremity disease followed closely.  She has had intervention and continues to do well.  We will monitor    2.  A degree of labile hypertension noted.  Her medication list here does not seem to match what she is taking at home.  I  instructed her to call us as we will likely need to make some adjustments    3.  Patient with carotid artery stenosis with most recent CT scan demonstrating 50 to 70% left internal carotid artery stenosis.  Will schedule CTA of the neck    4.  Infrarenal abdominal aortic aneurysm.  Patient had increased in size from 3 cm in 2018 to 3.4 cm in 2020.  This will have to closely be monitored.  We will need to repeat scan in a few months to ensure stability    5.  Otherwise she is doing well.  Last lipid parameters with an LDL of 71.  Triglycerides elevated.  She may need it would be a good candidate for Vascepa.  We will see her back for follow-up after testing.  Patient describes having abdominal issues.  She would like CT scan and has history of aneurysm and renal artery stenosis.  We will schedule per patient request.  We will see patient back for follow-up after testing.  Follow-up with primary as scheduled           Lyndsay Franco  reports that she has quit smoking. Her smoking use included cigarettes and electronic cigarette. She has a 30.00 pack-year smoking history. She has never used smokeless tobacco.     Patient's Body mass index is 31.96 kg/m². BMI is above normal parameters. Recommendations include: educational material.       Electronically signed by:

## 2020-07-01 NOTE — PATIENT INSTRUCTIONS
How to Quarantine at Home  Information for Patients and Families    These instructions are for people with confirmed or suspected COVID-19 who do not need to be hospitalized and those with confirmed COVID-19 who were hospitalized and discharged to care for themselves at home.    If you were tested through the Health Department  The Health Department will monitor your wellbeing.  If it is determined that you do not need to be hospitalized and can be isolated at home, you will be monitored by staff from your local or state health department.     If you were tested through a Commercial Lab  You will need to monitor yourself and report changes in your symptoms to your doctor.  See the section below called Monitor Your Symptoms.    Follow these steps until a healthcare provider or local or state health department says you can return to your normal activities.    Stay home except to get medical care  • Restrict activities outside your home, except for getting medical care.   • Do not go to work, school, or public areas.   • Avoid using public transportation, ride-sharing, or taxis.    Separate yourself from other people and animals in your home  People  As much as possible, you should stay in a specific room and away from other people in your home. Also, you should use a separate bathroom, if available.    Animals  You should restrict contact with pets and other animals while you are sick with COVID-19, just like you would around other people. When possible, have another member of your household care for your animals while you are sick. If you are sick with COVID-19, avoid contact with your pet, including petting, snuggling, being kissed or licked, and sharing food. If you must care for your pet or be around animals while you are sick, wash your hands before and after you interact with pets and wear a facemask. See COVID-19 and Animals for more information.    Call ahead before visiting your doctor  If you have a medical  appointment, call the healthcare provider and tell them that you have or may have COVID-19. This information will help the healthcare provider’s office take steps to keep other people from getting infected or exposed.    Wear a facemask  You should wear a facemask when you are around other people (e.g., sharing a room or vehicle) or pets and before you enter a healthcare provider’s office.     If you are not able to wear a facemask (for example, because it causes trouble breathing), then people who live with you should not stay in the same room with you, or they should wear a facemask if they enter your room.    Cover your coughs and sneezes  • Cover your mouth and nose with a tissue when you cough or sneeze.   • Throw used tissues in a lined trash can.   • Immediately wash your hands with soap and water for at least 20 seconds or, if soap and water are not available, clean your hands with an alcohol-based hand  that contains at least 60% alcohol.    Clean your hands often  • Wash your hands often with soap and water for at least 20 seconds, especially after blowing your nose, coughing, or sneezing; going to the bathroom; and before eating or preparing food.     • If soap and water are not readily available, use an alcohol-based hand  with at least 60% alcohol, covering all surfaces of your hands and rubbing them together until they feel dry.    • Soap and water are the best option if hands are visibly dirty. Avoid touching your eyes, nose, and mouth with unwashed hands.    Avoid sharing personal household items  • You should not share dishes, drinking glasses, cups, eating utensils, towels, or bedding with other people or pets in your home.   • After using these items, they should be washed thoroughly with soap and water.    Clean all “high-touch” surfaces everyday  • High touch surfaces include counters, tabletops, doorknobs, bathroom fixtures, toilets, phones, keyboards, tablets, and bedside  tables.   • Also, clean any surfaces that may have blood, stool, or body fluids on them.   • Use a household cleaning spray or wipe, according to the label instructions. Labels contain instructions for safe and effective use of the cleaning product, including precautions you should take when applying the product, such as wearing gloves and making sure you have good ventilation during use of the product.    Monitor your symptoms  • Seek prompt medical attention if your illness is worsening (e.g., difficulty breathing).   • Before seeking care, call your healthcare provider and tell them that you have, or are being evaluated for, COVID-19.   • Put on a facemask before you enter the facility.     • These steps will help the healthcare provider’s office to keep other people in the office or waiting room from getting infected or exposed.   • Persons who are placed under active monitoring or facilitated self-monitoring should follow instructions provided by their local health department or occupational health professionals, as appropriate.  • If you have a medical emergency and need to call 911, notify the dispatch personnel that you have, or are being evaluated for COVID-19. If possible, put on a facemask before emergency medical services arrive.    Discontinuing home isolation  Patients with confirmed COVID-19 should remain under home isolation precautions until the risk of secondary transmission to others is thought to be low. The decision to discontinue home isolation precautions should be made on a case-by-case basis, in consultation with healthcare providers and state and local health departments.    The below content are for household members, intimate partners, and caregivers of a patient with symptomatic laboratory-confirmed COVID-19 or a patient under investigation:    Household members, intimate partners, and caregivers may have close contact with a person with symptomatic, laboratory-confirmed COVID-19 or a  person under investigation.     Close contacts should monitor their health; they should call their healthcare provider right away if they develop symptoms suggestive of COVID-19 (e.g., fever, cough, shortness of breath)     Close contacts should also follow these recommendations:  • Make sure that you understand and can help the patient follow their healthcare provider’s instructions for medication(s) and care. You should help the patient with basic needs in the home and provide support for getting groceries, prescriptions, and other personal needs.  • Monitor the patient’s symptoms. If the patient is getting sicker, call his or her healthcare provider and tell them that the patient has laboratory-confirmed COVID-19. This will help the healthcare provider’s office take steps to keep other people in the office or waiting room from getting infected. Ask the healthcare provider to call the local or [facility] health department for additional guidance. If the patient has a medical emergency and you need to call 911, notify the dispatch personnel that the patient has, or is being evaluated for COVID-19.  • Household members should stay in another room or be  from the patient as much as possible. Household members should use a separate bedroom and bathroom, if available.  • Prohibit visitors who do not have an essential need to be in the home.  • Household members should care for any pets in the home. Do not handle pets or other animals while sick.  For more information, see COVID-19 and Animals.  • Make sure that shared spaces in the home have good air flow, such as by an air conditioner or an opened window, weather permitting.  • Perform hand hygiene frequently. Wash your hands often with soap and water for at least 20 seconds or use an alcohol-based hand  that contains 60 to 95% alcohol, covering all surfaces of your hands and rubbing them together until they feel dry. Soap and water should be used  preferentially if hands are visibly dirty.  • Avoid touching your eyes, nose, and mouth with unwashed hands.  • The patient should wear a facemask when you are around other people. If the patient is not able to wear a facemask (for example, because it causes trouble breathing), you, as the caregiver, should wear a mask when you are in the same room as the patient.  • Wear a disposable facemask and gloves when you touch or have contact with the patient’s blood, stool, or body fluids, such as saliva, sputum, nasal mucus, vomit, or urine.   o Throw out disposable facemasks and gloves after using them. Do not reuse.  o When removing personal protective equipment, first remove and dispose of gloves. Then, immediately clean your hands with soap and water or alcohol-based hand . Next, remove and dispose of facemask, and immediately clean your hands again with soap and water or alcohol-based hand .  • Avoid sharing household items with the patient. You should not share dishes, drinking glasses, cups, eating utensils, towels, bedding, or other items. After the patient uses these items, you should wash them thoroughly (see below “Wash laundry thoroughly”).  • Clean all “high-touch” surfaces, such as counters, tabletops, doorknobs, bathroom fixtures, toilets, phones, keyboards, tablets, and bedside tables, every day. Also, clean any surfaces that may have blood, stool, or body fluids on them.   o Use a household cleaning spray or wipe, according to the label instructions. Labels contain instructions for safe and effective use of the cleaning product including precautions you should take when applying the product, such as wearing gloves and making sure you have good ventilation during use of the product.  • Wash laundry thoroughly.   o Immediately remove and wash clothes or bedding that have blood, stool, or body fluids on them.  o Wear disposable gloves while handling soiled items and keep soiled items away  from your body. Clean your hands (with soap and water or an alcohol-based hand ) immediately after removing your gloves.  o Read and follow directions on labels of laundry or clothing items and detergent. In general, using a normal laundry detergent according to washing machine instructions and dry thoroughly using the warmest temperatures recommended on the clothing label.  • Place all used disposable gloves, facemasks, and other contaminated items in a lined container before disposing of them with other household waste. Clean your hands (with soap and water or an alcohol-based hand ) immediately after handling these items. Soap and water should be used preferentially if hands are visibly dirty.  • Discuss any additional questions with your state or local health department or healthcare provider.    Adapted from information provided by the Centers for Disease Control and Prevention.  For more information, visit https://www.cdc.gov/coronavirus/2019-ncov/hcp/guidance-prevent-spread.htmlFat and Cholesterol Restricted Eating Plan  Getting too much fat and cholesterol in your diet may cause health problems. Choosing the right foods helps keep your fat and cholesterol at normal levels. This can keep you from getting certain diseases.  Your doctor may recommend an eating plan that includes:  · Total fat: ______% or less of total calories a day.  · Saturated fat: ______% or less of total calories a day.  · Cholesterol: less than _________mg a day.  · Fiber: ______g a day.  What are tips for following this plan?  Meal planning  · At meals, divide your plate into four equal parts:  ? Fill one-half of your plate with vegetables and green salads.  ? Fill one-fourth of your plate with whole grains.  ? Fill one-fourth of your plate with low-fat (lean) protein foods.  · Eat fish that is high in omega-3 fats at least two times a week. This includes mackerel, tuna, sardines, and salmon.  · Eat foods that are high  "in fiber, such as whole grains, beans, apples, broccoli, carrots, peas, and barley.  General tips    · Work with your doctor to lose weight if you need to.  · Avoid:  ? Foods with added sugar.  ? Fried foods.  ? Foods with partially hydrogenated oils.  · Limit alcohol intake to no more than 1 drink a day for nonpregnant women and 2 drinks a day for men. One drink equals 12 oz of beer, 5 oz of wine, or 1½ oz of hard liquor.  Reading food labels  · Check food labels for:  ? Trans fats.  ? Partially hydrogenated oils.  ? Saturated fat (g) in each serving.  ? Cholesterol (mg) in each serving.  ? Fiber (g) in each serving.  · Choose foods with healthy fats, such as:  ? Monounsaturated fats.  ? Polyunsaturated fats.  ? Omega-3 fats.  · Choose grain products that have whole grains. Look for the word \"whole\" as the first word in the ingredient list.  Cooking  · Cook foods using low-fat methods. These include baking, boiling, grilling, and broiling.  · Eat more home-cooked foods. Eat at restaurants and buffets less often.  · Avoid cooking using saturated fats, such as butter, cream, palm oil, palm kernel oil, and coconut oil.  Recommended foods    Fruits  · All fresh, canned (in natural juice), or frozen fruits.  Vegetables  · Fresh or frozen vegetables (raw, steamed, roasted, or grilled). Green salads.  Grains  · Whole grains, such as whole wheat or whole grain breads, crackers, cereals, and pasta. Unsweetened oatmeal, bulgur, barley, quinoa, or brown rice. Corn or whole wheat flour tortillas.  Meats and other protein foods  · Ground beef (85% or leaner), grass-fed beef, or beef trimmed of fat. Skinless chicken or turkey. Ground chicken or turkey. Pork trimmed of fat. All fish and seafood. Egg whites. Dried beans, peas, or lentils. Unsalted nuts or seeds. Unsalted canned beans. Nut butters without added sugar or oil.  Dairy  · Low-fat or nonfat dairy products, such as skim or 1% milk, 2% or reduced-fat cheeses, low-fat and " fat-free ricotta or cottage cheese, or plain low-fat and nonfat yogurt.  Fats and oils  · Tub margarine without trans fats. Light or reduced-fat mayonnaise and salad dressings. Avocado. Olive, canola, sesame, or safflower oils.  The items listed above may not be a complete list of foods and beverages you can eat. Contact a dietitian for more information.  Foods to avoid  Fruits  · Canned fruit in heavy syrup. Fruit in cream or butter sauce. Fried fruit.  Vegetables  · Vegetables cooked in cheese, cream, or butter sauce. Fried vegetables.  Grains  · White bread. White pasta. White rice. Cornbread. Bagels, pastries, and croissants. Crackers and snack foods that contain trans fat and hydrogenated oils.  Meats and other protein foods  · Fatty cuts of meat. Ribs, chicken wings, smith, sausage, bologna, salami, chitterlings, fatback, hot dogs, bratwurst, and packaged lunch meats. Liver and organ meats. Whole eggs and egg yolks. Chicken and turkey with skin. Fried meat.  Dairy  · Whole or 2% milk, cream, half-and-half, and cream cheese. Whole milk cheeses. Whole-fat or sweetened yogurt. Full-fat cheeses. Nondairy creamers and whipped toppings. Processed cheese, cheese spreads, and cheese curds.  Beverages  · Alcohol. Sugar-sweetened drinks such as sodas, lemonade, and fruit drinks.  Fats and oils  · Butter, stick margarine, lard, shortening, ghee, or smith fat. Coconut, palm kernel, and palm oils.  Sweets and desserts  · Corn syrup, sugars, honey, and molasses. Candy. Jam and jelly. Syrup. Sweetened cereals. Cookies, pies, cakes, donuts, muffins, and ice cream.  The items listed above may not be a complete list of foods and beverages you should avoid. Contact a dietitian for more information.  Summary  · Choosing the right foods helps keep your fat and cholesterol at normal levels. This can keep you from getting certain diseases.  · At meals, fill one-half of your plate with vegetables and green salads.  · Eat high-fiber  "foods, like whole grains, beans, apples, carrots, peas, and barley.  · Limit added sugar, saturated fats, alcohol, and fried foods.  This information is not intended to replace advice given to you by your health care provider. Make sure you discuss any questions you have with your health care provider.  Document Released: 06/18/2013 Document Revised: 08/21/2019 Document Reviewed: 09/04/2018  [employer] Patient Education © 2020 [employer]  BMI for Adults    Body mass index (BMI) is a number that is calculated from a person's weight and height. BMI may help to estimate how much of a person's weight is composed of fat. BMI can help identify those who may be at higher risk for certain medical problems.  How is BMI used with adults?  BMI is used as a screening tool to identify possible weight problems. It is used to check whether a person is obese, overweight, healthy weight, or underweight.  How is BMI calculated?  BMI measures your weight and compares it to your height. This can be done either in English ([place] or metric measurements. Note that charts are available to help you find your BMI quickly and easily without having to do these calculations yourself.  To calculate your BMI in English ([place] measurements, your health care provider will:  1. Measure your weight in pounds (lb).  2. Multiply the number of pounds by 703.  ? For example, for a person who weighs 180 lb, multiply that number by 703, which equals 126,540.  3. Measure your height in inches (in). Then multiply that number by itself to get a measurement called \"inches squared.\"  ? For example, for a person who is 70 in tall, the \"inches squared\" measurement is 70 in x 70 in, which equals 4900 inches squared.  4. Divide the total from Step 2 (number of lb x 703) by the total from Step 3 (inches squared): 126,540 ÷ 4900 = 25.8. This is your BMI.  To calculate your BMI in metric measurements, your health care provider will:  1. Measure your weight in " "kilograms (kg).  2. Measure your height in meters (m). Then multiply that number by itself to get a measurement called \"meters squared.\"  ? For example, for a person who is 1.75 m tall, the \"meters squared\" measurement is 1.75 m x 1.75 m, which is equal to 3.1 meters squared.  3. Divide the number of kilograms (your weight) by the meters squared number. In this example: 70 ÷ 3.1 = 22.6. This is your BMI.  How is BMI interpreted?  To interpret your results, your health care provider will use BMI charts to identify whether you are underweight, normal weight, overweight, or obese. The following guidelines will be used:  · Underweight: BMI less than 18.5.  · Normal weight: BMI between 18.5 and 24.9.  · Overweight: BMI between 25 and 29.9.  · Obese: BMI of 30 and above.  Please note:  · Weight includes both fat and muscle, so someone with a muscular build, such as an athlete, may have a BMI that is higher than 24.9. In cases like these, BMI is not an accurate measure of body fat.  · To determine if excess body fat is the cause of a BMI of 25 or higher, further assessments may need to be done by a health care provider.  · BMI is usually interpreted in the same way for men and women.  Why is BMI a useful tool?  BMI is useful in two ways:  · Identifying a weight problem that may be related to a medical condition, or that may increase the risk for medical problems.  · Promoting lifestyle and diet changes in order to reach a healthy weight.  Summary  · Body mass index (BMI) is a number that is calculated from a person's weight and height.  · BMI may help to estimate how much of a person's weight is composed of fat. BMI can help identify those who may be at higher risk for certain medical problems.  · BMI can be measured using English measurements or metric measurements.  · To interpret your results, your health care provider will use BMI charts to identify whether you are underweight, normal weight, overweight, or " obese.  This information is not intended to replace advice given to you by your health care provider. Make sure you discuss any questions you have with your health care provider.  Document Released: 08/29/2005 Document Revised: 11/30/2018 Document Reviewed: 10/31/2018  [employer] Patient Education © 2020 [employer]

## 2020-07-08 ENCOUNTER — TELEPHONE (OUTPATIENT)
Dept: CARDIOLOGY | Facility: CLINIC | Age: 70
End: 2020-07-08

## 2020-07-08 DIAGNOSIS — R93.89 ABNORMAL CT SCAN, NECK: Primary | ICD-10-CM

## 2020-07-08 NOTE — TELEPHONE ENCOUNTER
CTA Neck:  90% stenosis, left side      Informed pt of results and of referral, she verbalized understanding that their office will call her to schedule appt.

## 2020-07-08 NOTE — TELEPHONE ENCOUNTER
----- Message from MELISSA Webb sent at 7/8/2020  4:26 PM EDT -----  Let patient know the results and refer her to Dr. Mcallister in [place]

## 2020-07-13 DIAGNOSIS — I65.22 CAROTID STENOSIS, ASYMPTOMATIC, LEFT: Primary | ICD-10-CM

## 2020-07-14 DIAGNOSIS — Z00.6 EXAMINATION FOR NORMAL COMPARISON FOR CLINICAL RESEARCH: Primary | ICD-10-CM

## 2020-07-16 ENCOUNTER — PREP FOR SURGERY (OUTPATIENT)
Dept: OTHER | Facility: HOSPITAL | Age: 70
End: 2020-07-16

## 2020-07-16 DIAGNOSIS — I65.22 STENOSIS OF LEFT CAROTID ARTERY: Primary | ICD-10-CM

## 2020-07-16 RX ORDER — CHLORHEXIDINE GLUCONATE 500 MG/1
1 CLOTH TOPICAL EVERY 12 HOURS PRN
Status: CANCELLED | OUTPATIENT
Start: 2020-07-19

## 2020-07-19 ENCOUNTER — APPOINTMENT (OUTPATIENT)
Dept: PREADMISSION TESTING | Facility: HOSPITAL | Age: 70
End: 2020-07-19

## 2020-07-19 ENCOUNTER — HOSPITAL ENCOUNTER (OUTPATIENT)
Dept: GENERAL RADIOLOGY | Facility: HOSPITAL | Age: 70
Discharge: HOME | End: 2020-07-19
Admitting: PHYSICIAN ASSISTANT

## 2020-07-19 VITALS — WEIGHT: 185.63 LBS | BODY MASS INDEX: 31.69 KG/M2 | HEIGHT: 64 IN

## 2020-07-19 DIAGNOSIS — I65.22 STENOSIS OF LEFT CAROTID ARTERY: ICD-10-CM

## 2020-07-19 LAB
ABO GROUP BLD: NORMAL
ANION GAP SERPL CALCULATED.3IONS-SCNC: 13 MMOL/L (ref 5–15)
BLD GP AB SCN SERPL QL: NEGATIVE
BUN SERPL-MCNC: 21 MG/DL (ref 8–23)
BUN/CREAT SERPL: 20.8 (ref 7–25)
CALCIUM SPEC-SCNC: 8.8 MG/DL (ref 8.6–10.5)
CHLORIDE SERPL-SCNC: 100 MMOL/L (ref 98–107)
CO2 SERPL-SCNC: 25 MMOL/L (ref 22–29)
CREAT SERPL-MCNC: 1.01 MG/DL (ref 0.57–1)
DEPRECATED RDW RBC AUTO: 48.6 FL (ref 37–54)
ERYTHROCYTE [DISTWIDTH] IN BLOOD BY AUTOMATED COUNT: 15.9 % (ref 12.3–15.4)
GFR SERPL CREATININE-BSD FRML MDRD: 55 ML/MIN/1.73
GLUCOSE SERPL-MCNC: 222 MG/DL (ref 65–99)
HBA1C MFR BLD: 9.7 % (ref 4.8–5.6)
HCT VFR BLD AUTO: 37.8 % (ref 34–46.6)
HGB BLD-MCNC: 11.8 G/DL (ref 12–15.9)
INR PPP: 1.02 (ref 0.85–1.16)
MCH RBC QN AUTO: 26.2 PG (ref 26.6–33)
MCHC RBC AUTO-ENTMCNC: 31.2 G/DL (ref 31.5–35.7)
MCV RBC AUTO: 84 FL (ref 79–97)
PLATELET # BLD AUTO: 315 10*3/MM3 (ref 140–450)
PMV BLD AUTO: 10.8 FL (ref 6–12)
POTASSIUM SERPL-SCNC: 3.8 MMOL/L (ref 3.5–5.2)
PROTHROMBIN TIME: 13.1 SECONDS (ref 11.5–14)
RBC # BLD AUTO: 4.5 10*6/MM3 (ref 3.77–5.28)
RH BLD: NEGATIVE
SODIUM SERPL-SCNC: 138 MMOL/L (ref 136–145)
T&S EXPIRATION DATE: NORMAL
WBC # BLD AUTO: 9.93 10*3/MM3 (ref 3.4–10.8)

## 2020-07-19 PROCEDURE — 36415 COLL VENOUS BLD VENIPUNCTURE: CPT

## 2020-07-19 PROCEDURE — C9803 HOPD COVID-19 SPEC COLLECT: HCPCS

## 2020-07-19 PROCEDURE — 80048 BASIC METABOLIC PNL TOTAL CA: CPT | Performed by: PHYSICIAN ASSISTANT

## 2020-07-19 PROCEDURE — 83036 HEMOGLOBIN GLYCOSYLATED A1C: CPT | Performed by: PHYSICIAN ASSISTANT

## 2020-07-19 PROCEDURE — 93005 ELECTROCARDIOGRAM TRACING: CPT

## 2020-07-19 PROCEDURE — 86850 RBC ANTIBODY SCREEN: CPT | Performed by: PHYSICIAN ASSISTANT

## 2020-07-19 PROCEDURE — 86901 BLOOD TYPING SEROLOGIC RH(D): CPT | Performed by: PHYSICIAN ASSISTANT

## 2020-07-19 PROCEDURE — 93010 ELECTROCARDIOGRAM REPORT: CPT | Performed by: INTERNAL MEDICINE

## 2020-07-19 PROCEDURE — 85610 PROTHROMBIN TIME: CPT | Performed by: PHYSICIAN ASSISTANT

## 2020-07-19 PROCEDURE — 86900 BLOOD TYPING SEROLOGIC ABO: CPT | Performed by: PHYSICIAN ASSISTANT

## 2020-07-19 PROCEDURE — U0002 COVID-19 LAB TEST NON-CDC: HCPCS | Performed by: THORACIC SURGERY (CARDIOTHORACIC VASCULAR SURGERY)

## 2020-07-19 PROCEDURE — 71046 X-RAY EXAM CHEST 2 VIEWS: CPT

## 2020-07-19 PROCEDURE — 85027 COMPLETE CBC AUTOMATED: CPT | Performed by: PHYSICIAN ASSISTANT

## 2020-07-19 RX ORDER — AMLODIPINE BESYLATE 5 MG/1
5 TABLET ORAL DAILY
COMMUNITY
End: 2020-07-24 | Stop reason: HOSPADM

## 2020-07-19 RX ORDER — ISOSORBIDE MONONITRATE 30 MG/1
30 TABLET, EXTENDED RELEASE ORAL DAILY
COMMUNITY
End: 2020-07-24 | Stop reason: HOSPADM

## 2020-07-20 LAB
REF LAB TEST METHOD: NORMAL
SARS-COV-2 RNA RESP QL NAA+PROBE: NOT DETECTED

## 2020-07-21 ENCOUNTER — ANESTHESIA EVENT (OUTPATIENT)
Dept: PERIOP | Facility: HOSPITAL | Age: 70
End: 2020-07-21

## 2020-07-21 RX ORDER — FAMOTIDINE 10 MG/ML
20 INJECTION, SOLUTION INTRAVENOUS ONCE
Status: CANCELLED | OUTPATIENT
Start: 2020-07-21 | End: 2020-07-21

## 2020-07-21 RX ORDER — SODIUM CHLORIDE 0.9 % (FLUSH) 0.9 %
10 SYRINGE (ML) INJECTION EVERY 12 HOURS SCHEDULED
Status: CANCELLED | OUTPATIENT
Start: 2020-07-21

## 2020-07-21 RX ORDER — SODIUM CHLORIDE 0.9 % (FLUSH) 0.9 %
10 SYRINGE (ML) INJECTION AS NEEDED
Status: CANCELLED | OUTPATIENT
Start: 2020-07-21

## 2020-07-22 ENCOUNTER — HOSPITAL ENCOUNTER (INPATIENT)
Facility: HOSPITAL | Age: 70
LOS: 2 days | Discharge: HOME | End: 2020-07-24
Attending: THORACIC SURGERY (CARDIOTHORACIC VASCULAR SURGERY) | Admitting: THORACIC SURGERY (CARDIOTHORACIC VASCULAR SURGERY)

## 2020-07-22 ENCOUNTER — APPOINTMENT (OUTPATIENT)
Dept: CT IMAGING | Facility: HOSPITAL | Age: 70
End: 2020-07-22

## 2020-07-22 ENCOUNTER — APPOINTMENT (OUTPATIENT)
Dept: NEUROLOGY | Facility: HOSPITAL | Age: 70
End: 2020-07-22

## 2020-07-22 ENCOUNTER — ANESTHESIA (OUTPATIENT)
Dept: PERIOP | Facility: HOSPITAL | Age: 70
End: 2020-07-22

## 2020-07-22 ENCOUNTER — ANESTHESIA EVENT (OUTPATIENT)
Dept: PERIOP | Facility: HOSPITAL | Age: 70
End: 2020-07-22

## 2020-07-22 DIAGNOSIS — I65.22 CAROTID STENOSIS, ASYMPTOMATIC, LEFT: ICD-10-CM

## 2020-07-22 DIAGNOSIS — I65.22 STENOSIS OF LEFT CAROTID ARTERY: ICD-10-CM

## 2020-07-22 LAB
GLUCOSE BLDC GLUCOMTR-MCNC: 235 MG/DL (ref 70–130)
GLUCOSE BLDC GLUCOMTR-MCNC: 247 MG/DL (ref 70–130)
GLUCOSE BLDC GLUCOMTR-MCNC: 288 MG/DL (ref 70–130)
GLUCOSE BLDC GLUCOMTR-MCNC: 305 MG/DL (ref 70–130)
GLUCOSE BLDC GLUCOMTR-MCNC: 407 MG/DL (ref 70–130)
GLUCOSE BLDC GLUCOMTR-MCNC: 416 MG/DL (ref 70–130)
GLUCOSE BLDC GLUCOMTR-MCNC: 453 MG/DL (ref 70–130)

## 2020-07-22 PROCEDURE — 25010000002 ONDANSETRON PER 1 MG: Performed by: NURSE ANESTHETIST, CERTIFIED REGISTERED

## 2020-07-22 PROCEDURE — 25010000002 PHENYLEPHRINE PER 1 ML: Performed by: NURSE ANESTHETIST, CERTIFIED REGISTERED

## 2020-07-22 PROCEDURE — 25010000002 DEXAMETHASONE PER 1 MG: Performed by: NURSE ANESTHETIST, CERTIFIED REGISTERED

## 2020-07-22 PROCEDURE — S0260 H&P FOR SURGERY: HCPCS | Performed by: THORACIC SURGERY (CARDIOTHORACIC VASCULAR SURGERY)

## 2020-07-22 PROCEDURE — 03CL0ZZ EXTIRPATION OF MATTER FROM LEFT INTERNAL CAROTID ARTERY, OPEN APPROACH: ICD-10-PCS | Performed by: THORACIC SURGERY (CARDIOTHORACIC VASCULAR SURGERY)

## 2020-07-22 PROCEDURE — 70498 CT ANGIOGRAPHY NECK: CPT

## 2020-07-22 PROCEDURE — 35301 RECHANNELING OF ARTERY: CPT | Performed by: PHYSICIAN ASSISTANT

## 2020-07-22 PROCEDURE — 25010000002 HEPARIN (PORCINE) PER 1000 UNITS: Performed by: NURSE ANESTHETIST, CERTIFIED REGISTERED

## 2020-07-22 PROCEDURE — 25010000002 PROTAMINE SULFATE PER 10 MG: Performed by: NURSE ANESTHETIST, CERTIFIED REGISTERED

## 2020-07-22 PROCEDURE — 63710000001 INSULIN DETEMIR PER 5 UNITS: Performed by: INTERNAL MEDICINE

## 2020-07-22 PROCEDURE — 25010000002 NEOSTIGMINE 10 MG/10ML SOLUTION: Performed by: NURSE ANESTHETIST, CERTIFIED REGISTERED

## 2020-07-22 PROCEDURE — 0 IOPAMIDOL PER 1 ML: Performed by: THORACIC SURGERY (CARDIOTHORACIC VASCULAR SURGERY)

## 2020-07-22 PROCEDURE — 25010000002 HEPARIN (PORCINE) PER 1000 UNITS: Performed by: THORACIC SURGERY (CARDIOTHORACIC VASCULAR SURGERY)

## 2020-07-22 PROCEDURE — 25010000002 FENTANYL CITRATE (PF) 100 MCG/2ML SOLUTION: Performed by: NURSE ANESTHETIST, CERTIFIED REGISTERED

## 2020-07-22 PROCEDURE — 95955 EEG DURING SURGERY: CPT

## 2020-07-22 PROCEDURE — 82962 GLUCOSE BLOOD TEST: CPT

## 2020-07-22 PROCEDURE — 35301 RECHANNELING OF ARTERY: CPT | Performed by: THORACIC SURGERY (CARDIOTHORACIC VASCULAR SURGERY)

## 2020-07-22 PROCEDURE — 25010000002 HYDRALAZINE PER 20 MG: Performed by: NURSE ANESTHETIST, CERTIFIED REGISTERED

## 2020-07-22 PROCEDURE — 88304 TISSUE EXAM BY PATHOLOGIST: CPT | Performed by: THORACIC SURGERY (CARDIOTHORACIC VASCULAR SURGERY)

## 2020-07-22 PROCEDURE — 03UL0KZ SUPPLEMENT LEFT INTERNAL CAROTID ARTERY WITH NONAUTOLOGOUS TISSUE SUBSTITUTE, OPEN APPROACH: ICD-10-PCS | Performed by: THORACIC SURGERY (CARDIOTHORACIC VASCULAR SURGERY)

## 2020-07-22 PROCEDURE — 63710000001 INSULIN LISPRO (HUMAN) PER 5 UNITS: Performed by: THORACIC SURGERY (CARDIOTHORACIC VASCULAR SURGERY)

## 2020-07-22 PROCEDURE — 95813 EEG EXTND MNTR 61-119 MIN: CPT

## 2020-07-22 PROCEDURE — C1768 GRAFT, VASCULAR: HCPCS | Performed by: THORACIC SURGERY (CARDIOTHORACIC VASCULAR SURGERY)

## 2020-07-22 PROCEDURE — 25010000002 HYDRALAZINE PER 20 MG: Performed by: INTERNAL MEDICINE

## 2020-07-22 PROCEDURE — 63710000001 INSULIN LISPRO (HUMAN) PER 5 UNITS: Performed by: INTERNAL MEDICINE

## 2020-07-22 PROCEDURE — 99232 SBSQ HOSP IP/OBS MODERATE 35: CPT | Performed by: INTERNAL MEDICINE

## 2020-07-22 PROCEDURE — 70496 CT ANGIOGRAPHY HEAD: CPT

## 2020-07-22 PROCEDURE — 88311 DECALCIFY TISSUE: CPT | Performed by: THORACIC SURGERY (CARDIOTHORACIC VASCULAR SURGERY)

## 2020-07-22 PROCEDURE — 25010000003 CEFUROXIME SODIUM 1.5 G RECONSTITUTED SOLUTION: Performed by: PHYSICIAN ASSISTANT

## 2020-07-22 PROCEDURE — 25010000002 PROPOFOL 10 MG/ML EMULSION: Performed by: NURSE ANESTHETIST, CERTIFIED REGISTERED

## 2020-07-22 PROCEDURE — 25010000003 LIDOCAINE 1 % SOLUTION: Performed by: THORACIC SURGERY (CARDIOTHORACIC VASCULAR SURGERY)

## 2020-07-22 PROCEDURE — 03HY32Z INSERTION OF MONITORING DEVICE INTO UPPER ARTERY, PERCUTANEOUS APPROACH: ICD-10-PCS | Performed by: ANESTHESIOLOGY

## 2020-07-22 PROCEDURE — 99222 1ST HOSP IP/OBS MODERATE 55: CPT | Performed by: INTERNAL MEDICINE

## 2020-07-22 PROCEDURE — C1894 INTRO/SHEATH, NON-LASER: HCPCS | Performed by: THORACIC SURGERY (CARDIOTHORACIC VASCULAR SURGERY)

## 2020-07-22 PROCEDURE — 25010000003 CEFUROXIME SODIUM 1.5 G RECONSTITUTED SOLUTION: Performed by: THORACIC SURGERY (CARDIOTHORACIC VASCULAR SURGERY)

## 2020-07-22 PROCEDURE — 25010000002 SUCCINYLCHOLINE PER 20 MG: Performed by: NURSE ANESTHETIST, CERTIFIED REGISTERED

## 2020-07-22 DEVICE — VASCU-GUARD PERIPHERAL VASCULAR PATCH IS PREPARED FROM BOVINE PERICARDIUM WHICH IS CROSS-LINKED WITH GLUTARALDEHYDE. THE PERICARDIUM IS PROCURED FROM CATTLE ORIGINATING IN THE UNITED STATES. VASCU-GUARD PERIPHERAL VASCULAR PATCH IS CHEMICALLY STERILIZED USING ETHANOL AND PROPYLENE OXIDE. VASCU-GUARD PERIPHERAL VASCULAR PATCH HAS BEEN TREATED WITH 1 MOLAR SODIUM HYDROXIDE FOR A MINIMUM OF 60 MINUTES AT 20 - 25 C.  VASCU-GUARD PERIPHERAL VASCULAR PATCH IS PACKAGED IN A CONTAINER FILLED WITH STERILE, NON-PYROGENIC WATER CONTAINING PROPYLENE OXIDE. THE CONTENTS OF THE UNOPENED, UNDAMAGED CONTAINER ARE STERILE.
Type: IMPLANTABLE DEVICE | Site: CAROTID | Status: FUNCTIONAL
Brand: VASCU-GUARD PERIPHERAL VASCULAR PATCH

## 2020-07-22 RX ORDER — HEPARIN SODIUM 1000 [USP'U]/ML
INJECTION, SOLUTION INTRAVENOUS; SUBCUTANEOUS AS NEEDED
Status: DISCONTINUED | OUTPATIENT
Start: 2020-07-22 | End: 2020-07-22 | Stop reason: SURG

## 2020-07-22 RX ORDER — FENTANYL CITRATE 50 UG/ML
INJECTION, SOLUTION INTRAMUSCULAR; INTRAVENOUS AS NEEDED
Status: DISCONTINUED | OUTPATIENT
Start: 2020-07-22 | End: 2020-07-22 | Stop reason: SURG

## 2020-07-22 RX ORDER — SODIUM CHLORIDE 9 MG/ML
INJECTION, SOLUTION INTRAVENOUS AS NEEDED
Status: DISCONTINUED | OUTPATIENT
Start: 2020-07-22 | End: 2020-07-22 | Stop reason: HOSPADM

## 2020-07-22 RX ORDER — LIDOCAINE HYDROCHLORIDE 10 MG/ML
INJECTION, SOLUTION INFILTRATION; PERINEURAL AS NEEDED
Status: DISCONTINUED | OUTPATIENT
Start: 2020-07-22 | End: 2020-07-22 | Stop reason: HOSPADM

## 2020-07-22 RX ORDER — FENTANYL CITRATE 50 UG/ML
50 INJECTION, SOLUTION INTRAMUSCULAR; INTRAVENOUS
Status: DISCONTINUED | OUTPATIENT
Start: 2020-07-22 | End: 2020-07-22 | Stop reason: HOSPADM

## 2020-07-22 RX ORDER — LIDOCAINE HYDROCHLORIDE 10 MG/ML
INJECTION, SOLUTION EPIDURAL; INFILTRATION; INTRACAUDAL; PERINEURAL AS NEEDED
Status: DISCONTINUED | OUTPATIENT
Start: 2020-07-22 | End: 2020-07-22 | Stop reason: SURG

## 2020-07-22 RX ORDER — SODIUM CHLORIDE, SODIUM LACTATE, POTASSIUM CHLORIDE, CALCIUM CHLORIDE 600; 310; 30; 20 MG/100ML; MG/100ML; MG/100ML; MG/100ML
9 INJECTION, SOLUTION INTRAVENOUS CONTINUOUS
Status: DISCONTINUED | OUTPATIENT
Start: 2020-07-22 | End: 2020-07-22

## 2020-07-22 RX ORDER — LABETALOL HYDROCHLORIDE 5 MG/ML
5 INJECTION, SOLUTION INTRAVENOUS
Status: DISCONTINUED | OUTPATIENT
Start: 2020-07-22 | End: 2020-07-22 | Stop reason: HOSPADM

## 2020-07-22 RX ORDER — IPRATROPIUM BROMIDE AND ALBUTEROL SULFATE 2.5; .5 MG/3ML; MG/3ML
3 SOLUTION RESPIRATORY (INHALATION) ONCE AS NEEDED
Status: DISCONTINUED | OUTPATIENT
Start: 2020-07-22 | End: 2020-07-22 | Stop reason: HOSPADM

## 2020-07-22 RX ORDER — PROTAMINE SULFATE 10 MG/ML
INJECTION, SOLUTION INTRAVENOUS AS NEEDED
Status: DISCONTINUED | OUTPATIENT
Start: 2020-07-22 | End: 2020-07-22 | Stop reason: SURG

## 2020-07-22 RX ORDER — GLYCOPYRROLATE 0.2 MG/ML
INJECTION INTRAMUSCULAR; INTRAVENOUS AS NEEDED
Status: DISCONTINUED | OUTPATIENT
Start: 2020-07-22 | End: 2020-07-22 | Stop reason: SURG

## 2020-07-22 RX ORDER — ONDANSETRON 2 MG/ML
4 INJECTION INTRAMUSCULAR; INTRAVENOUS ONCE AS NEEDED
Status: DISCONTINUED | OUTPATIENT
Start: 2020-07-22 | End: 2020-07-22 | Stop reason: HOSPADM

## 2020-07-22 RX ORDER — LIDOCAINE HYDROCHLORIDE 10 MG/ML
0.5 INJECTION, SOLUTION EPIDURAL; INFILTRATION; INTRACAUDAL; PERINEURAL ONCE AS NEEDED
Status: COMPLETED | OUTPATIENT
Start: 2020-07-22 | End: 2020-07-22

## 2020-07-22 RX ORDER — LIDOCAINE HYDROCHLORIDE 40 MG/ML
SOLUTION TOPICAL AS NEEDED
Status: DISCONTINUED | OUTPATIENT
Start: 2020-07-22 | End: 2020-07-22 | Stop reason: SURG

## 2020-07-22 RX ORDER — HYDRALAZINE HYDROCHLORIDE 20 MG/ML
20 INJECTION INTRAMUSCULAR; INTRAVENOUS EVERY 6 HOURS PRN
Status: DISCONTINUED | OUTPATIENT
Start: 2020-07-22 | End: 2020-07-24 | Stop reason: HOSPADM

## 2020-07-22 RX ORDER — HYDRALAZINE HYDROCHLORIDE 20 MG/ML
INJECTION INTRAMUSCULAR; INTRAVENOUS AS NEEDED
Status: DISCONTINUED | OUTPATIENT
Start: 2020-07-22 | End: 2020-07-22 | Stop reason: SURG

## 2020-07-22 RX ORDER — NEOSTIGMINE METHYLSULFATE 1 MG/ML
INJECTION, SOLUTION INTRAVENOUS AS NEEDED
Status: DISCONTINUED | OUTPATIENT
Start: 2020-07-22 | End: 2020-07-22 | Stop reason: SURG

## 2020-07-22 RX ORDER — THROMBIN HUMAN AND FIBRINOGEN 2; 5.5 [USP'U]/1; MG/1
PATCH TOPICAL AS NEEDED
Status: DISCONTINUED | OUTPATIENT
Start: 2020-07-22 | End: 2020-07-22 | Stop reason: HOSPADM

## 2020-07-22 RX ORDER — CLOPIDOGREL BISULFATE 75 MG/1
75 TABLET ORAL DAILY
Status: DISCONTINUED | OUTPATIENT
Start: 2020-07-23 | End: 2020-07-23

## 2020-07-22 RX ORDER — ISOSORBIDE MONONITRATE 30 MG/1
30 TABLET, EXTENDED RELEASE ORAL DAILY
Status: DISCONTINUED | OUTPATIENT
Start: 2020-07-22 | End: 2020-07-23

## 2020-07-22 RX ORDER — ROCURONIUM BROMIDE 10 MG/ML
INJECTION, SOLUTION INTRAVENOUS AS NEEDED
Status: DISCONTINUED | OUTPATIENT
Start: 2020-07-22 | End: 2020-07-22 | Stop reason: SURG

## 2020-07-22 RX ORDER — LABETALOL HYDROCHLORIDE 5 MG/ML
20 INJECTION, SOLUTION INTRAVENOUS EVERY 4 HOURS PRN
Status: DISCONTINUED | OUTPATIENT
Start: 2020-07-22 | End: 2020-07-24 | Stop reason: HOSPADM

## 2020-07-22 RX ORDER — ASPIRIN 81 MG/1
81 TABLET ORAL DAILY
Status: DISCONTINUED | OUTPATIENT
Start: 2020-07-22 | End: 2020-07-24 | Stop reason: HOSPADM

## 2020-07-22 RX ORDER — NICOTINE POLACRILEX 4 MG
15 LOZENGE BUCCAL
Status: DISCONTINUED | OUTPATIENT
Start: 2020-07-22 | End: 2020-07-24 | Stop reason: HOSPADM

## 2020-07-22 RX ORDER — NICOTINE POLACRILEX 4 MG
15 LOZENGE BUCCAL
Status: DISCONTINUED | OUTPATIENT
Start: 2020-07-22 | End: 2020-07-22 | Stop reason: SDUPTHER

## 2020-07-22 RX ORDER — ESMOLOL HYDROCHLORIDE 10 MG/ML
INJECTION INTRAVENOUS AS NEEDED
Status: DISCONTINUED | OUTPATIENT
Start: 2020-07-22 | End: 2020-07-22 | Stop reason: SURG

## 2020-07-22 RX ORDER — CLONIDINE HYDROCHLORIDE 0.1 MG/1
0.3 TABLET ORAL DAILY
Status: DISCONTINUED | OUTPATIENT
Start: 2020-07-22 | End: 2020-07-23

## 2020-07-22 RX ORDER — HEPARIN SODIUM 1000 [USP'U]/ML
5000 INJECTION, SOLUTION INTRAVENOUS; SUBCUTANEOUS ONCE
Status: DISCONTINUED | OUTPATIENT
Start: 2020-07-22 | End: 2020-07-22

## 2020-07-22 RX ORDER — PROPOFOL 10 MG/ML
VIAL (ML) INTRAVENOUS AS NEEDED
Status: DISCONTINUED | OUTPATIENT
Start: 2020-07-22 | End: 2020-07-22 | Stop reason: SURG

## 2020-07-22 RX ORDER — MAGNESIUM HYDROXIDE 1200 MG/15ML
LIQUID ORAL AS NEEDED
Status: DISCONTINUED | OUTPATIENT
Start: 2020-07-22 | End: 2020-07-22 | Stop reason: HOSPADM

## 2020-07-22 RX ORDER — SUCCINYLCHOLINE CHLORIDE 20 MG/ML
INJECTION INTRAMUSCULAR; INTRAVENOUS AS NEEDED
Status: DISCONTINUED | OUTPATIENT
Start: 2020-07-22 | End: 2020-07-22 | Stop reason: SURG

## 2020-07-22 RX ORDER — SODIUM CHLORIDE, SODIUM LACTATE, POTASSIUM CHLORIDE, CALCIUM CHLORIDE 600; 310; 30; 20 MG/100ML; MG/100ML; MG/100ML; MG/100ML
INJECTION, SOLUTION INTRAVENOUS CONTINUOUS PRN
Status: DISCONTINUED | OUTPATIENT
Start: 2020-07-22 | End: 2020-07-22 | Stop reason: SURG

## 2020-07-22 RX ORDER — PANTOPRAZOLE SODIUM 40 MG/1
40 TABLET, DELAYED RELEASE ORAL
Status: DISCONTINUED | OUTPATIENT
Start: 2020-07-23 | End: 2020-07-24 | Stop reason: HOSPADM

## 2020-07-22 RX ORDER — DEXAMETHASONE SODIUM PHOSPHATE 4 MG/ML
INJECTION, SOLUTION INTRA-ARTICULAR; INTRALESIONAL; INTRAMUSCULAR; INTRAVENOUS; SOFT TISSUE AS NEEDED
Status: DISCONTINUED | OUTPATIENT
Start: 2020-07-22 | End: 2020-07-22 | Stop reason: SURG

## 2020-07-22 RX ORDER — CARVEDILOL 12.5 MG/1
25 TABLET ORAL 3 TIMES DAILY
Status: DISCONTINUED | OUTPATIENT
Start: 2020-07-22 | End: 2020-07-24 | Stop reason: HOSPADM

## 2020-07-22 RX ORDER — AMLODIPINE BESYLATE 5 MG/1
5 TABLET ORAL DAILY
Status: DISCONTINUED | OUTPATIENT
Start: 2020-07-22 | End: 2020-07-23

## 2020-07-22 RX ORDER — NITROGLYCERIN 0.4 MG/1
0.4 TABLET SUBLINGUAL
Status: DISCONTINUED | OUTPATIENT
Start: 2020-07-22 | End: 2020-07-24 | Stop reason: HOSPADM

## 2020-07-22 RX ORDER — SODIUM CHLORIDE 0.9 % (FLUSH) 0.9 %
3 SYRINGE (ML) INJECTION EVERY 12 HOURS SCHEDULED
Status: DISCONTINUED | OUTPATIENT
Start: 2020-07-22 | End: 2020-07-24 | Stop reason: HOSPADM

## 2020-07-22 RX ORDER — DEXTROSE MONOHYDRATE 25 G/50ML
25 INJECTION, SOLUTION INTRAVENOUS
Status: DISCONTINUED | OUTPATIENT
Start: 2020-07-22 | End: 2020-07-24 | Stop reason: HOSPADM

## 2020-07-22 RX ORDER — HYDROCODONE BITARTRATE AND ACETAMINOPHEN 7.5; 325 MG/1; MG/1
1 TABLET ORAL EVERY 4 HOURS PRN
Status: DISCONTINUED | OUTPATIENT
Start: 2020-07-22 | End: 2020-07-23

## 2020-07-22 RX ORDER — ONDANSETRON 2 MG/ML
INJECTION INTRAMUSCULAR; INTRAVENOUS AS NEEDED
Status: DISCONTINUED | OUTPATIENT
Start: 2020-07-22 | End: 2020-07-22 | Stop reason: SURG

## 2020-07-22 RX ORDER — DEXTROSE MONOHYDRATE 25 G/50ML
25 INJECTION, SOLUTION INTRAVENOUS
Status: DISCONTINUED | OUTPATIENT
Start: 2020-07-22 | End: 2020-07-22 | Stop reason: SDUPTHER

## 2020-07-22 RX ORDER — VARENICLINE TARTRATE 0.5 MG/1
1 TABLET, FILM COATED ORAL 2 TIMES DAILY WITH MEALS
Status: DISCONTINUED | OUTPATIENT
Start: 2020-07-22 | End: 2020-07-24 | Stop reason: HOSPADM

## 2020-07-22 RX ORDER — HYDROXYZINE HYDROCHLORIDE 10 MG/1
10 TABLET, FILM COATED ORAL 3 TIMES DAILY PRN
COMMUNITY

## 2020-07-22 RX ORDER — ATORVASTATIN CALCIUM 40 MG/1
40 TABLET, FILM COATED ORAL NIGHTLY
Status: DISCONTINUED | OUTPATIENT
Start: 2020-07-22 | End: 2020-07-24 | Stop reason: HOSPADM

## 2020-07-22 RX ORDER — LABETALOL HYDROCHLORIDE 5 MG/ML
INJECTION, SOLUTION INTRAVENOUS AS NEEDED
Status: DISCONTINUED | OUTPATIENT
Start: 2020-07-22 | End: 2020-07-22 | Stop reason: SURG

## 2020-07-22 RX ORDER — SODIUM CHLORIDE 0.9 % (FLUSH) 0.9 %
10 SYRINGE (ML) INJECTION AS NEEDED
Status: DISCONTINUED | OUTPATIENT
Start: 2020-07-22 | End: 2020-07-24 | Stop reason: HOSPADM

## 2020-07-22 RX ORDER — FAMOTIDINE 20 MG/1
20 TABLET, FILM COATED ORAL ONCE
Status: COMPLETED | OUTPATIENT
Start: 2020-07-22 | End: 2020-07-22

## 2020-07-22 RX ADMIN — AMLODIPINE BESYLATE 5 MG: 5 TABLET ORAL at 13:13

## 2020-07-22 RX ADMIN — NEOSTIGMINE 3 MG: 1 INJECTION INTRAVENOUS at 08:14

## 2020-07-22 RX ADMIN — EPHEDRINE SULFATE 10 MG: 50 INJECTION INTRAMUSCULAR; INTRAVENOUS; SUBCUTANEOUS at 07:21

## 2020-07-22 RX ADMIN — VARENICLINE TARTRATE 1 MG: 0.5 TABLET, FILM COATED ORAL at 16:57

## 2020-07-22 RX ADMIN — CARVEDILOL 25 MG: 12.5 TABLET, FILM COATED ORAL at 14:39

## 2020-07-22 RX ADMIN — LABETALOL HYDROCHLORIDE 5 MG: 5 INJECTION, SOLUTION INTRAVENOUS at 10:10

## 2020-07-22 RX ADMIN — SODIUM CHLORIDE, POTASSIUM CHLORIDE, SODIUM LACTATE AND CALCIUM CHLORIDE 9 ML/HR: 600; 310; 30; 20 INJECTION, SOLUTION INTRAVENOUS at 05:56

## 2020-07-22 RX ADMIN — SODIUM CHLORIDE 10 MG/HR: 9 INJECTION, SOLUTION INTRAVENOUS at 07:57

## 2020-07-22 RX ADMIN — SODIUM CHLORIDE, POTASSIUM CHLORIDE, SODIUM LACTATE AND CALCIUM CHLORIDE 9 ML/HR: 600; 310; 30; 20 INJECTION, SOLUTION INTRAVENOUS at 06:07

## 2020-07-22 RX ADMIN — LABETALOL HYDROCHLORIDE 2.5 MG: 5 INJECTION, SOLUTION INTRAVENOUS at 08:11

## 2020-07-22 RX ADMIN — PROPOFOL 100 MG: 10 INJECTION, EMULSION INTRAVENOUS at 09:40

## 2020-07-22 RX ADMIN — INSULIN LISPRO 5 UNITS: 100 INJECTION, SOLUTION INTRAVENOUS; SUBCUTANEOUS at 16:55

## 2020-07-22 RX ADMIN — SUCCINYLCHOLINE CHLORIDE 100 MG: 20 INJECTION, SOLUTION INTRAMUSCULAR; INTRAVENOUS; PARENTERAL at 09:40

## 2020-07-22 RX ADMIN — NICARDIPINE HYDROCHLORIDE 10 MG/HR: 0.1 INJECTION, SOLUTION INTRAVENOUS at 12:10

## 2020-07-22 RX ADMIN — ROCURONIUM BROMIDE 10 MG: 10 INJECTION INTRAVENOUS at 09:40

## 2020-07-22 RX ADMIN — INSULIN LISPRO 9 UNITS: 100 INJECTION, SOLUTION INTRAVENOUS; SUBCUTANEOUS at 14:10

## 2020-07-22 RX ADMIN — CARVEDILOL 25 MG: 12.5 TABLET, FILM COATED ORAL at 20:46

## 2020-07-22 RX ADMIN — PHENYLEPHRINE HYDROCHLORIDE 1 MCG/KG/MIN: 10 INJECTION INTRAVENOUS at 09:42

## 2020-07-22 RX ADMIN — HYDRALAZINE HYDROCHLORIDE 5 MG: 20 INJECTION INTRAMUSCULAR; INTRAVENOUS at 10:28

## 2020-07-22 RX ADMIN — LABETALOL HYDROCHLORIDE 5 MG: 5 INJECTION, SOLUTION INTRAVENOUS at 10:26

## 2020-07-22 RX ADMIN — SODIUM CHLORIDE, POTASSIUM CHLORIDE, SODIUM LACTATE AND CALCIUM CHLORIDE: 600; 310; 30; 20 INJECTION, SOLUTION INTRAVENOUS at 07:00

## 2020-07-22 RX ADMIN — FENTANYL CITRATE 50 MCG: 50 INJECTION, SOLUTION INTRAMUSCULAR; INTRAVENOUS at 08:16

## 2020-07-22 RX ADMIN — ONDANSETRON 4 MG: 2 INJECTION INTRAMUSCULAR; INTRAVENOUS at 08:09

## 2020-07-22 RX ADMIN — HYDRALAZINE HYDROCHLORIDE 20 MG: 20 INJECTION INTRAMUSCULAR; INTRAVENOUS at 14:50

## 2020-07-22 RX ADMIN — CEFUROXIME SODIUM 1.5 G: 1.5 INJECTION, POWDER, FOR SOLUTION INTRAVENOUS at 15:07

## 2020-07-22 RX ADMIN — NICARDIPINE HYDROCHLORIDE 12.5 MG/HR: 0.1 INJECTION, SOLUTION INTRAVENOUS at 15:54

## 2020-07-22 RX ADMIN — IOPAMIDOL 75 ML: 755 INJECTION, SOLUTION INTRAVENOUS at 09:25

## 2020-07-22 RX ADMIN — PHENYLEPHRINE HYDROCHLORIDE 160 MCG: 10 INJECTION INTRAVENOUS at 07:19

## 2020-07-22 RX ADMIN — PROTAMINE SULFATE 50 MG: 10 INJECTION, SOLUTION INTRAVENOUS at 08:06

## 2020-07-22 RX ADMIN — INSULIN LISPRO 7 UNITS: 100 INJECTION, SOLUTION INTRAVENOUS; SUBCUTANEOUS at 16:57

## 2020-07-22 RX ADMIN — NICARDIPINE HYDROCHLORIDE 15 MG/HR: 0.1 INJECTION, SOLUTION INTRAVENOUS at 22:49

## 2020-07-22 RX ADMIN — NICARDIPINE HYDROCHLORIDE 15 MG/HR: 0.1 INJECTION, SOLUTION INTRAVENOUS at 14:43

## 2020-07-22 RX ADMIN — FAMOTIDINE 20 MG: 20 TABLET, FILM COATED ORAL at 06:12

## 2020-07-22 RX ADMIN — NEOSTIGMINE 3 MG: 1 INJECTION INTRAVENOUS at 10:23

## 2020-07-22 RX ADMIN — LABETALOL HYDROCHLORIDE 10 MG: 5 INJECTION, SOLUTION INTRAVENOUS at 08:29

## 2020-07-22 RX ADMIN — LIDOCAINE HYDROCHLORIDE 50 MG: 10 INJECTION, SOLUTION EPIDURAL; INFILTRATION; INTRACAUDAL; PERINEURAL at 09:40

## 2020-07-22 RX ADMIN — HEPARIN SODIUM 10000 UNITS: 1000 INJECTION, SOLUTION INTRAVENOUS; SUBCUTANEOUS at 09:33

## 2020-07-22 RX ADMIN — INSULIN LISPRO 4 UNITS: 100 INJECTION, SOLUTION INTRAVENOUS; SUBCUTANEOUS at 20:47

## 2020-07-22 RX ADMIN — ESMOLOL HYDROCHLORIDE 30 MG: 10 INJECTION, SOLUTION INTRAVENOUS at 07:58

## 2020-07-22 RX ADMIN — PHENYLEPHRINE HYDROCHLORIDE 160 MCG: 10 INJECTION INTRAVENOUS at 09:42

## 2020-07-22 RX ADMIN — HEPARIN SODIUM 5000 UNITS: 1000 INJECTION INTRAVENOUS; SUBCUTANEOUS at 09:00

## 2020-07-22 RX ADMIN — LIDOCAINE HYDROCHLORIDE 0.5 ML: 10 INJECTION, SOLUTION EPIDURAL; INFILTRATION; INTRACAUDAL; PERINEURAL at 05:56

## 2020-07-22 RX ADMIN — ATORVASTATIN CALCIUM 40 MG: 40 TABLET, FILM COATED ORAL at 20:46

## 2020-07-22 RX ADMIN — LABETALOL 20 MG/4 ML (5 MG/ML) INTRAVENOUS SYRINGE 20 MG: at 18:40

## 2020-07-22 RX ADMIN — SODIUM CHLORIDE, POTASSIUM CHLORIDE, SODIUM LACTATE AND CALCIUM CHLORIDE: 600; 310; 30; 20 INJECTION, SOLUTION INTRAVENOUS at 09:32

## 2020-07-22 RX ADMIN — PHENYLEPHRINE HYDROCHLORIDE 160 MCG: 10 INJECTION INTRAVENOUS at 09:44

## 2020-07-22 RX ADMIN — HYDRALAZINE HYDROCHLORIDE 5 MG: 20 INJECTION INTRAMUSCULAR; INTRAVENOUS at 08:35

## 2020-07-22 RX ADMIN — EPHEDRINE SULFATE 10 MG: 50 INJECTION INTRAMUSCULAR; INTRAVENOUS; SUBCUTANEOUS at 07:17

## 2020-07-22 RX ADMIN — EPHEDRINE SULFATE 10 MG: 50 INJECTION INTRAMUSCULAR; INTRAVENOUS; SUBCUTANEOUS at 07:19

## 2020-07-22 RX ADMIN — HEPARIN SODIUM 5000 UNITS: 1000 INJECTION, SOLUTION INTRAVENOUS; SUBCUTANEOUS at 07:39

## 2020-07-22 RX ADMIN — PHENYLEPHRINE HYDROCHLORIDE 160 MCG: 10 INJECTION INTRAVENOUS at 07:21

## 2020-07-22 RX ADMIN — PHENYLEPHRINE HYDROCHLORIDE 0.5 MCG/KG/MIN: 10 INJECTION INTRAVENOUS at 07:16

## 2020-07-22 RX ADMIN — NICARDIPINE HYDROCHLORIDE 15 MG/HR: 0.1 INJECTION, SOLUTION INTRAVENOUS at 19:37

## 2020-07-22 RX ADMIN — GLYCOPYRROLATE 0.4 MG: 0.2 INJECTION INTRAMUSCULAR; INTRAVENOUS at 08:14

## 2020-07-22 RX ADMIN — ROCURONIUM BROMIDE 50 MG: 10 INJECTION INTRAVENOUS at 07:08

## 2020-07-22 RX ADMIN — INSULIN LISPRO 10 UNITS: 100 INJECTION, SOLUTION INTRAVENOUS; SUBCUTANEOUS at 12:05

## 2020-07-22 RX ADMIN — LIDOCAINE HYDROCHLORIDE 1 EACH: 40 SOLUTION TOPICAL at 07:10

## 2020-07-22 RX ADMIN — ISOSORBIDE MONONITRATE 30 MG: 30 TABLET, EXTENDED RELEASE ORAL at 13:13

## 2020-07-22 RX ADMIN — PROPOFOL 150 MG: 10 INJECTION, EMULSION INTRAVENOUS at 07:08

## 2020-07-22 RX ADMIN — LIDOCAINE HYDROCHLORIDE 50 MG: 10 INJECTION, SOLUTION EPIDURAL; INFILTRATION; INTRACAUDAL; PERINEURAL at 07:08

## 2020-07-22 RX ADMIN — INSULIN DETEMIR 10 UNITS: 100 INJECTION, SOLUTION SUBCUTANEOUS at 20:52

## 2020-07-22 RX ADMIN — SODIUM CHLORIDE, PRESERVATIVE FREE 3 ML: 5 INJECTION INTRAVENOUS at 20:53

## 2020-07-22 RX ADMIN — LABETALOL 20 MG/4 ML (5 MG/ML) INTRAVENOUS SYRINGE 20 MG: at 14:50

## 2020-07-22 RX ADMIN — SODIUM CHLORIDE 10 MG/HR: 9 INJECTION, SOLUTION INTRAVENOUS at 10:00

## 2020-07-22 RX ADMIN — LABETALOL HYDROCHLORIDE 2.5 MG: 5 INJECTION, SOLUTION INTRAVENOUS at 08:15

## 2020-07-22 RX ADMIN — NICARDIPINE HYDROCHLORIDE 15 MG/HR: 0.1 INJECTION, SOLUTION INTRAVENOUS at 20:08

## 2020-07-22 RX ADMIN — ROCURONIUM BROMIDE 10 MG: 10 INJECTION INTRAVENOUS at 09:45

## 2020-07-22 RX ADMIN — NICARDIPINE HYDROCHLORIDE 15 MG/HR: 0.1 INJECTION, SOLUTION INTRAVENOUS at 21:36

## 2020-07-22 RX ADMIN — CLONIDINE HYDROCHLORIDE 0.3 MG: 0.1 TABLET ORAL at 13:12

## 2020-07-22 RX ADMIN — LABETALOL HYDROCHLORIDE 10 MG: 5 INJECTION, SOLUTION INTRAVENOUS at 08:27

## 2020-07-22 RX ADMIN — ASPIRIN 81 MG: 81 TABLET, COATED ORAL at 13:13

## 2020-07-22 RX ADMIN — LIDOCAINE HYDROCHLORIDE 2 ML: 20 JELLY TOPICAL at 07:10

## 2020-07-22 RX ADMIN — NICARDIPINE HYDROCHLORIDE 15 MG/HR: 0.1 INJECTION, SOLUTION INTRAVENOUS at 17:25

## 2020-07-22 RX ADMIN — FENTANYL CITRATE 50 MCG: 50 INJECTION, SOLUTION INTRAMUSCULAR; INTRAVENOUS at 07:08

## 2020-07-22 RX ADMIN — DEXAMETHASONE SODIUM PHOSPHATE 4 MG: 4 INJECTION, SOLUTION INTRAMUSCULAR; INTRAVENOUS at 07:15

## 2020-07-22 RX ADMIN — HYDROCODONE BITARTRATE AND ACETAMINOPHEN 1 TABLET: 7.5; 325 TABLET ORAL at 17:37

## 2020-07-22 RX ADMIN — PHENYLEPHRINE HYDROCHLORIDE 80 MCG: 10 INJECTION INTRAVENOUS at 09:40

## 2020-07-22 RX ADMIN — HYDROCODONE BITARTRATE AND ACETAMINOPHEN 1 TABLET: 7.5; 325 TABLET ORAL at 13:12

## 2020-07-22 RX ADMIN — GLYCOPYRROLATE 0.4 MG: 0.2 INJECTION INTRAMUSCULAR; INTRAVENOUS at 10:23

## 2020-07-22 RX ADMIN — INSULIN LISPRO 5 UNITS: 100 INJECTION, SOLUTION INTRAVENOUS; SUBCUTANEOUS at 14:04

## 2020-07-22 RX ADMIN — ESMOLOL HYDROCHLORIDE 30 MG: 10 INJECTION, SOLUTION INTRAVENOUS at 10:30

## 2020-07-22 RX ADMIN — HYDRALAZINE HYDROCHLORIDE 5 MG: 20 INJECTION INTRAMUSCULAR; INTRAVENOUS at 10:33

## 2020-07-22 NOTE — ANESTHESIA PROCEDURE NOTES
Airway  Urgency: elective    Date/Time: 7/22/2020 9:40 AM  Airway not difficult    General Information and Staff    Patient location during procedure: OR  CRNA: Galen Boyd CRNA    Indications and Patient Condition  Indications for airway management: airway protection    Preoxygenated: yes  MILS not maintained throughout  Mask difficulty assessment: 0 - not attempted    Final Airway Details  Final airway type: endotracheal airway      Successful airway: ETT  Cuffed: yes   Successful intubation technique: direct laryngoscopy and RSI  Endotracheal tube insertion site: oral  Blade: Kelley  Blade size: 2  ETT size (mm): 7.0  Cormack-Lehane Classification: grade I - full view of glottis  Placement verified by: chest auscultation and capnometry   Cuff volume (mL): 6  Measured from: lips  ETT/EBT  to lips (cm): 20  Number of attempts at approach: 1  Assessment: lips, teeth, and gum same as pre-op and atraumatic intubation    Additional Comments  Negative epigastric sounds, Breath sound equal bilaterally with symmetric chest rise and fall

## 2020-07-22 NOTE — ANESTHESIA PROCEDURE NOTES
Arterial Line      Patient reassessed immediately prior to procedure    Patient location during procedure: pre-op  Start time: 7/22/2020 6:35 AM  Stop Time:7/22/2020 6:40 AM       Line placed for hemodynamic monitoring.  Performed By   Anesthesiologist: Braxton Buck MD  Preanesthetic Checklist  Completed: patient identified, site marked, surgical consent, pre-op evaluation, timeout performed, IV checked, risks and benefits discussed and monitors and equipment checked  Arterial Line Prep   Sterile Tech: cap, gloves and sterile barriers  Prep: ChloraPrep  Patient monitoring: blood pressure monitoring, continuous pulse oximetry and EKG  Arterial Line Procedure   Laterality:left  Location:  radial artery  Catheter size: 20 G   Guidance: palpation technique  Number of attempts: 1  Successful placement: yes  Post Assessment   Dressing Type: line sutured, occlusive dressing applied, secured with tape and wrist guard applied.   Complications no  Circ/Move/Sens Assessment: normal and unchanged.   Patient Tolerance: patient tolerated the procedure well with no apparent complications

## 2020-07-22 NOTE — ANESTHESIA PROCEDURE NOTES
Airway  Urgency: elective    Date/Time: 7/22/2020 7:10 AM  Airway not difficult    General Information and Staff    Patient location during procedure: OR  CRNA: Galen Boyd CRNA    Indications and Patient Condition  Indications for airway management: airway protection    Preoxygenated: yes  MILS not maintained throughout  Mask difficulty assessment: 2 - vent by mask + OA or adjuvant +/- NMBA    Final Airway Details  Final airway type: endotracheal airway      Successful airway: ETT  Cuffed: yes   Successful intubation technique: direct laryngoscopy  Facilitating devices/methods: intubating stylet  Endotracheal tube insertion site: oral  Blade: Kelley  Blade size: 2  ETT size (mm): 7.0  Cormack-Lehane Classification: grade I - full view of glottis  Placement verified by: chest auscultation and capnometry   Cuff volume (mL): 6  Measured from: lips  ETT/EBT  to lips (cm): 21  Number of attempts at approach: 1  Assessment: lips, teeth, and gum same as pre-op and atraumatic intubation    Additional Comments  Negative epigastric sounds, Breath sound equal bilaterally with symmetric chest rise and fall

## 2020-07-22 NOTE — ANESTHESIA POSTPROCEDURE EVALUATION
Patient: Lyndsay Franco    Procedure Summary     Date:  07/22/20 Room / Location:   [facility] [address] [place] [address] [facility]    Anesthesia Start:  0932 Anesthesia Stop:      Procedure:  CAROTID ENDARTERECTOMY WITH EEG (Left Neck) Diagnosis:      Surgeon:  Quang Mcallister MD Provider:  Braxton Buck MD    Anesthesia Type:  Not recorded ASA Status:  Not recorded          Anesthesia Type: No value filed.    Vitals  Vitals Value Taken Time   /50 7/22/2020 11:05 AM   Temp     Pulse 77 7/22/2020 11:07 AM   Resp     SpO2 93 % 7/22/2020 11:07 AM   Vitals shown include unvalidated device data.        Post Anesthesia Care and Evaluation    Patient location during evaluation: PACU  Patient participation: complete - patient participated  Level of consciousness: lethargic  Pain score: 0  Pain management: adequate  Airway patency: patent  Anesthetic complications: No anesthetic complications  PONV Status: none  Cardiovascular status: hemodynamically stable and acceptable  Respiratory status: nonlabored ventilation, acceptable and nasal cannula  Hydration status: acceptable    Comments: pts still very lethargic on Right side-Dr. Mcallister aware

## 2020-07-22 NOTE — ANESTHESIA PREPROCEDURE EVALUATION
Anesthesia Evaluation     Patient summary reviewed and Nursing notes reviewed   no history of anesthetic complications:  NPO Solid Status: > 8 hours  NPO Liquid Status: > 2 hours           Airway   Mallampati: II  TM distance: >3 FB  Neck ROM: full  No difficulty expected  Dental    (+) edentulous    Pulmonary - normal exam    breath sounds clear to auscultation  (+) a smoker (quit x 1 yr) Former,   Cardiovascular - normal exam    ECG reviewed  Rhythm: regular  Rate: normal    (+) hypertension, past MI , CAD, cardiac stents PVD,  carotid artery disease    ROS comment: 6/20 Echo:  1.  LV size normal global LV systolic function is in the low normal to mildly depressed range with a visually estimated ejection fraction of 45 to 50%.  Moderate concentric left ventricular hypertrophy with grade 1 diastolic dysfunction mild left atrial enlargement.  Right heart chambers are normal.  No septal defect mass or thrombus.     2.  Valves are morphologically normal with no stenotic lesions or valve associated masses or thrombi.  There is trivial to mild MR.     3.  No pericardial or great vessel pathology.     4.  Pulmonary artery pressures cannot be calculated.    Neuro/Psych- negative ROS  GI/Hepatic/Renal/Endo    (+)  GERD,  renal disease (Renal artery stenosis), diabetes mellitus type 2 poorly controlled,     Musculoskeletal     Abdominal    Substance History      OB/GYN          Other   arthritis,                      Anesthesia Plan    ASA 4     general   (Teresa for bp/lab monitoring)  intravenous induction     Anesthetic plan, all risks, benefits, and alternatives have been provided, discussed and informed consent has been obtained with: patient.    Plan discussed with CRNA.

## 2020-07-22 NOTE — ANESTHESIA POSTPROCEDURE EVALUATION
Patient: Lyndsay Franco    Procedure Summary     Date:  07/22/20 Room / Location:   [facility]    Anesthesia Start:  0701 Anesthesia Stop:      Procedure:  CAROTID ENDARTERECTOMY LEFT (Left Neck) Diagnosis:       Carotid stenosis, asymptomatic, left      (Carotid stenosis, asymptomatic, left [I65.22])    Surgeon:  Quang Mcallister MD Provider:  Braxton Buck MD    Anesthesia Type:  general ASA Status:  4          Anesthesia Type: general    Vitals  Vitals Value Taken Time   /79 7/22/2020  8:42 AM   Temp     Pulse 67 7/22/2020  8:45 AM   Resp     SpO2 96 % 7/22/2020  8:45 AM   Vitals shown include unvalidated device data.        Post Anesthesia Care and Evaluation    Patient location during evaluation: PACU  Patient participation: complete - patient cannot participate  Level of consciousness: sleepy but conscious  Pain score: 0  Pain management: adequate  Airway patency: patent  Anesthetic complications: No anesthetic complications  PONV Status: none  Cardiovascular status: hemodynamically stable and acceptable  Respiratory status: nonlabored ventilation, acceptable and nasal cannula  Hydration status: acceptable    Comments: Pt with right sided weakness, EEG slowing.  Dr. Mcallister planning on taking pt back to OR after getting CTA of carotids

## 2020-07-23 LAB
ANION GAP SERPL CALCULATED.3IONS-SCNC: 14 MMOL/L (ref 5–15)
BASOPHILS # BLD AUTO: 0.02 10*3/MM3 (ref 0–0.2)
BASOPHILS NFR BLD AUTO: 0.1 % (ref 0–1.5)
BUN SERPL-MCNC: 21 MG/DL (ref 8–23)
BUN/CREAT SERPL: 24.7 (ref 7–25)
CALCIUM SPEC-SCNC: 8 MG/DL (ref 8.6–10.5)
CHLORIDE SERPL-SCNC: 101 MMOL/L (ref 98–107)
CO2 SERPL-SCNC: 18 MMOL/L (ref 22–29)
CREAT SERPL-MCNC: 0.85 MG/DL (ref 0.57–1)
DEPRECATED RDW RBC AUTO: 50.6 FL (ref 37–54)
EOSINOPHIL # BLD AUTO: 0.09 10*3/MM3 (ref 0–0.4)
EOSINOPHIL NFR BLD AUTO: 0.7 % (ref 0.3–6.2)
ERYTHROCYTE [DISTWIDTH] IN BLOOD BY AUTOMATED COUNT: 16.6 % (ref 12.3–15.4)
GFR SERPL CREATININE-BSD FRML MDRD: 67 ML/MIN/1.73
GLUCOSE BLDC GLUCOMTR-MCNC: 206 MG/DL (ref 70–130)
GLUCOSE BLDC GLUCOMTR-MCNC: 212 MG/DL (ref 70–130)
GLUCOSE BLDC GLUCOMTR-MCNC: 223 MG/DL (ref 70–130)
GLUCOSE BLDC GLUCOMTR-MCNC: 245 MG/DL (ref 70–130)
GLUCOSE SERPL-MCNC: 200 MG/DL (ref 65–99)
HCT VFR BLD AUTO: 32.9 % (ref 34–46.6)
HGB BLD-MCNC: 10.2 G/DL (ref 12–15.9)
IMM GRANULOCYTES # BLD AUTO: 0.09 10*3/MM3 (ref 0–0.05)
IMM GRANULOCYTES NFR BLD AUTO: 0.7 % (ref 0–0.5)
LYMPHOCYTES # BLD AUTO: 0.79 10*3/MM3 (ref 0.7–3.1)
LYMPHOCYTES NFR BLD AUTO: 5.9 % (ref 19.6–45.3)
MCH RBC QN AUTO: 26.2 PG (ref 26.6–33)
MCHC RBC AUTO-ENTMCNC: 31 G/DL (ref 31.5–35.7)
MCV RBC AUTO: 84.4 FL (ref 79–97)
MONOCYTES # BLD AUTO: 0.89 10*3/MM3 (ref 0.1–0.9)
MONOCYTES NFR BLD AUTO: 6.6 % (ref 5–12)
NEUTROPHILS NFR BLD AUTO: 11.61 10*3/MM3 (ref 1.7–7)
NEUTROPHILS NFR BLD AUTO: 86 % (ref 42.7–76)
NRBC BLD AUTO-RTO: 0 /100 WBC (ref 0–0.2)
PLATELET # BLD AUTO: 290 10*3/MM3 (ref 140–450)
PMV BLD AUTO: 10.9 FL (ref 6–12)
POTASSIUM SERPL-SCNC: 3.9 MMOL/L (ref 3.5–5.2)
RBC # BLD AUTO: 3.9 10*6/MM3 (ref 3.77–5.28)
SODIUM SERPL-SCNC: 133 MMOL/L (ref 136–145)
WBC # BLD AUTO: 13.49 10*3/MM3 (ref 3.4–10.8)

## 2020-07-23 PROCEDURE — 94799 UNLISTED PULMONARY SVC/PX: CPT

## 2020-07-23 PROCEDURE — 25010000003 CEFUROXIME SODIUM 1.5 G RECONSTITUTED SOLUTION: Performed by: THORACIC SURGERY (CARDIOTHORACIC VASCULAR SURGERY)

## 2020-07-23 PROCEDURE — 25010000002 PROMETHAZINE PER 50 MG: Performed by: NURSE PRACTITIONER

## 2020-07-23 PROCEDURE — 99233 SBSQ HOSP IP/OBS HIGH 50: CPT | Performed by: INTERNAL MEDICINE

## 2020-07-23 PROCEDURE — 82962 GLUCOSE BLOOD TEST: CPT

## 2020-07-23 PROCEDURE — 63710000001 INSULIN LISPRO (HUMAN) PER 5 UNITS: Performed by: INTERNAL MEDICINE

## 2020-07-23 PROCEDURE — 63710000001 INSULIN DETEMIR PER 5 UNITS: Performed by: PHYSICIAN ASSISTANT

## 2020-07-23 PROCEDURE — 25010000002 ONDANSETRON PER 1 MG: Performed by: NURSE PRACTITIONER

## 2020-07-23 PROCEDURE — 63710000001 INSULIN LISPRO (HUMAN) PER 5 UNITS: Performed by: PHYSICIAN ASSISTANT

## 2020-07-23 PROCEDURE — 99024 POSTOP FOLLOW-UP VISIT: CPT | Performed by: THORACIC SURGERY (CARDIOTHORACIC VASCULAR SURGERY)

## 2020-07-23 PROCEDURE — 80048 BASIC METABOLIC PNL TOTAL CA: CPT | Performed by: PHYSICIAN ASSISTANT

## 2020-07-23 PROCEDURE — 85025 COMPLETE CBC W/AUTO DIFF WBC: CPT | Performed by: PHYSICIAN ASSISTANT

## 2020-07-23 PROCEDURE — 99232 SBSQ HOSP IP/OBS MODERATE 35: CPT | Performed by: INTERNAL MEDICINE

## 2020-07-23 RX ORDER — LOSARTAN POTASSIUM 50 MG/1
50 TABLET ORAL
Status: DISCONTINUED | OUTPATIENT
Start: 2020-07-23 | End: 2020-07-24

## 2020-07-23 RX ORDER — PROMETHAZINE HYDROCHLORIDE 25 MG/ML
6.25 INJECTION, SOLUTION INTRAMUSCULAR; INTRAVENOUS ONCE
Status: COMPLETED | OUTPATIENT
Start: 2020-07-23 | End: 2020-07-23

## 2020-07-23 RX ORDER — AMLODIPINE BESYLATE 5 MG/1
5 TABLET ORAL ONCE
Status: COMPLETED | OUTPATIENT
Start: 2020-07-23 | End: 2020-07-23

## 2020-07-23 RX ORDER — CLOPIDOGREL BISULFATE 75 MG/1
75 TABLET ORAL DAILY
Status: DISCONTINUED | OUTPATIENT
Start: 2020-07-24 | End: 2020-07-24 | Stop reason: HOSPADM

## 2020-07-23 RX ORDER — AMLODIPINE BESYLATE 10 MG/1
10 TABLET ORAL DAILY
Status: DISCONTINUED | OUTPATIENT
Start: 2020-07-23 | End: 2020-07-24 | Stop reason: HOSPADM

## 2020-07-23 RX ORDER — ACETAMINOPHEN 325 MG/1
650 TABLET ORAL EVERY 4 HOURS PRN
Status: DISCONTINUED | OUTPATIENT
Start: 2020-07-23 | End: 2020-07-24 | Stop reason: HOSPADM

## 2020-07-23 RX ORDER — ISOSORBIDE MONONITRATE 30 MG/1
30 TABLET, EXTENDED RELEASE ORAL ONCE
Status: COMPLETED | OUTPATIENT
Start: 2020-07-23 | End: 2020-07-23

## 2020-07-23 RX ORDER — ISOSORBIDE MONONITRATE 60 MG/1
60 TABLET, EXTENDED RELEASE ORAL DAILY
Status: DISCONTINUED | OUTPATIENT
Start: 2020-07-24 | End: 2020-07-24 | Stop reason: HOSPADM

## 2020-07-23 RX ORDER — CLONIDINE HYDROCHLORIDE 0.1 MG/1
0.3 TABLET ORAL EVERY 12 HOURS SCHEDULED
Status: DISCONTINUED | OUTPATIENT
Start: 2020-07-23 | End: 2020-07-24 | Stop reason: HOSPADM

## 2020-07-23 RX ORDER — ONDANSETRON 2 MG/ML
4 INJECTION INTRAMUSCULAR; INTRAVENOUS EVERY 6 HOURS PRN
Status: DISCONTINUED | OUTPATIENT
Start: 2020-07-23 | End: 2020-07-24 | Stop reason: HOSPADM

## 2020-07-23 RX ORDER — CALCIUM CARBONATE 750 MG/1
750 TABLET, CHEWABLE ORAL 3 TIMES DAILY PRN
Status: DISCONTINUED | OUTPATIENT
Start: 2020-07-23 | End: 2020-07-24 | Stop reason: HOSPADM

## 2020-07-23 RX ORDER — CLOPIDOGREL BISULFATE 75 MG/1
300 TABLET ORAL ONCE
Status: COMPLETED | OUTPATIENT
Start: 2020-07-23 | End: 2020-07-23

## 2020-07-23 RX ADMIN — LABETALOL 20 MG/4 ML (5 MG/ML) INTRAVENOUS SYRINGE 20 MG: at 05:09

## 2020-07-23 RX ADMIN — INSULIN LISPRO 5 UNITS: 100 INJECTION, SOLUTION INTRAVENOUS; SUBCUTANEOUS at 17:16

## 2020-07-23 RX ADMIN — CARVEDILOL 25 MG: 12.5 TABLET, FILM COATED ORAL at 21:28

## 2020-07-23 RX ADMIN — HYDROCODONE BITARTRATE AND ACETAMINOPHEN 1 TABLET: 7.5; 325 TABLET ORAL at 00:19

## 2020-07-23 RX ADMIN — CEFUROXIME SODIUM 1.5 G: 1.5 INJECTION, POWDER, FOR SOLUTION INTRAVENOUS at 00:39

## 2020-07-23 RX ADMIN — CLOPIDOGREL BISULFATE 300 MG: 75 TABLET ORAL at 08:31

## 2020-07-23 RX ADMIN — NICARDIPINE HYDROCHLORIDE 15 MG/HR: 0.1 INJECTION, SOLUTION INTRAVENOUS at 07:17

## 2020-07-23 RX ADMIN — VARENICLINE TARTRATE 1 MG: 0.5 TABLET, FILM COATED ORAL at 08:40

## 2020-07-23 RX ADMIN — ATORVASTATIN CALCIUM 40 MG: 40 TABLET, FILM COATED ORAL at 21:28

## 2020-07-23 RX ADMIN — AMLODIPINE BESYLATE 5 MG: 5 TABLET ORAL at 09:54

## 2020-07-23 RX ADMIN — INSULIN LISPRO 4 UNITS: 100 INJECTION, SOLUTION INTRAVENOUS; SUBCUTANEOUS at 08:32

## 2020-07-23 RX ADMIN — PANTOPRAZOLE SODIUM 40 MG: 40 TABLET, DELAYED RELEASE ORAL at 05:09

## 2020-07-23 RX ADMIN — LOSARTAN POTASSIUM 50 MG: 50 TABLET, FILM COATED ORAL at 12:41

## 2020-07-23 RX ADMIN — NICARDIPINE HYDROCHLORIDE 15 MG/HR: 0.1 INJECTION, SOLUTION INTRAVENOUS at 00:19

## 2020-07-23 RX ADMIN — INSULIN LISPRO 4 UNITS: 100 INJECTION, SOLUTION INTRAVENOUS; SUBCUTANEOUS at 12:44

## 2020-07-23 RX ADMIN — INSULIN LISPRO 4 UNITS: 100 INJECTION, SOLUTION INTRAVENOUS; SUBCUTANEOUS at 21:28

## 2020-07-23 RX ADMIN — ASPIRIN 81 MG: 81 TABLET, COATED ORAL at 08:32

## 2020-07-23 RX ADMIN — NICARDIPINE HYDROCHLORIDE 15 MG/HR: 0.1 INJECTION, SOLUTION INTRAVENOUS at 03:02

## 2020-07-23 RX ADMIN — CLONIDINE HYDROCHLORIDE 0.3 MG: 0.1 TABLET ORAL at 08:30

## 2020-07-23 RX ADMIN — INSULIN DETEMIR 10 UNITS: 100 INJECTION, SOLUTION SUBCUTANEOUS at 21:35

## 2020-07-23 RX ADMIN — CLONIDINE HYDROCHLORIDE 0.3 MG: 0.1 TABLET ORAL at 21:28

## 2020-07-23 RX ADMIN — LABETALOL 20 MG/4 ML (5 MG/ML) INTRAVENOUS SYRINGE 20 MG: at 21:33

## 2020-07-23 RX ADMIN — NICARDIPINE HYDROCHLORIDE 15 MG/HR: 0.1 INJECTION, SOLUTION INTRAVENOUS at 01:39

## 2020-07-23 RX ADMIN — NICARDIPINE HYDROCHLORIDE 15 MG/HR: 0.1 INJECTION, SOLUTION INTRAVENOUS at 08:45

## 2020-07-23 RX ADMIN — CARVEDILOL 25 MG: 12.5 TABLET, FILM COATED ORAL at 08:31

## 2020-07-23 RX ADMIN — INSULIN LISPRO 5 UNITS: 100 INJECTION, SOLUTION INTRAVENOUS; SUBCUTANEOUS at 12:44

## 2020-07-23 RX ADMIN — SODIUM CHLORIDE, PRESERVATIVE FREE 3 ML: 5 INJECTION INTRAVENOUS at 21:33

## 2020-07-23 RX ADMIN — NICARDIPINE HYDROCHLORIDE 15 MG/HR: 0.1 INJECTION, SOLUTION INTRAVENOUS at 05:52

## 2020-07-23 RX ADMIN — SODIUM CHLORIDE, PRESERVATIVE FREE 3 ML: 5 INJECTION INTRAVENOUS at 08:33

## 2020-07-23 RX ADMIN — PROMETHAZINE HYDROCHLORIDE 6.25 MG: 25 INJECTION INTRAMUSCULAR; INTRAVENOUS at 03:05

## 2020-07-23 RX ADMIN — SODIUM CHLORIDE 15 MG/HR: 9 INJECTION, SOLUTION INTRAVENOUS at 10:19

## 2020-07-23 RX ADMIN — ONDANSETRON 4 MG: 2 INJECTION INTRAMUSCULAR; INTRAVENOUS at 00:40

## 2020-07-23 RX ADMIN — INSULIN LISPRO 5 UNITS: 100 INJECTION, SOLUTION INTRAVENOUS; SUBCUTANEOUS at 08:33

## 2020-07-23 RX ADMIN — CARVEDILOL 25 MG: 12.5 TABLET, FILM COATED ORAL at 14:13

## 2020-07-23 RX ADMIN — NICARDIPINE HYDROCHLORIDE 15 MG/HR: 0.1 INJECTION, SOLUTION INTRAVENOUS at 04:41

## 2020-07-23 RX ADMIN — AMLODIPINE BESYLATE 10 MG: 10 TABLET ORAL at 08:32

## 2020-07-23 RX ADMIN — ISOSORBIDE MONONITRATE 30 MG: 30 TABLET, EXTENDED RELEASE ORAL at 08:32

## 2020-07-23 RX ADMIN — INSULIN LISPRO 4 UNITS: 100 INJECTION, SOLUTION INTRAVENOUS; SUBCUTANEOUS at 17:16

## 2020-07-23 RX ADMIN — VARENICLINE TARTRATE 1 MG: 0.5 TABLET, FILM COATED ORAL at 17:16

## 2020-07-23 RX ADMIN — ISOSORBIDE MONONITRATE 30 MG: 30 TABLET, EXTENDED RELEASE ORAL at 12:42

## 2020-07-24 VITALS
BODY MASS INDEX: 31.58 KG/M2 | TEMPERATURE: 98.3 F | HEIGHT: 64 IN | OXYGEN SATURATION: 97 % | WEIGHT: 185 LBS | DIASTOLIC BLOOD PRESSURE: 64 MMHG | HEART RATE: 86 BPM | RESPIRATION RATE: 14 BRPM | SYSTOLIC BLOOD PRESSURE: 153 MMHG

## 2020-07-24 LAB
CHOLEST SERPL-MCNC: 117 MG/DL (ref 0–200)
CYTO UR: NORMAL
GLUCOSE BLDC GLUCOMTR-MCNC: 169 MG/DL (ref 70–130)
GLUCOSE BLDC GLUCOMTR-MCNC: 286 MG/DL (ref 70–130)
HDLC SERPL-MCNC: 32 MG/DL (ref 40–60)
LAB AP CASE REPORT: NORMAL
LAB AP CLINICAL INFORMATION: NORMAL
LDLC SERPL CALC-MCNC: 41 MG/DL (ref 0–100)
LDLC/HDLC SERPL: 1.28 {RATIO}
PATH REPORT.FINAL DX SPEC: NORMAL
PATH REPORT.GROSS SPEC: NORMAL
TRIGL SERPL-MCNC: 220 MG/DL (ref 0–150)
VLDLC SERPL-MCNC: 44 MG/DL

## 2020-07-24 PROCEDURE — 63710000001 INSULIN LISPRO (HUMAN) PER 5 UNITS: Performed by: PHYSICIAN ASSISTANT

## 2020-07-24 PROCEDURE — 99024 POSTOP FOLLOW-UP VISIT: CPT | Performed by: PHYSICIAN ASSISTANT

## 2020-07-24 PROCEDURE — 99024 POSTOP FOLLOW-UP VISIT: CPT | Performed by: THORACIC SURGERY (CARDIOTHORACIC VASCULAR SURGERY)

## 2020-07-24 PROCEDURE — 82962 GLUCOSE BLOOD TEST: CPT

## 2020-07-24 PROCEDURE — 80061 LIPID PANEL: CPT | Performed by: PHYSICIAN ASSISTANT

## 2020-07-24 PROCEDURE — 25010000002 HYDRALAZINE PER 20 MG: Performed by: PHYSICIAN ASSISTANT

## 2020-07-24 PROCEDURE — 99232 SBSQ HOSP IP/OBS MODERATE 35: CPT | Performed by: INTERNAL MEDICINE

## 2020-07-24 RX ORDER — LOSARTAN POTASSIUM 50 MG/1
100 TABLET ORAL
Status: DISCONTINUED | OUTPATIENT
Start: 2020-07-25 | End: 2020-07-24 | Stop reason: HOSPADM

## 2020-07-24 RX ORDER — CLONIDINE HYDROCHLORIDE 0.3 MG/1
0.3 TABLET ORAL EVERY 12 HOURS SCHEDULED
Qty: 60 TABLET | Refills: 5 | Status: SHIPPED | OUTPATIENT
Start: 2020-07-24 | End: 2020-07-25

## 2020-07-24 RX ORDER — ISOSORBIDE MONONITRATE 60 MG/1
60 TABLET, EXTENDED RELEASE ORAL DAILY
Qty: 30 TABLET | Refills: 5 | Status: SHIPPED | OUTPATIENT
Start: 2020-07-25 | End: 2020-07-25

## 2020-07-24 RX ORDER — LOSARTAN POTASSIUM 50 MG/1
50 TABLET ORAL ONCE
Status: COMPLETED | OUTPATIENT
Start: 2020-07-24 | End: 2020-07-24

## 2020-07-24 RX ORDER — LOSARTAN POTASSIUM 100 MG/1
100 TABLET ORAL
Qty: 30 TABLET | Refills: 5 | Status: SHIPPED | OUTPATIENT
Start: 2020-07-25 | End: 2020-07-25

## 2020-07-24 RX ORDER — AMLODIPINE BESYLATE 10 MG/1
10 TABLET ORAL DAILY
Qty: 30 TABLET | Refills: 5 | Status: SHIPPED | OUTPATIENT
Start: 2020-07-25 | End: 2020-07-25

## 2020-07-24 RX ADMIN — LOSARTAN POTASSIUM 50 MG: 50 TABLET, FILM COATED ORAL at 08:36

## 2020-07-24 RX ADMIN — SODIUM CHLORIDE, PRESERVATIVE FREE 3 ML: 5 INJECTION INTRAVENOUS at 08:32

## 2020-07-24 RX ADMIN — ACETAMINOPHEN 650 MG: 325 TABLET, FILM COATED ORAL at 04:32

## 2020-07-24 RX ADMIN — CLONIDINE HYDROCHLORIDE 0.3 MG: 0.1 TABLET ORAL at 08:36

## 2020-07-24 RX ADMIN — CLOPIDOGREL BISULFATE 75 MG: 75 TABLET ORAL at 08:31

## 2020-07-24 RX ADMIN — INSULIN LISPRO 5 UNITS: 100 INJECTION, SOLUTION INTRAVENOUS; SUBCUTANEOUS at 08:31

## 2020-07-24 RX ADMIN — AMLODIPINE BESYLATE 10 MG: 10 TABLET ORAL at 08:31

## 2020-07-24 RX ADMIN — LOSARTAN POTASSIUM 50 MG: 50 TABLET, FILM COATED ORAL at 12:58

## 2020-07-24 RX ADMIN — ACETAMINOPHEN 650 MG: 325 TABLET, FILM COATED ORAL at 08:31

## 2020-07-24 RX ADMIN — ASPIRIN 81 MG: 81 TABLET, COATED ORAL at 08:31

## 2020-07-24 RX ADMIN — INSULIN LISPRO 5 UNITS: 100 INJECTION, SOLUTION INTRAVENOUS; SUBCUTANEOUS at 12:58

## 2020-07-24 RX ADMIN — VARENICLINE TARTRATE 1 MG: 0.5 TABLET, FILM COATED ORAL at 08:31

## 2020-07-24 RX ADMIN — ISOSORBIDE MONONITRATE 60 MG: 60 TABLET, EXTENDED RELEASE ORAL at 08:31

## 2020-07-24 RX ADMIN — PANTOPRAZOLE SODIUM 40 MG: 40 TABLET, DELAYED RELEASE ORAL at 05:25

## 2020-07-24 RX ADMIN — INSULIN LISPRO 6 UNITS: 100 INJECTION, SOLUTION INTRAVENOUS; SUBCUTANEOUS at 12:58

## 2020-07-24 RX ADMIN — HYDRALAZINE HYDROCHLORIDE 20 MG: 20 INJECTION INTRAMUSCULAR; INTRAVENOUS at 04:31

## 2020-07-24 RX ADMIN — CARVEDILOL 25 MG: 12.5 TABLET, FILM COATED ORAL at 05:25

## 2020-07-24 RX ADMIN — INSULIN LISPRO 2 UNITS: 100 INJECTION, SOLUTION INTRAVENOUS; SUBCUTANEOUS at 08:32

## 2020-07-25 ENCOUNTER — READMISSION MANAGEMENT (OUTPATIENT)
Dept: CALL CENTER | Facility: HOSPITAL | Age: 70
End: 2020-07-25

## 2020-07-25 RX ORDER — ISOSORBIDE MONONITRATE 60 MG/1
60 TABLET, EXTENDED RELEASE ORAL DAILY
Qty: 30 TABLET | Refills: 5 | Status: SHIPPED | OUTPATIENT
Start: 2020-07-25

## 2020-07-25 RX ORDER — LOSARTAN POTASSIUM 100 MG/1
100 TABLET ORAL
Qty: 30 TABLET | Refills: 5 | Status: SHIPPED | OUTPATIENT
Start: 2020-07-25 | End: 2020-08-05

## 2020-07-25 RX ORDER — AMLODIPINE BESYLATE 10 MG/1
10 TABLET ORAL DAILY
Qty: 30 TABLET | Refills: 5 | Status: SHIPPED | OUTPATIENT
Start: 2020-07-25 | End: 2020-10-05

## 2020-07-25 RX ORDER — CLONIDINE HYDROCHLORIDE 0.3 MG/1
0.3 TABLET ORAL EVERY 12 HOURS SCHEDULED
Qty: 60 TABLET | Refills: 5 | Status: SHIPPED | OUTPATIENT
Start: 2020-07-25 | End: 2020-08-05 | Stop reason: SDUPTHER

## 2020-07-25 NOTE — OUTREACH NOTE
Prep Survey      Responses   [facility] patient discharged from?  [facility]   Is LACE score < 7 ?  No   Eligibility  [facility]mt   Discharge diagnosis  carotid endarterectomy, left   COVID-19 Test Status  Negative   Does the patient have one of the following disease processes/diagnoses(primary or secondary)?  General Surgery   Does the patient have Home health ordered?  No   Is there a DME ordered?  No   Comments regarding appointments  see AVS for apmts   Medication alerts for this patient  see AVS for changes   Prep survey completed?  Yes          Vale Roblero RN

## 2020-07-29 ENCOUNTER — READMISSION MANAGEMENT (OUTPATIENT)
Dept: CALL CENTER | Facility: HOSPITAL | Age: 70
End: 2020-07-29

## 2020-07-29 NOTE — OUTREACH NOTE
General Surgery Week 1 Survey      Responses   [facility] patient discharged from?  [facility]   Does the patient have one of the following disease processes/diagnoses(primary or secondary)?  General Surgery   Is there a successful TCM telephone encounter documented?  No   Week 1 attempt successful?  No   Unsuccessful attempts  Attempt 1          Lena Alvarez RN

## 2020-07-30 ENCOUNTER — TELEPHONE (OUTPATIENT)
Dept: CARDIOLOGY | Facility: CLINIC | Age: 70
End: 2020-07-30

## 2020-07-30 NOTE — TELEPHONE ENCOUNTER
[facility]- nurse called and wanted to verify that the patient is on Coreg TID.  Spoke with Pat and confirmed that the chart did have Coreg TID.   They stated patient came in, in wheel chair and BP was 114/74.  The took it again standing 110/72   Sitting 90/50  Pat said as long as she's not dizzy or lightheaded the B/P was fine, we could address on Thursday during pt's apt.

## 2020-07-31 ENCOUNTER — READMISSION MANAGEMENT (OUTPATIENT)
Dept: CALL CENTER | Facility: HOSPITAL | Age: 70
End: 2020-07-31

## 2020-07-31 NOTE — OUTREACH NOTE
General Surgery Week 1 Survey      Responses   [facility] patient discharged from?  [place]   Does the patient have one of the following disease processes/diagnoses(primary or secondary)?  General Surgery   Is there a successful TCM telephone encounter documented?  No   Week 1 attempt successful?  Yes   Call start time  1119   Rescheduled  Revoked [RN called [phone]. The person that answered said it was not Jose and hung up.]   Call end time  1119          Irina Muñoz RN

## 2020-08-05 ENCOUNTER — OFFICE VISIT (OUTPATIENT)
Dept: CARDIOLOGY | Facility: CLINIC | Age: 70
End: 2020-08-05

## 2020-08-05 VITALS
HEART RATE: 63 BPM | WEIGHT: 184.2 LBS | SYSTOLIC BLOOD PRESSURE: 128 MMHG | BODY MASS INDEX: 31.45 KG/M2 | OXYGEN SATURATION: 98 % | DIASTOLIC BLOOD PRESSURE: 62 MMHG | HEIGHT: 64 IN

## 2020-08-05 DIAGNOSIS — I73.9 PAD (PERIPHERAL ARTERY DISEASE) (HCC): ICD-10-CM

## 2020-08-05 DIAGNOSIS — I10 ESSENTIAL HYPERTENSION: ICD-10-CM

## 2020-08-05 DIAGNOSIS — I25.10 CORONARY ARTERY DISEASE INVOLVING NATIVE CORONARY ARTERY OF NATIVE HEART WITHOUT ANGINA PECTORIS: Primary | ICD-10-CM

## 2020-08-05 DIAGNOSIS — I65.29 STENOSIS OF CAROTID ARTERY, UNSPECIFIED LATERALITY: ICD-10-CM

## 2020-08-05 PROCEDURE — 99214 OFFICE O/P EST MOD 30 MIN: CPT | Performed by: PHYSICIAN ASSISTANT

## 2020-08-05 RX ORDER — OLMESARTAN MEDOXOMIL 40 MG/1
40 TABLET ORAL DAILY
Qty: 30 TABLET | Refills: 5 | Status: SHIPPED | OUTPATIENT
Start: 2020-08-05 | End: 2020-10-05

## 2020-08-05 RX ORDER — CLONIDINE HYDROCHLORIDE 0.2 MG/1
0.2 TABLET ORAL EVERY 12 HOURS SCHEDULED
Qty: 60 TABLET | Refills: 5 | Status: SHIPPED | OUTPATIENT
Start: 2020-08-05 | End: 2020-08-27 | Stop reason: ALTCHOICE

## 2020-08-05 NOTE — PATIENT INSTRUCTIONS
How to Quarantine at Home  Information for Patients and Families    These instructions are for people with confirmed or suspected COVID-19 who do not need to be hospitalized and those with confirmed COVID-19 who were hospitalized and discharged to care for themselves at home.    If you were tested through the Health Department  The Health Department will monitor your wellbeing.  If it is determined that you do not need to be hospitalized and can be isolated at home, you will be monitored by staff from your local or state health department.     If you were tested through a Commercial Lab  You will need to monitor yourself and report changes in your symptoms to your doctor.  See the section below called Monitor Your Symptoms.    Follow these steps until a healthcare provider or local or state health department says you can return to your normal activities.    Stay home except to get medical care  • Restrict activities outside your home, except for getting medical care.   • Do not go to work, school, or public areas.   • Avoid using public transportation, ride-sharing, or taxis.    Separate yourself from other people and animals in your home  People  As much as possible, you should stay in a specific room and away from other people in your home. Also, you should use a separate bathroom, if available.    Animals  You should restrict contact with pets and other animals while you are sick with COVID-19, just like you would around other people. When possible, have another member of your household care for your animals while you are sick. If you are sick with COVID-19, avoid contact with your pet, including petting, snuggling, being kissed or licked, and sharing food. If you must care for your pet or be around animals while you are sick, wash your hands before and after you interact with pets and wear a facemask. See COVID-19 and Animals for more information.    Call ahead before visiting your doctor  If you have a medical  appointment, call the healthcare provider and tell them that you have or may have COVID-19. This information will help the healthcare provider’s office take steps to keep other people from getting infected or exposed.    Wear a facemask  You should wear a facemask when you are around other people (e.g., sharing a room or vehicle) or pets and before you enter a healthcare provider’s office.     If you are not able to wear a facemask (for example, because it causes trouble breathing), then people who live with you should not stay in the same room with you, or they should wear a facemask if they enter your room.    Cover your coughs and sneezes  • Cover your mouth and nose with a tissue when you cough or sneeze.   • Throw used tissues in a lined trash can.   • Immediately wash your hands with soap and water for at least 20 seconds or, if soap and water are not available, clean your hands with an alcohol-based hand  that contains at least 60% alcohol.    Clean your hands often  • Wash your hands often with soap and water for at least 20 seconds, especially after blowing your nose, coughing, or sneezing; going to the bathroom; and before eating or preparing food.     • If soap and water are not readily available, use an alcohol-based hand  with at least 60% alcohol, covering all surfaces of your hands and rubbing them together until they feel dry.    • Soap and water are the best option if hands are visibly dirty. Avoid touching your eyes, nose, and mouth with unwashed hands.    Avoid sharing personal household items  • You should not share dishes, drinking glasses, cups, eating utensils, towels, or bedding with other people or pets in your home.   • After using these items, they should be washed thoroughly with soap and water.    Clean all “high-touch” surfaces everyday  • High touch surfaces include counters, tabletops, doorknobs, bathroom fixtures, toilets, phones, keyboards, tablets, and bedside  tables.   • Also, clean any surfaces that may have blood, stool, or body fluids on them.   • Use a household cleaning spray or wipe, according to the label instructions. Labels contain instructions for safe and effective use of the cleaning product, including precautions you should take when applying the product, such as wearing gloves and making sure you have good ventilation during use of the product.    Monitor your symptoms  • Seek prompt medical attention if your illness is worsening (e.g., difficulty breathing).   • Before seeking care, call your healthcare provider and tell them that you have, or are being evaluated for, COVID-19.   • Put on a facemask before you enter the facility.     • These steps will help the healthcare provider’s office to keep other people in the office or waiting room from getting infected or exposed.   • Persons who are placed under active monitoring or facilitated self-monitoring should follow instructions provided by their local health department or occupational health professionals, as appropriate.  • If you have a medical emergency and need to call 911, notify the dispatch personnel that you have, or are being evaluated for COVID-19. If possible, put on a facemask before emergency medical services arrive.    Discontinuing home isolation  Patients with confirmed COVID-19 should remain under home isolation precautions until the risk of secondary transmission to others is thought to be low. The decision to discontinue home isolation precautions should be made on a case-by-case basis, in consultation with healthcare providers and state and local health departments.    The below content are for household members, intimate partners, and caregivers of a patient with symptomatic laboratory-confirmed COVID-19 or a patient under investigation:    Household members, intimate partners, and caregivers may have close contact with a person with symptomatic, laboratory-confirmed COVID-19 or a  person under investigation.     Close contacts should monitor their health; they should call their healthcare provider right away if they develop symptoms suggestive of COVID-19 (e.g., fever, cough, shortness of breath)     Close contacts should also follow these recommendations:  • Make sure that you understand and can help the patient follow their healthcare provider’s instructions for medication(s) and care. You should help the patient with basic needs in the home and provide support for getting groceries, prescriptions, and other personal needs.  • Monitor the patient’s symptoms. If the patient is getting sicker, call his or her healthcare provider and tell them that the patient has laboratory-confirmed COVID-19. This will help the healthcare provider’s office take steps to keep other people in the office or waiting room from getting infected. Ask the healthcare provider to call the local or [facility] health department for additional guidance. If the patient has a medical emergency and you need to call 911, notify the dispatch personnel that the patient has, or is being evaluated for COVID-19.  • Household members should stay in another room or be  from the patient as much as possible. Household members should use a separate bedroom and bathroom, if available.  • Prohibit visitors who do not have an essential need to be in the home.  • Household members should care for any pets in the home. Do not handle pets or other animals while sick.  For more information, see COVID-19 and Animals.  • Make sure that shared spaces in the home have good air flow, such as by an air conditioner or an opened window, weather permitting.  • Perform hand hygiene frequently. Wash your hands often with soap and water for at least 20 seconds or use an alcohol-based hand  that contains 60 to 95% alcohol, covering all surfaces of your hands and rubbing them together until they feel dry. Soap and water should be used  preferentially if hands are visibly dirty.  • Avoid touching your eyes, nose, and mouth with unwashed hands.  • The patient should wear a facemask when you are around other people. If the patient is not able to wear a facemask (for example, because it causes trouble breathing), you, as the caregiver, should wear a mask when you are in the same room as the patient.  • Wear a disposable facemask and gloves when you touch or have contact with the patient’s blood, stool, or body fluids, such as saliva, sputum, nasal mucus, vomit, or urine.   o Throw out disposable facemasks and gloves after using them. Do not reuse.  o When removing personal protective equipment, first remove and dispose of gloves. Then, immediately clean your hands with soap and water or alcohol-based hand . Next, remove and dispose of facemask, and immediately clean your hands again with soap and water or alcohol-based hand .  • Avoid sharing household items with the patient. You should not share dishes, drinking glasses, cups, eating utensils, towels, bedding, or other items. After the patient uses these items, you should wash them thoroughly (see below “Wash laundry thoroughly”).  • Clean all “high-touch” surfaces, such as counters, tabletops, doorknobs, bathroom fixtures, toilets, phones, keyboards, tablets, and bedside tables, every day. Also, clean any surfaces that may have blood, stool, or body fluids on them.   o Use a household cleaning spray or wipe, according to the label instructions. Labels contain instructions for safe and effective use of the cleaning product including precautions you should take when applying the product, such as wearing gloves and making sure you have good ventilation during use of the product.  • Wash laundry thoroughly.   o Immediately remove and wash clothes or bedding that have blood, stool, or body fluids on them.  o Wear disposable gloves while handling soiled items and keep soiled items away  from your body. Clean your hands (with soap and water or an alcohol-based hand ) immediately after removing your gloves.  o Read and follow directions on labels of laundry or clothing items and detergent. In general, using a normal laundry detergent according to washing machine instructions and dry thoroughly using the warmest temperatures recommended on the clothing label.  • Place all used disposable gloves, facemasks, and other contaminated items in a lined container before disposing of them with other household waste. Clean your hands (with soap and water or an alcohol-based hand ) immediately after handling these items. Soap and water should be used preferentially if hands are visibly dirty.  • Discuss any additional questions with your state or local health department or healthcare provider.    Adapted from information provided by the Centers for Disease Control and Prevention.  For more information, visit https://www.cdc.gov/coronavirus/2019-ncov/hcp/guidance-prevent-spread.htmlFat and Cholesterol Restricted Eating Plan  Getting too much fat and cholesterol in your diet may cause health problems. Choosing the right foods helps keep your fat and cholesterol at normal levels. This can keep you from getting certain diseases.  Your doctor may recommend an eating plan that includes:  · Total fat: ______% or less of total calories a day.  · Saturated fat: ______% or less of total calories a day.  · Cholesterol: less than _________mg a day.  · Fiber: ______g a day.  What are tips for following this plan?  Meal planning  · At meals, divide your plate into four equal parts:  ? Fill one-half of your plate with vegetables and green salads.  ? Fill one-fourth of your plate with whole grains.  ? Fill one-fourth of your plate with low-fat (lean) protein foods.  · Eat fish that is high in omega-3 fats at least two times a week. This includes mackerel, tuna, sardines, and salmon.  · Eat foods that are high  "in fiber, such as whole grains, beans, apples, broccoli, carrots, peas, and barley.  General tips    · Work with your doctor to lose weight if you need to.  · Avoid:  ? Foods with added sugar.  ? Fried foods.  ? Foods with partially hydrogenated oils.  · Limit alcohol intake to no more than 1 drink a day for nonpregnant women and 2 drinks a day for men. One drink equals 12 oz of beer, 5 oz of wine, or 1½ oz of hard liquor.  Reading food labels  · Check food labels for:  ? Trans fats.  ? Partially hydrogenated oils.  ? Saturated fat (g) in each serving.  ? Cholesterol (mg) in each serving.  ? Fiber (g) in each serving.  · Choose foods with healthy fats, such as:  ? Monounsaturated fats.  ? Polyunsaturated fats.  ? Omega-3 fats.  · Choose grain products that have whole grains. Look for the word \"whole\" as the first word in the ingredient list.  Cooking  · Cook foods using low-fat methods. These include baking, boiling, grilling, and broiling.  · Eat more home-cooked foods. Eat at restaurants and buffets less often.  · Avoid cooking using saturated fats, such as butter, cream, palm oil, palm kernel oil, and coconut oil.  Recommended foods    Fruits  · All fresh, canned (in natural juice), or frozen fruits.  Vegetables  · Fresh or frozen vegetables (raw, steamed, roasted, or grilled). Green salads.  Grains  · Whole grains, such as whole wheat or whole grain breads, crackers, cereals, and pasta. Unsweetened oatmeal, bulgur, barley, quinoa, or brown rice. Corn or whole wheat flour tortillas.  Meats and other protein foods  · Ground beef (85% or leaner), grass-fed beef, or beef trimmed of fat. Skinless chicken or turkey. Ground chicken or turkey. Pork trimmed of fat. All fish and seafood. Egg whites. Dried beans, peas, or lentils. Unsalted nuts or seeds. Unsalted canned beans. Nut butters without added sugar or oil.  Dairy  · Low-fat or nonfat dairy products, such as skim or 1% milk, 2% or reduced-fat cheeses, low-fat and " fat-free ricotta or cottage cheese, or plain low-fat and nonfat yogurt.  Fats and oils  · Tub margarine without trans fats. Light or reduced-fat mayonnaise and salad dressings. Avocado. Olive, canola, sesame, or safflower oils.  The items listed above may not be a complete list of foods and beverages you can eat. Contact a dietitian for more information.  Foods to avoid  Fruits  · Canned fruit in heavy syrup. Fruit in cream or butter sauce. Fried fruit.  Vegetables  · Vegetables cooked in cheese, cream, or butter sauce. Fried vegetables.  Grains  · White bread. White pasta. White rice. Cornbread. Bagels, pastries, and croissants. Crackers and snack foods that contain trans fat and hydrogenated oils.  Meats and other protein foods  · Fatty cuts of meat. Ribs, chicken wings, smith, sausage, bologna, salami, chitterlings, fatback, hot dogs, bratwurst, and packaged lunch meats. Liver and organ meats. Whole eggs and egg yolks. Chicken and turkey with skin. Fried meat.  Dairy  · Whole or 2% milk, cream, half-and-half, and cream cheese. Whole milk cheeses. Whole-fat or sweetened yogurt. Full-fat cheeses. Nondairy creamers and whipped toppings. Processed cheese, cheese spreads, and cheese curds.  Beverages  · Alcohol. Sugar-sweetened drinks such as sodas, lemonade, and fruit drinks.  Fats and oils  · Butter, stick margarine, lard, shortening, ghee, or smith fat. Coconut, palm kernel, and palm oils.  Sweets and desserts  · Corn syrup, sugars, honey, and molasses. Candy. Jam and jelly. Syrup. Sweetened cereals. Cookies, pies, cakes, donuts, muffins, and ice cream.  The items listed above may not be a complete list of foods and beverages you should avoid. Contact a dietitian for more information.  Summary  · Choosing the right foods helps keep your fat and cholesterol at normal levels. This can keep you from getting certain diseases.  · At meals, fill one-half of your plate with vegetables and green salads.  · Eat high-fiber  "foods, like whole grains, beans, apples, carrots, peas, and barley.  · Limit added sugar, saturated fats, alcohol, and fried foods.  This information is not intended to replace advice given to you by your health care provider. Make sure you discuss any questions you have with your health care provider.  Document Released: 06/18/2013 Document Revised: 08/21/2019 Document Reviewed: 09/04/2018  [employer] Patient Education © 2020 [employer]  BMI for Adults    Body mass index (BMI) is a number that is calculated from a person's weight and height. BMI may help to estimate how much of a person's weight is composed of fat. BMI can help identify those who may be at higher risk for certain medical problems.  How is BMI used with adults?  BMI is used as a screening tool to identify possible weight problems. It is used to check whether a person is obese, overweight, healthy weight, or underweight.  How is BMI calculated?  BMI measures your weight and compares it to your height. This can be done either in English ([place] or metric measurements. Note that charts are available to help you find your BMI quickly and easily without having to do these calculations yourself.  To calculate your BMI in English ([place] measurements, your health care provider will:  1. Measure your weight in pounds (lb).  2. Multiply the number of pounds by 703.  ? For example, for a person who weighs 180 lb, multiply that number by 703, which equals 126,540.  3. Measure your height in inches (in). Then multiply that number by itself to get a measurement called \"inches squared.\"  ? For example, for a person who is 70 in tall, the \"inches squared\" measurement is 70 in x 70 in, which equals 4900 inches squared.  4. Divide the total from Step 2 (number of lb x 703) by the total from Step 3 (inches squared): 126,540 ÷ 4900 = 25.8. This is your BMI.  To calculate your BMI in metric measurements, your health care provider will:  1. Measure your weight in " "kilograms (kg).  2. Measure your height in meters (m). Then multiply that number by itself to get a measurement called \"meters squared.\"  ? For example, for a person who is 1.75 m tall, the \"meters squared\" measurement is 1.75 m x 1.75 m, which is equal to 3.1 meters squared.  3. Divide the number of kilograms (your weight) by the meters squared number. In this example: 70 ÷ 3.1 = 22.6. This is your BMI.  How is BMI interpreted?  To interpret your results, your health care provider will use BMI charts to identify whether you are underweight, normal weight, overweight, or obese. The following guidelines will be used:  · Underweight: BMI less than 18.5.  · Normal weight: BMI between 18.5 and 24.9.  · Overweight: BMI between 25 and 29.9.  · Obese: BMI of 30 and above.  Please note:  · Weight includes both fat and muscle, so someone with a muscular build, such as an athlete, may have a BMI that is higher than 24.9. In cases like these, BMI is not an accurate measure of body fat.  · To determine if excess body fat is the cause of a BMI of 25 or higher, further assessments may need to be done by a health care provider.  · BMI is usually interpreted in the same way for men and women.  Why is BMI a useful tool?  BMI is useful in two ways:  · Identifying a weight problem that may be related to a medical condition, or that may increase the risk for medical problems.  · Promoting lifestyle and diet changes in order to reach a healthy weight.  Summary  · Body mass index (BMI) is a number that is calculated from a person's weight and height.  · BMI may help to estimate how much of a person's weight is composed of fat. BMI can help identify those who may be at higher risk for certain medical problems.  · BMI can be measured using English measurements or metric measurements.  · To interpret your results, your health care provider will use BMI charts to identify whether you are underweight, normal weight, overweight, or " obese.  This information is not intended to replace advice given to you by your health care provider. Make sure you discuss any questions you have with your health care provider.  Document Released: 08/29/2005 Document Revised: 11/30/2018 Document Reviewed: 10/31/2018  [employer] Patient Education © 2020 [employer]

## 2020-08-05 NOTE — PROGRESS NOTES
Problem list     Subjective   Lyndsay Franco is a 70 y.o. female     Chief Complaint   Patient presents with   • Follow-up   PROBLEM LIST:         1.  Coronary artery disease  1.1 history of multiple stenting procedures in 2004 and  2016, inadequate data  1.2 stress test April 2018 demonstrates no evidence of ischemia and preserved LV function  1.31.  Stress test Scintigraphy demonstrates ischemia involving the inferoseptal segment, the basilar inferolateral segment, and the entire inferior wall.  1.4 cardiac catheterization March 2019 demonstrated chronic total occlusion of the RCA.  Nonobstructive LAD with high-grade stenosis involving the circumflex and obtuse marginal.  Angioplasty and stenting of the LAD and circumflex and stenting of the obtuse marginal.  Medical management recommended in recanalization of the  if symptoms did not improve  1.5 Successful  PCI RCA laser atherectomy and KRISTINE x 1 via LRA per Dr. Kwong at [facility] on 8-14-19  2.  Peripheral vascular disease  2.1 history of iliac stenting bilaterally    2.2 follow-up femorofemoral bypass by Dr. Dupont at [facility] in [place]  2.2 follow-up CT angiogram May 2018 demonstrates occluded iliacs with femorofemoral bypass noted but no discussion on report in regards to patency or disease  2.3 intermittent claudication  2.4 questionable left renal artery stenosis  2.5 right renal artery stenting June 2018 by Dr. Mcallister and Dr. Dupont with no significant disease of the lower extremities identified  3.  Labile hypertension  4.  Infrarenal abdominal aortic aneurysm measuring 3 cm by CT scan May 2018  4.1 infrarenal abdominal aortic aneurysm measured by duplex at 3.4 cm  5.  Type 2 diabetes mellitus  6.  Dyslipidemia  7.  Chronic tobacco use  8.  Carotid artery stenosis  8.1 carotid duplex March 2019 demonstrates 50% left internal carotid artery stenosis on the left and less than 50% on the right with antegrade vertebral flow  8.2 carotid  duplex 2020 June 2020 with a 50 to 75% left internal carotid artery stenosis.  8.3 left carotid enterectomy July 2020 by Dr. Mcallister  8.4 acute thrombus postoperatively involving the left carotid with right-sided hemineglect.  Patient underwent repeat surgery with thrombectomy and closure.    HPI    Patient is a 70-year-old female the presents back from the hospital.  Patient has done remarkably well.  She underwent left carotid endarterectomy because of significant high-grade disease.  Unfortunately, she developed an acute embolic event to that area.  Patient had right-sided hemineglect.  Patient was rushed back to the operating room and had thrombectomy performed.  She has since done well.    She feels well.  She does not have any signs or symptoms to suggest long-term injury.  She feels well.  She has had issues in regards to blood pressure control.  It has fluctuated significantly.  After taking medications, she notices that her blood pressure will drop significantly.  She feels profound levels of fatigue.    She does not describe chest pain and her dyspnea is mild.  She does not have progressive shortness of breath.  No PND orthopnea.    She does not describe palpitations or dysrhythmic symptoms otherwise and is doing well      Current Outpatient Medications on File Prior to Visit   Medication Sig Dispense Refill   • amLODIPine (NORVASC) 10 MG tablet Take 1 tablet by mouth Daily. 30 tablet 5   • aspirin 81 MG tablet Take 81 mg by mouth 2 (Two) Times a Day. 30 tablet 11   • atorvastatin (LIPITOR) 40 MG tablet TAKE 1 TABLET BY MOUTH DAILY. 30 tablet 11   • carvedilol (COREG) 25 MG tablet Take 1 tablet by mouth 3 (Three) Times a Day. 90 tablet 11   • CHANTIX CONTINUING MONTH KARIE 1 MG tablet AS PRESCRIBED (Patient taking differently: Take 1 mg by mouth 2 (Two) Times a Day. As prescribed) 56 tablet 2   • cloNIDine (CATAPRES) 0.3 MG tablet Take 1 tablet by mouth Every 12 (Twelve) Hours. 60 tablet 5   • clopidogrel  (PLAVIX) 75 MG tablet Take 75 mg by mouth Daily.     • furosemide (LASIX) 20 MG tablet Take 20 mg by mouth As Needed.     • HYDROcodone-acetaminophen (NORCO)  MG per tablet prn     • hydrOXYzine (ATARAX) 10 MG tablet Take 10 mg by mouth 3 (Three) Times a Day As Needed for Itching.     • isosorbide mononitrate (IMDUR) 60 MG 24 hr tablet Take 1 tablet by mouth Daily. 30 tablet 5   • metFORMIN (GLUCOPHAGE) 850 MG tablet Take 850 mg by mouth 3 (Three) Times a Day.     • nitroglycerin (NITROSTAT) 0.4 MG SL tablet Take 1 tablet by mouth As Needed for Chest Pain. 25 tablet 1   • pantoprazole (PROTONIX) 40 MG EC tablet Take 40 mg by mouth Daily.     • promethazine (PHENERGAN) 12.5 MG tablet As Needed.     • senna (SENOKOT) 8.6 MG tablet tablet Take  by mouth Daily As Needed.     • traZODone (DESYREL) 50 MG tablet Take 50 mg by mouth Every Night.     • [DISCONTINUED] losartan (COZAAR) 100 MG tablet Take 1 tablet by mouth Daily. 30 tablet 5     No current facility-administered medications on file prior to visit.        Patient has no known allergies.    Past Medical History:   Diagnosis Date   • Anxiety    • Arthritis    • Coronary artery disease     stents aug 10, 2020  maker   • Depression    • Diabetes mellitus (CMS/HCC)     type dm, diagnosed approx age 47, check blood sugar bid   • Frequent headaches    • GERD (gastroesophageal reflux disease)    • Hiatal hernia    • History of anemia    • Hyperlipidemia    • Hypertension    • Myocardial infarction (CMS/HCC)     patient unsure of date   • Non-functioning kidney     Left    • Peripheral vascular disease (CMS/HCC)    • Renal artery aneurysm (CMS/HCC)    • Stenosis of carotid artery 3/11/2020   • Systolic ejection murmur 3/27/2018   • Wears dentures     top, partial bottom   • Wears reading eyeglasses        Social History     Socioeconomic History   • Marital status:      Spouse name: Not on file   • Number of children: 3   • Years of education: Not on  file   • Highest education level: Not on file   Occupational History   • Occupation: [employer]     Comment: Disabled-PVD   Tobacco Use   • Smoking status: Former Smoker     Packs/day: 0.75     Years: 40.00     Pack years: 30.00     Types: Cigarettes, Electronic Cigarette   • Smokeless tobacco: Never Used   • Tobacco comment: Quit Aug 2019   Substance and Sexual Activity   • Alcohol use: No   • Drug use: No   • Sexual activity: Defer   Social History Narrative    Lives in [place] alone.        Family History   Adopted: Yes   Problem Relation Age of Onset   • No Known Problems Mother    • No Known Problems Father        Review of Systems   Constitutional: Positive for fatigue.   HENT: Negative for congestion, rhinorrhea and sore throat.    Eyes: Positive for visual disturbance (reading glasses).   Respiratory: Positive for shortness of breath (w/ normal daily activity ). Negative for chest tightness.    Cardiovascular: Negative for chest pain (Denies CP), palpitations and leg swelling (edema after surgery, none since).        States BP is fluctuating a lot      Gastrointestinal: Positive for diarrhea (loose stool ), nausea and vomiting (every other day ). Negative for abdominal pain, blood in stool and constipation.   Genitourinary: Negative for difficulty urinating, dysuria, frequency, hematuria and urgency.   Musculoskeletal: Positive for arthralgias and back pain. Negative for neck pain.   Skin: Negative for rash and wound.   Allergic/Immunologic: Positive for environmental allergies. Negative for food allergies.   Neurological: Positive for weakness, light-headedness (When she's walking around or up on her feet too long), numbness (thinks due to low BS) and headaches. Negative for dizziness and syncope.   Hematological: Bruises/bleeds easily.   Psychiatric/Behavioral: Positive for sleep disturbance.   All other systems reviewed and are negative.      Objective   Vitals:    08/05/20 1331    "  BP: 128/62   Pulse: 63   SpO2: 98%   Weight: 83.6 kg (184 lb 3.2 oz)   Height: 162.6 cm (64\")      /62   Pulse 63   Ht 162.6 cm (64\")   Wt 83.6 kg (184 lb 3.2 oz)   SpO2 98%   BMI 31.62 kg/m²     Lab Results (most recent)     None          Physical Exam   Constitutional: She is oriented to person, place, and time. She appears well-developed and well-nourished. No distress.   HENT:   Head: Normocephalic and atraumatic.   Eyes: Conjunctivae are normal. Right eye exhibits no discharge. Left eye exhibits no discharge. No scleral icterus.   Neck: No JVD present.   Endarterectomy scar noted   Cardiovascular: Normal rate, regular rhythm, normal heart sounds and intact distal pulses. Exam reveals no gallop and no friction rub.   No murmur heard.  Pulmonary/Chest: Effort normal and breath sounds normal. No respiratory distress. She has no wheezes. She has no rales. She exhibits no tenderness.   Musculoskeletal: Normal range of motion. She exhibits no edema.   Neurological: She is alert and oriented to person, place, and time. No cranial nerve deficit.   Skin: Skin is warm and dry. No rash noted. No erythema. No pallor.   Psychiatric: She has a normal mood and affect. Her behavior is normal.   Nursing note and vitals reviewed.      Procedure   Procedures       Assessment/Plan     Problems Addressed this Visit        Cardiovascular and Mediastinum    Coronary artery disease involving native coronary artery of native heart without angina pectoris - Primary    Essential hypertension    Relevant Medications    olmesartan (Benicar) 40 MG tablet    PAD (peripheral artery disease) (CMS/[facility])    Stenosis of carotid artery              Recommendation  1.  Patient with coronary artery disease and history of significant vasculopathy.  She has had multiple interventions involving the neck and lower extremities as well as coronary disease.  I have had an extensive discussion with the patient.  We must become aggressive in risk " factor modification to prevent any future events.  Patient is on moderate dose statin and is on Chantix to help with smoking cessation.  I would like to check lipid parameters.  We may consider Repatha or Praluent and this is pending patient's cholesterol levels    2.  Patient doing well otherwise.  In regards to treating hypertension, would like to stop losartan and transition to Benicar which hopefully will be more potent in controlling her blood pressure.  I would like to titrate her clonidine down.  We will decrease clonidine to 0.2 mg.  Our hope is that we could stop this medication in the future and use other maintenance therapy    3.  Instructions given for her to call back in 1 week with blood pressure readings as discussed    4.  We will see her back for follow-up in 1 to 2 months officially.  She will follow with primary as scheduled       Lyndsay Franco  reports that she has quit smoking. Her smoking use included cigarettes and electronic cigarette. She has a 30.00 pack-year smoking history. She has never used smokeless tobacco.     Patient's Body mass index is 31.62 kg/m². BMI is above normal parameters. Recommendations include: educational material.       Electronically signed by:

## 2020-08-20 ENCOUNTER — OFFICE VISIT (OUTPATIENT)
Dept: CARDIAC SURGERY | Facility: CLINIC | Age: 70
End: 2020-08-20

## 2020-08-20 VITALS
TEMPERATURE: 97.8 F | OXYGEN SATURATION: 98 % | HEART RATE: 60 BPM | BODY MASS INDEX: 31.58 KG/M2 | WEIGHT: 185 LBS | SYSTOLIC BLOOD PRESSURE: 119 MMHG | HEIGHT: 64 IN | DIASTOLIC BLOOD PRESSURE: 60 MMHG

## 2020-08-20 DIAGNOSIS — I65.22 CAROTID STENOSIS, ASYMPTOMATIC, LEFT: Primary | ICD-10-CM

## 2020-08-20 PROCEDURE — 99024 POSTOP FOLLOW-UP VISIT: CPT | Performed by: NURSE PRACTITIONER

## 2020-08-20 NOTE — PROGRESS NOTES
[facility] Cardiothoracic Surgery Follow-Up Note    Name:  Lyndsay Franco  MRN Number:  [id number]  Date of Encounter:  08/20/2020    Referred By:  No ref. provider found  PCP:  Pamela Otoole MD    Chief Complaint:    Chief Complaint   Patient presents with   • Post-op     Hospital follow up s/p left carotid endarterectomy 7/22/20.   • Carotid Artery Disease       History of Present Illness:    Ms. Lyndsay Franco is a pleasant 70 y.o. female with a history of carotid artery disease s/p left CEA 7/22/2020 per Dr. Mcallister, with subsequent emergency return to the operating room for thrombectomy of the left internal carotid artery with pericardial patch closure who returns the office today for postoperative exam.  The patient denies visual disturbances, unilateral weakness, dysphagia and dysarthria.  The patient reports that she has had some left-sided ear pain since the surgery, otherwise she is doing well.    Review of Systems:  Review of Systems   Constitution: Negative for chills, fever and malaise/fatigue.   Eyes: Negative for visual disturbance.   Cardiovascular: Negative for chest pain, dyspnea on exertion and leg swelling.   Respiratory: Negative for hemoptysis and shortness of breath.    Skin: Negative for poor wound healing.   Gastrointestinal: Negative for abdominal pain.   Neurological: Negative for weakness.   Psychiatric/Behavioral: Negative for altered mental status.       Past Medical History:    Past Medical History:   Diagnosis Date   • Anxiety    • Arthritis    • Coronary artery disease     stents aug 10, 2020  maker   • Depression    • Diabetes mellitus (CMS/HCC)     type dm, diagnosed approx age 47, check blood sugar bid   • Frequent headaches    • GERD (gastroesophageal reflux disease)    • Hiatal hernia    • History of anemia    • Hyperlipidemia    • Hypertension    • Myocardial infarction (CMS/HCC)     patient unsure of date   • Non-functioning kidney     Left    •  Peripheral vascular disease (CMS/HCC)    • Renal artery aneurysm (CMS/HCC)    • Stenosis of carotid artery 3/11/2020   • Systolic ejection murmur 3/27/2018   • Wears dentures     top, partial bottom   • Wears reading eyeglasses        Past Surgical History:    Past Surgical History:   Procedure Laterality Date   • AORTAGRAM N/A 6/19/2018    Procedure: AORTAGRAM WITHOUT RUNOFFS WITH STENT, RIGHT FEM CUT DOWN;  Surgeon: Quang Mcallister MD;  Location:  [facility];  Service: Vascular   • BREAST CYST EXCISION      left breast biopsy   • CARDIAC CATHETERIZATION      3/2019 -- 2 stents    • CAROTID ENDARTERECTOMY Left 7/22/2020    Procedure: CAROTID ENDARTERECTOMY WITH EEG  LEFT;  Surgeon: Quang Mcallister MD;  Location:  [facility];  Service: Vascular;  Laterality: Left;   • CAROTID ENDARTERECTOMY Left 7/22/2020    Procedure: CAROTID ENDARTERECTOMY WITH EEG LEFT;  Surgeon: Quang Mcallister MD;  Location:  [facility];  Service: Vascular;  Laterality: Left;   • CHOLECYSTECTOMY     • COLONOSCOPY      5 years ago   • CORONARY STENT PLACEMENT      2 stents in heart -last stent was aug 2019   • FEMORAL FEMORAL BYPASS     • HEMORROIDECTOMY     • HYSTERECTOMY     • NECK SURGERY     • RENAL ARTERY STENT         Patient Active Problem List   Diagnosis   • Coronary artery disease involving native coronary artery of native heart without angina pectoris   • Essential hypertension   • Mixed hyperlipidemia   • Diabetes mellitus, uncontrolled, A1c 9.7   • Chest pain   • Shortness of breath   • PAD (peripheral artery disease) (CMS/HCC)   • Decreased pedal pulses   • Myalgia   • Carotid bruit   • Systolic ejection murmur   • Renal artery stenosis (CMS/HCC)   • Claudication (CMS/HCC)   • PVD (peripheral vascular disease) with claudication (CMS/HCC)   • Abnormal EKG   • Arm pain, anterior, right   • Hematoma   • S/P cardiac cath   • Stenosis of carotid artery   • AAA (abdominal aortic aneurysm) without rupture (CMS/HCC)   • RUQ  pain   • Carotid stenosis, asymptomatic, status post left CEA     Social History     Tobacco Use   • Smoking status: Former Smoker     Packs/day: 0.75     Years: 40.00     Pack years: 30.00     Types: Cigarettes, Electronic Cigarette   • Smokeless tobacco: Never Used   • Tobacco comment: Quit Aug 2019   Substance Use Topics   • Alcohol use: No   • Drug use: No     Family History   Adopted: Yes   Problem Relation Age of Onset   • No Known Problems Mother    • No Known Problems Father        Medications:      Current Outpatient Medications:   •  amLODIPine (NORVASC) 10 MG tablet, Take 1 tablet by mouth Daily., Disp: 30 tablet, Rfl: 5  •  aspirin 81 MG tablet, Take 81 mg by mouth 2 (Two) Times a Day., Disp: 30 tablet, Rfl: 11  •  atorvastatin (LIPITOR) 40 MG tablet, TAKE 1 TABLET BY MOUTH DAILY., Disp: 30 tablet, Rfl: 11  •  carvedilol (COREG) 25 MG tablet, Take 1 tablet by mouth 3 (Three) Times a Day., Disp: 90 tablet, Rfl: 11  •  CHANTIX CONTINUING MONTH KARIE 1 MG tablet, AS PRESCRIBED (Patient taking differently: Take 1 mg by mouth 2 (Two) Times a Day. As prescribed), Disp: 56 tablet, Rfl: 2  •  cloNIDine (CATAPRES) 0.2 MG tablet, Take 1 tablet by mouth Every 12 (Twelve) Hours., Disp: 60 tablet, Rfl: 5  •  clopidogrel (PLAVIX) 75 MG tablet, Take 75 mg by mouth Daily., Disp: , Rfl:   •  furosemide (LASIX) 20 MG tablet, Take 20 mg by mouth As Needed., Disp: , Rfl:   •  HYDROcodone-acetaminophen (NORCO)  MG per tablet, prn, Disp: , Rfl:   •  hydrOXYzine (ATARAX) 10 MG tablet, Take 10 mg by mouth 3 (Three) Times a Day As Needed for Itching., Disp: , Rfl:   •  isosorbide mononitrate (IMDUR) 60 MG 24 hr tablet, Take 1 tablet by mouth Daily., Disp: 30 tablet, Rfl: 5  •  metFORMIN (GLUCOPHAGE) 850 MG tablet, Take 850 mg by mouth 3 (Three) Times a Day., Disp: , Rfl:   •  nitroglycerin (NITROSTAT) 0.4 MG SL tablet, Take 1 tablet by mouth As Needed for Chest Pain., Disp: 25 tablet, Rfl: 1  •  olmesartan (Benicar) 40 MG  "tablet, Take 1 tablet by mouth Daily., Disp: 30 tablet, Rfl: 5  •  pantoprazole (PROTONIX) 40 MG EC tablet, Take 40 mg by mouth Daily., Disp: , Rfl:   •  promethazine (PHENERGAN) 12.5 MG tablet, As Needed., Disp: , Rfl:   •  senna (SENOKOT) 8.6 MG tablet tablet, Take  by mouth Daily As Needed., Disp: , Rfl:   •  traZODone (DESYREL) 50 MG tablet, Take 50 mg by mouth Every Night., Disp: , Rfl:     Allergies:  No Known Allergies    Physical Exam:  Vital Signs:    Vitals:    08/20/20 1455   BP: 119/60   BP Location: Right arm   Patient Position: Sitting   Pulse: 60   Temp: 97.8 °F (36.6 °C)   SpO2: 98%   Weight: 83.9 kg (185 lb)   Height: 162.6 cm (64\")       Physical Exam   Gen- NAD, pleasant, cooperative  CV- Regular rate and rhythm, no murmur, gallop or rub  Pulm- Clear to auscultation bilateral without wheeze or rhonchi  GI- Soft, normoactive bowel sounds, non-tender  Ext- Without edema,   Incision- Well approximated left neck incision with no erythema, drainage or dehiscence noted.  Neuro- CN II- XII grossly intact, tongue midline, voice normal.    Labs/Imaging:  No imaging was obtained in the office today.    Assessment / Plan:  Ms. Lyndsay Franco is a pleasant 70 y.o. female with a history of carotid artery disease s/p left CEA 7/22/2020 per Dr. Mcallister, with subsequent emergency return to the operating room for thrombectomy of the left internal carotid artery with pericardial patch closure who returns the office today for postoperative exam.  The patient is having stable postoperative course, her incisions are healing well, she is neurologically intact and her tongue is midline.  I am uncertain as to what is causing the patient pain in her ear and less she is experiencing some referred nerve pain from her incision sites.  I have encouraged the patient to come back into the office if this does not resolve within the next several months.  At this time, I will plan to see the patient back in approximately 1 year " with repeat bilateral carotid duplex.      Please note, this document was produced using voice recognition software.    MALIKA Cordoba  [facility] Cardiothoracic Surgery

## 2020-08-27 ENCOUNTER — TELEPHONE (OUTPATIENT)
Dept: CARDIOLOGY | Facility: CLINIC | Age: 70
End: 2020-08-27

## 2020-08-27 RX ORDER — CLONIDINE 0.2 MG/24H
1 PATCH, EXTENDED RELEASE TRANSDERMAL WEEKLY
Qty: 4 PATCH | Refills: 11 | Status: SHIPPED | OUTPATIENT
Start: 2020-08-27 | End: 2020-10-05

## 2020-08-27 NOTE — TELEPHONE ENCOUNTER
Patient called and is having a lot of fluctuating with BP. Highs in the 200/100 range and lows of 100/50s. She gets very lightheaded especially with position changes. I asked her if she mentioned this to staff when she went for follow up on endarterectomy and she said she didn't know. I did see from the office note that it didn't appear she mentioned it and her BP at that time was 119/60. Per Fuentes, pt instructed to stop her Clonidine 0.2mg tablet and we will start a Clonidine 0.2mg patch to see if this will modulate her BP's. Pt verbalized understanding. She will monitor vitals and symptoms at home and call if no improvement. She will also keep follow up as scheduled in October.  CALVIN ALVAREZ

## 2020-10-05 ENCOUNTER — OFFICE VISIT (OUTPATIENT)
Dept: CARDIOLOGY | Facility: CLINIC | Age: 70
End: 2020-10-05

## 2020-10-05 VITALS
HEIGHT: 64 IN | SYSTOLIC BLOOD PRESSURE: 180 MMHG | TEMPERATURE: 96.9 F | DIASTOLIC BLOOD PRESSURE: 82 MMHG | HEART RATE: 87 BPM | OXYGEN SATURATION: 96 % | WEIGHT: 185 LBS | BODY MASS INDEX: 31.58 KG/M2

## 2020-10-05 DIAGNOSIS — I73.9 PAD (PERIPHERAL ARTERY DISEASE) (HCC): ICD-10-CM

## 2020-10-05 DIAGNOSIS — E78.5 DYSLIPIDEMIA: ICD-10-CM

## 2020-10-05 DIAGNOSIS — I10 ESSENTIAL HYPERTENSION: ICD-10-CM

## 2020-10-05 DIAGNOSIS — I25.10 CORONARY ARTERY DISEASE INVOLVING NATIVE CORONARY ARTERY OF NATIVE HEART WITHOUT ANGINA PECTORIS: Primary | ICD-10-CM

## 2020-10-05 PROCEDURE — 99214 OFFICE O/P EST MOD 30 MIN: CPT | Performed by: PHYSICIAN ASSISTANT

## 2020-10-05 RX ORDER — LISINOPRIL 40 MG/1
40 TABLET ORAL DAILY
Qty: 30 TABLET | Refills: 11 | Status: SHIPPED | OUTPATIENT
Start: 2020-10-05 | End: 2021-01-01 | Stop reason: SDUPTHER

## 2020-10-05 RX ORDER — CLONIDINE HYDROCHLORIDE 0.2 MG/1
0.2 TABLET ORAL 2 TIMES DAILY
Qty: 60 TABLET | Refills: 5 | Status: SHIPPED | OUTPATIENT
Start: 2020-10-05 | End: 2020-01-01 | Stop reason: SDUPTHER

## 2020-10-05 RX ORDER — GLIMEPIRIDE 1 MG/1
1 TABLET ORAL DAILY
COMMUNITY
Start: 2020-07-30

## 2020-10-05 NOTE — PATIENT INSTRUCTIONS
"Fat and Cholesterol Restricted Eating Plan  Getting too much fat and cholesterol in your diet may cause health problems. Choosing the right foods helps keep your fat and cholesterol at normal levels. This can keep you from getting certain diseases.  Your doctor may recommend an eating plan that includes:  · Total fat: ______% or less of total calories a day.  · Saturated fat: ______% or less of total calories a day.  · Cholesterol: less than _________mg a day.  · Fiber: ______g a day.  What are tips for following this plan?  Meal planning  · At meals, divide your plate into four equal parts:  ? Fill one-half of your plate with vegetables and green salads.  ? Fill one-fourth of your plate with whole grains.  ? Fill one-fourth of your plate with low-fat (lean) protein foods.  · Eat fish that is high in omega-3 fats at least two times a week. This includes mackerel, tuna, sardines, and salmon.  · Eat foods that are high in fiber, such as whole grains, beans, apples, broccoli, carrots, peas, and barley.  General tips    · Work with your doctor to lose weight if you need to.  · Avoid:  ? Foods with added sugar.  ? Fried foods.  ? Foods with partially hydrogenated oils.  · Limit alcohol intake to no more than 1 drink a day for nonpregnant women and 2 drinks a day for men. One drink equals 12 oz of beer, 5 oz of wine, or 1½ oz of hard liquor.  Reading food labels  · Check food labels for:  ? Trans fats.  ? Partially hydrogenated oils.  ? Saturated fat (g) in each serving.  ? Cholesterol (mg) in each serving.  ? Fiber (g) in each serving.  · Choose foods with healthy fats, such as:  ? Monounsaturated fats.  ? Polyunsaturated fats.  ? Omega-3 fats.  · Choose grain products that have whole grains. Look for the word \"whole\" as the first word in the ingredient list.  Cooking  · Cook foods using low-fat methods. These include baking, boiling, grilling, and broiling.  · Eat more home-cooked foods. Eat at restaurants and buffets " less often.  · Avoid cooking using saturated fats, such as butter, cream, palm oil, palm kernel oil, and coconut oil.  Recommended foods    Fruits  · All fresh, canned (in natural juice), or frozen fruits.  Vegetables  · Fresh or frozen vegetables (raw, steamed, roasted, or grilled). Green salads.  Grains  · Whole grains, such as whole wheat or whole grain breads, crackers, cereals, and pasta. Unsweetened oatmeal, bulgur, barley, quinoa, or brown rice. Corn or whole wheat flour tortillas.  Meats and other protein foods  · Ground beef (85% or leaner), grass-fed beef, or beef trimmed of fat. Skinless chicken or turkey. Ground chicken or turkey. Pork trimmed of fat. All fish and seafood. Egg whites. Dried beans, peas, or lentils. Unsalted nuts or seeds. Unsalted canned beans. Nut butters without added sugar or oil.  Dairy  · Low-fat or nonfat dairy products, such as skim or 1% milk, 2% or reduced-fat cheeses, low-fat and fat-free ricotta or cottage cheese, or plain low-fat and nonfat yogurt.  Fats and oils  · Tub margarine without trans fats. Light or reduced-fat mayonnaise and salad dressings. Avocado. Olive, canola, sesame, or safflower oils.  The items listed above may not be a complete list of foods and beverages you can eat. Contact a dietitian for more information.  Foods to avoid  Fruits  · Canned fruit in heavy syrup. Fruit in cream or butter sauce. Fried fruit.  Vegetables  · Vegetables cooked in cheese, cream, or butter sauce. Fried vegetables.  Grains  · White bread. White pasta. White rice. Cornbread. Bagels, pastries, and croissants. Crackers and snack foods that contain trans fat and hydrogenated oils.  Meats and other protein foods  · Fatty cuts of meat. Ribs, chicken wings, smith, sausage, bologna, salami, chitterlings, fatback, hot dogs, bratwurst, and packaged lunch meats. Liver and organ meats. Whole eggs and egg yolks. Chicken and turkey with skin. Fried meat.  Dairy  · Whole or 2% milk, cream,  half-and-half, and cream cheese. Whole milk cheeses. Whole-fat or sweetened yogurt. Full-fat cheeses. Nondairy creamers and whipped toppings. Processed cheese, cheese spreads, and cheese curds.  Beverages  · Alcohol. Sugar-sweetened drinks such as sodas, lemonade, and fruit drinks.  Fats and oils  · Butter, stick margarine, lard, shortening, ghee, or smith fat. Coconut, palm kernel, and palm oils.  Sweets and desserts  · Corn syrup, sugars, honey, and molasses. Candy. Jam and jelly. Syrup. Sweetened cereals. Cookies, pies, cakes, donuts, muffins, and ice cream.  The items listed above may not be a complete list of foods and beverages you should avoid. Contact a dietitian for more information.  Summary  · Choosing the right foods helps keep your fat and cholesterol at normal levels. This can keep you from getting certain diseases.  · At meals, fill one-half of your plate with vegetables and green salads.  · Eat high-fiber foods, like whole grains, beans, apples, carrots, peas, and barley.  · Limit added sugar, saturated fats, alcohol, and fried foods.  This information is not intended to replace advice given to you by your health care provider. Make sure you discuss any questions you have with your health care provider.  Document Released: 06/18/2013 Document Revised: 08/21/2019 Document Reviewed: 09/04/2018  Elsevier Patient Education © 2020 [employer]  BMI for Adults    Body mass index (BMI) is a number that is calculated from a person's weight and height. BMI may help to estimate how much of a person's weight is composed of fat. BMI can help identify those who may be at higher risk for certain medical problems.  How is BMI used with adults?  BMI is used as a screening tool to identify possible weight problems. It is used to check whether a person is obese, overweight, healthy weight, or underweight.  How is BMI calculated?  BMI measures your weight and compares it to your height. This can be done either in  "English ([place] or metric measurements. Note that charts are available to help you find your BMI quickly and easily without having to do these calculations yourself.  To calculate your BMI in English (U.S[place] measurements, your health care provider will:  1. Measure your weight in pounds (lb).  2. Multiply the number of pounds by 703.  ? For example, for a person who weighs 180 lb, multiply that number by 703, which equals 126,540.  3. Measure your height in inches (in). Then multiply that number by itself to get a measurement called \"inches squared.\"  ? For example, for a person who is 70 in tall, the \"inches squared\" measurement is 70 in x 70 in, which equals 4900 inches squared.  4. Divide the total from Step 2 (number of lb x 703) by the total from Step 3 (inches squared): 126,540 ÷ 4900 = 25.8. This is your BMI.  To calculate your BMI in metric measurements, your health care provider will:  1. Measure your weight in kilograms (kg).  2. Measure your height in meters (m). Then multiply that number by itself to get a measurement called \"meters squared.\"  ? For example, for a person who is 1.75 m tall, the \"meters squared\" measurement is 1.75 m x 1.75 m, which is equal to 3.1 meters squared.  3. Divide the number of kilograms (your weight) by the meters squared number. In this example: 70 ÷ 3.1 = 22.6. This is your BMI.  How is BMI interpreted?  To interpret your results, your health care provider will use BMI charts to identify whether you are underweight, normal weight, overweight, or obese. The following guidelines will be used:  · Underweight: BMI less than 18.5.  · Normal weight: BMI between 18.5 and 24.9.  · Overweight: BMI between 25 and 29.9.  · Obese: BMI of 30 and above.  Please note:  · Weight includes both fat and muscle, so someone with a muscular build, such as an athlete, may have a BMI that is higher than 24.9. In cases like these, BMI is not an accurate measure of body fat.  · To determine if excess " body fat is the cause of a BMI of 25 or higher, further assessments may need to be done by a health care provider.  · BMI is usually interpreted in the same way for men and women.  Why is BMI a useful tool?  BMI is useful in two ways:  · Identifying a weight problem that may be related to a medical condition, or that may increase the risk for medical problems.  · Promoting lifestyle and diet changes in order to reach a healthy weight.  Summary  · Body mass index (BMI) is a number that is calculated from a person's weight and height.  · BMI may help to estimate how much of a person's weight is composed of fat. BMI can help identify those who may be at higher risk for certain medical problems.  · BMI can be measured using English measurements or metric measurements.  · To interpret your results, your health care provider will use BMI charts to identify whether you are underweight, normal weight, overweight, or obese.  This information is not intended to replace advice given to you by your health care provider. Make sure you discuss any questions you have with your health care provider.  Document Released: 08/29/2005 Document Revised: 11/30/2018 Document Reviewed: 10/31/2018  Elsevier Patient Education © 2020 Else[employer] Inc[employer]

## 2020-10-05 NOTE — PROGRESS NOTES
Problem list     Subjective   Lyndsay Franco is a 70 y.o. female     Chief Complaint   Patient presents with   • Follow-up     Here for a 2 month follow up     PROBLEM LIST:         1.  Coronary artery disease  1.1 history of multiple stenting procedures in 2004 and  2016, inadequate data  1.2 stress test April 2018 demonstrates no evidence of ischemia and preserved LV function  1.31.  Stress test Scintigraphy demonstrates ischemia involving the inferoseptal segment, the basilar inferolateral segment, and the entire inferior wall.  1.4 cardiac catheterization March 2019 demonstrated chronic total occlusion of the RCA.  Nonobstructive LAD with high-grade stenosis involving the circumflex and obtuse marginal.  Angioplasty and stenting of the LAD and circumflex and stenting of the obtuse marginal.  Medical management recommended in recanalization of the  if symptoms did not improve  1.5 Successful  PCI RCA laser atherectomy and KRISTINE x 1 via LRA per Dr. Kwong at [facility] on 8-14-19  2.  Peripheral vascular disease  2.1 history of iliac stenting bilaterally    2.2 follow-up femorofemoral bypass by Dr. Dupont at [facility] in [place]  2.2 follow-up CT angiogram May 2018 demonstrates occluded iliacs with femorofemoral bypass noted but no discussion on report in regards to patency or disease  2.3 intermittent claudication  2.4 questionable left renal artery stenosis  2.5 right renal artery stenting June 2018 by Dr. Mcalilster and Dr. Dupont with no significant disease of the lower extremities identified  3.  Labile hypertension  4.  Infrarenal abdominal aortic aneurysm measuring 3 cm by CT scan May 2018  4.1 infrarenal abdominal aortic aneurysm measured by duplex at 3.4 cm  5.  Type 2 diabetes mellitus  6.  Dyslipidemia  7.  Chronic tobacco use  8.  Carotid artery stenosis  8.1 carotid duplex March 2019 demonstrates 50% left internal carotid artery stenosis on the left and less than 50% on the right with  antegrade vertebral flow  8.2 carotid duplex 2020 June 2020 with a 50 to 75% left internal carotid artery stenosis.  8.3 left carotid enterectomy July 2020 by Dr. Mcallister  8.4 acute thrombus postoperatively involving the left carotid with right-sided hemineglect.  Patient underwent repeat surgery with thrombectomy and closure  HPI    Patient is a 70-year-old female that presents back to the office for follow-up.    She presents today with complaints of her blood pressure.  Patient has been on multiple medications to treat her labile hypertension.  Patient has history of vasculopathy.  She has diffuse vascular disease involving the coronaries carotids and lower extremities and patient has underwent multiple procedures to treat the same.    She has had difficulty in regards to blood pressure although she underwent right renal stenting in June 2018.  She has had some evaluation in regards to secondary causes of hypertension.    Patient feels that she is on multiple medications and that is causing her to feel bad and in fact she would like to change her medications.  She describes having a friend who is on lisinopril and she has not been on that medication and feels that that would help her.    She does not describe any chest pain or pressure and has mild stable levels of dyspnea.  She does not describe progressive shortness of breath.  No PND orthopnea.    She does not describe palpitations or dysrhythmic symptoms and otherwise is doing well      Current Outpatient Medications on File Prior to Visit   Medication Sig Dispense Refill   • aspirin 81 MG tablet Take 81 mg by mouth 2 (Two) Times a Day. 30 tablet 11   • atorvastatin (LIPITOR) 40 MG tablet TAKE 1 TABLET BY MOUTH DAILY. 30 tablet 11   • clopidogrel (PLAVIX) 75 MG tablet Take 75 mg by mouth Daily.     • glimepiride (AMARYL) 1 MG tablet Take 1 tablet by mouth Daily.     • HYDROcodone-acetaminophen (NORCO)  MG per tablet prn     • metFORMIN (GLUCOPHAGE) 850  MG tablet Take 850 mg by mouth 3 (Three) Times a Day.     • nitroglycerin (NITROSTAT) 0.4 MG SL tablet Take 1 tablet by mouth As Needed for Chest Pain. 25 tablet 1   • pantoprazole (PROTONIX) 40 MG EC tablet Take 40 mg by mouth Daily.     • senna (SENOKOT) 8.6 MG tablet tablet Take  by mouth Daily As Needed.     • CHANTIX CONTINUING MONTH KARIE 1 MG tablet AS PRESCRIBED (Patient taking differently: Take 1 mg by mouth 2 (Two) Times a Day. As prescribed) 56 tablet 2   • furosemide (LASIX) 20 MG tablet Take 20 mg by mouth As Needed.     • hydrOXYzine (ATARAX) 10 MG tablet Take 10 mg by mouth 3 (Three) Times a Day As Needed for Itching.     • isosorbide mononitrate (IMDUR) 60 MG 24 hr tablet Take 1 tablet by mouth Daily. 30 tablet 5   • promethazine (PHENERGAN) 12.5 MG tablet As Needed.     • traZODone (DESYREL) 50 MG tablet Take 50 mg by mouth Every Night.       No current facility-administered medications on file prior to visit.        Patient has no known allergies.    Past Medical History:   Diagnosis Date   • Anxiety    • Arthritis    • Coronary artery disease     stents aug 10, 2020  maker   • Depression    • Diabetes mellitus (CMS/HCC)     type dm, diagnosed approx age 47, check blood sugar bid   • Frequent headaches    • GERD (gastroesophageal reflux disease)    • Hiatal hernia    • History of anemia    • Hyperlipidemia    • Hypertension    • Myocardial infarction (CMS/HCC)     patient unsure of date   • Non-functioning kidney     Left    • Peripheral vascular disease (CMS/HCC)    • Renal artery aneurysm (CMS/HCC)    • Stenosis of carotid artery 3/11/2020   • Systolic ejection murmur 3/27/2018   • Wears dentures     top, partial bottom   • Wears reading eyeglasses        Social History     Socioeconomic History   • Marital status:      Spouse name: Not on file   • Number of children: 3   • Years of education: Not on file   • Highest education level: Not on file   Occupational History   • Occupation:  "[employer]     Comment: Disabled-PVD   Tobacco Use   • Smoking status: Former Smoker     Packs/day: 0.75     Years: 40.00     Pack years: 30.00     Types: Cigarettes, Electronic Cigarette   • Smokeless tobacco: Never Used   • Tobacco comment: Quit Aug 2019   Substance and Sexual Activity   • Alcohol use: No   • Drug use: No   • Sexual activity: Defer   Social History Narrative    Lives in [place] alone.        Family History   Adopted: Yes   Problem Relation Age of Onset   • No Known Problems Mother    • No Known Problems Father        Review of Systems   Constitutional: Positive for fatigue. Negative for chills and fever.   HENT: Negative.    Eyes: Positive for visual disturbance (reading glasses ).   Respiratory: Positive for shortness of breath. Negative for chest tightness.    Cardiovascular: Negative for chest pain, palpitations and leg swelling.   Gastrointestinal: Negative.    Endocrine: Negative.  Negative for cold intolerance and heat intolerance.   Genitourinary: Negative.    Musculoskeletal: Positive for arthralgias (joints ) and back pain (Low back pain ).   Skin: Negative.  Negative for rash and wound.   Allergic/Immunologic: Positive for environmental allergies (seasonal ). Negative for food allergies.   Neurological: Positive for light-headedness (when standing too fast ). Negative for dizziness and weakness.   Hematological: Bruises/bleeds easily.   Psychiatric/Behavioral: Negative.  Negative for sleep disturbance (denies waking with smothering ).   All other systems reviewed and are negative.      Objective   Vitals:    10/05/20 1520   BP: 180/82   BP Location: Left arm   Patient Position: Sitting   Pulse: 87   Temp: 96.9 °F (36.1 °C)   SpO2: 96%   Weight: 83.9 kg (185 lb)   Height: 162.6 cm (64\")      /82 (BP Location: Left arm, Patient Position: Sitting)   Pulse 87   Temp 96.9 °F (36.1 °C)   Ht 162.6 cm (64\")   Wt 83.9 kg (185 lb)   SpO2 96%   BMI 31.76 kg/m²     "     Lab Results (most recent)     None          Physical Exam  Vitals signs and nursing note reviewed.   Constitutional:       General: She is not in acute distress.     Appearance: Normal appearance. She is well-developed.   HENT:      Head: Normocephalic and atraumatic.   Eyes:      General: No scleral icterus.        Right eye: No discharge.         Left eye: No discharge.      Conjunctiva/sclera: Conjunctivae normal.   Neck:      Vascular: Carotid bruit present.   Cardiovascular:      Rate and Rhythm: Normal rate and regular rhythm.      Heart sounds: Normal heart sounds. No murmur. No friction rub. No gallop.    Pulmonary:      Effort: Pulmonary effort is normal. No respiratory distress.      Breath sounds: Normal breath sounds. No wheezing or rales.   Chest:      Chest wall: No tenderness.   Musculoskeletal:      Right lower leg: No edema.      Left lower leg: No edema.   Skin:     General: Skin is warm and dry.      Coloration: Skin is not pale.      Findings: No erythema or rash.   Neurological:      Mental Status: She is alert and oriented to person, place, and time.      Cranial Nerves: No cranial nerve deficit.   Psychiatric:         Behavior: Behavior normal.         Procedure   Procedures       Assessment/Plan     Problems Addressed this Visit        Cardiovascular and Mediastinum    Coronary artery disease involving native coronary artery of native heart without angina pectoris - Primary    Essential hypertension    Relevant Medications    lisinopril (PRINIVIL,ZESTRIL) 40 MG tablet    cloNIDine (Catapres) 0.2 MG tablet       Other    PAD (peripheral artery disease) (CMS/[facility])    Dyslipidemia      Diagnoses       Codes Comments    Coronary artery disease involving native coronary artery of native heart without angina pectoris    -  Primary ICD-10-CM: I25.10  ICD-9-CM: 414.01     PAD (peripheral artery disease) (CMS/[facility])     ICD-10-CM: I73.9  ICD-9-CM: 443.9     Essential hypertension     ICD-10-CM:  I10  ICD-9-CM: 401.9     Dyslipidemia     ICD-10-CM: E78.5  ICD-9-CM: 272.4             Recommendation  1.  Patient is a 70-year-old female with history of vasculopathy and coronary disease without complaints of angina at this point with stable levels of dyspnea.  For now we will monitor the symptoms and continue antiplatelet and statin therapy    2.  In fact, LDL is well controlled as well as total cholesterol well per review of recent lipid parameters and will continue    3.  We have discussed patient's hypertension.  Despite the fact that her friend is taking lisinopril, I discussed with her that she has poorly controlled hypertension and labile hypertension which is likely very different than what her friend has.  She would like her medications changed.  She feels that this is causing issues and she would like a change in medicine.  I am placing her on lisinopril 40 mg and placing her on clonidine twice a day.  She has issues in the past with lower extremity edema and adverse effects of other medications.  We will try these to but she will likely need other medications because of her multidrug regimen just to keep her blood pressure controlled.  I want her to call us back in 1 to 2-week with blood pressure readings and we will make adjustments at that point    4.  Patient doing well otherwise.  Patient with peripheral disease but she does not complain of any complaints of claudication.  Will consider repeat carotid assessment next year.  Otherwise she is on appropriate medical therapy will see her back for follow-up in a few months.  She is to follow with primary as scheduled         Lyndsay Franco  reports that she has quit smoking. Her smoking use included cigarettes and electronic cigarette. She has a 30.00 pack-year smoking history. She has never used smokeless tobacco..         Patient's Body mass index is 31.76 kg/m². BMI is above normal parameters. Recommendations include: educational material and  referral to primary care.       Electronically signed by:

## 2020-10-06 PROBLEM — E78.5 DYSLIPIDEMIA: Status: ACTIVE | Noted: 2020-10-06

## 2020-10-19 NOTE — TELEPHONE ENCOUNTER
Pt left a Vm on the triage line reporting her medication is working well however, she is getting lightheaded when she stands up.  She states she is taking her Lisinopril 1/2 pill daily and Clonidine BID.  B/P readings:  Last week-111/76, 149/83, and it is 143/94 10/19 @ 1235.

## 2020-10-19 NOTE — TELEPHONE ENCOUNTER
I believe I sent her in clonidine 0.2 mg to take twice a day.  Can be decreased to 0.1 mg twice a day.  In fact, if she can, she can split the 0.2 mg in half and take a half a pill twice a day if needed

## 2021-01-01 ENCOUNTER — TELEPHONE (OUTPATIENT)
Dept: CARDIAC SURGERY | Facility: CLINIC | Age: 71
End: 2021-01-01

## 2021-01-01 ENCOUNTER — OFFICE VISIT (OUTPATIENT)
Dept: CARDIOLOGY | Facility: CLINIC | Age: 71
End: 2021-01-01

## 2021-01-01 ENCOUNTER — TELEPHONE (OUTPATIENT)
Dept: CARDIOLOGY | Facility: CLINIC | Age: 71
End: 2021-01-01

## 2021-01-01 VITALS
HEIGHT: 64 IN | WEIGHT: 172.4 LBS | OXYGEN SATURATION: 95 % | HEART RATE: 107 BPM | DIASTOLIC BLOOD PRESSURE: 75 MMHG | SYSTOLIC BLOOD PRESSURE: 108 MMHG | BODY MASS INDEX: 29.43 KG/M2

## 2021-01-01 VITALS
HEIGHT: 64 IN | SYSTOLIC BLOOD PRESSURE: 153 MMHG | DIASTOLIC BLOOD PRESSURE: 66 MMHG | HEART RATE: 76 BPM | WEIGHT: 180 LBS | OXYGEN SATURATION: 96 % | BODY MASS INDEX: 30.73 KG/M2

## 2021-01-01 VITALS
HEIGHT: 64 IN | HEART RATE: 86 BPM | DIASTOLIC BLOOD PRESSURE: 67 MMHG | BODY MASS INDEX: 32.54 KG/M2 | SYSTOLIC BLOOD PRESSURE: 165 MMHG | WEIGHT: 190.6 LBS

## 2021-01-01 DIAGNOSIS — I10 ESSENTIAL HYPERTENSION: ICD-10-CM

## 2021-01-01 DIAGNOSIS — R06.02 SHORTNESS OF BREATH: ICD-10-CM

## 2021-01-01 DIAGNOSIS — I25.10 CORONARY ARTERY DISEASE INVOLVING NATIVE CORONARY ARTERY OF NATIVE HEART WITHOUT ANGINA PECTORIS: Primary | ICD-10-CM

## 2021-01-01 DIAGNOSIS — I25.5 ISCHEMIC CARDIOMYOPATHY: ICD-10-CM

## 2021-01-01 DIAGNOSIS — I65.29 STENOSIS OF CAROTID ARTERY, UNSPECIFIED LATERALITY: ICD-10-CM

## 2021-01-01 DIAGNOSIS — I73.9 PVD (PERIPHERAL VASCULAR DISEASE) WITH CLAUDICATION (HCC): ICD-10-CM

## 2021-01-01 DIAGNOSIS — I73.9 PAD (PERIPHERAL ARTERY DISEASE) (HCC): ICD-10-CM

## 2021-01-01 DIAGNOSIS — I71.40 AAA (ABDOMINAL AORTIC ANEURYSM) WITHOUT RUPTURE (HCC): ICD-10-CM

## 2021-01-01 DIAGNOSIS — I10 ESSENTIAL HYPERTENSION: Primary | ICD-10-CM

## 2021-01-01 PROCEDURE — 99214 OFFICE O/P EST MOD 30 MIN: CPT | Performed by: PHYSICIAN ASSISTANT

## 2021-01-01 RX ORDER — ORAL SEMAGLUTIDE 7 MG/1
TABLET ORAL
COMMUNITY

## 2021-01-01 RX ORDER — ALBUTEROL SULFATE 90 UG/1
2 AEROSOL, METERED RESPIRATORY (INHALATION) EVERY 4 HOURS PRN
COMMUNITY

## 2021-01-01 RX ORDER — LORATADINE 10 MG/1
10 TABLET ORAL DAILY
COMMUNITY

## 2021-01-01 RX ORDER — FAMOTIDINE 20 MG/1
20 TABLET, FILM COATED ORAL 2 TIMES DAILY
COMMUNITY

## 2021-01-01 RX ORDER — CEPHALEXIN 500 MG/1
500 CAPSULE ORAL 2 TIMES DAILY
COMMUNITY

## 2021-01-01 RX ORDER — LISINOPRIL 40 MG/1
40 TABLET ORAL 2 TIMES DAILY
Qty: 60 TABLET | Refills: 5 | Status: SHIPPED | OUTPATIENT
Start: 2021-01-01

## 2021-01-01 RX ORDER — HYDROCHLOROTHIAZIDE 25 MG/1
25 TABLET ORAL DAILY
COMMUNITY

## 2021-01-01 RX ORDER — ONDANSETRON 4 MG/1
4 TABLET, FILM COATED ORAL EVERY 8 HOURS PRN
COMMUNITY

## 2021-01-01 RX ORDER — NIFEDIPINE 60 MG/1
60 TABLET, EXTENDED RELEASE ORAL DAILY
COMMUNITY

## 2021-01-01 RX ORDER — CLONIDINE HYDROCHLORIDE 0.3 MG/1
0.3 TABLET ORAL 2 TIMES DAILY
COMMUNITY

## 2021-01-01 RX ORDER — LOSARTAN POTASSIUM 100 MG/1
100 TABLET ORAL DAILY
COMMUNITY

## 2021-01-01 RX ORDER — DOCUSATE SODIUM 100 MG/1
100 CAPSULE, LIQUID FILLED ORAL 2 TIMES DAILY
COMMUNITY

## 2021-01-01 RX ORDER — INSULIN GLARGINE 100 [IU]/ML
INJECTION, SOLUTION SUBCUTANEOUS DAILY
COMMUNITY

## 2021-01-01 RX ORDER — LISINOPRIL 40 MG/1
40 TABLET ORAL 2 TIMES DAILY
Qty: 30 TABLET | Refills: 0 | Status: SHIPPED | OUTPATIENT
Start: 2021-01-01 | End: 2021-01-01 | Stop reason: SDUPTHER

## 2021-01-01 RX ORDER — SERTRALINE HYDROCHLORIDE 25 MG/1
25 TABLET, FILM COATED ORAL DAILY
COMMUNITY

## 2021-01-11 NOTE — TELEPHONE ENCOUNTER
"Pharm contacts office stating pt requested refill for Lisinopril.  Reports pharm has pt taking Lisinopril 40 mg daily.  Pt states she is taking 40 mg Bid.  Pharm requesting confirmation for Lisinopril dosage.    Contacted pt.  States she is currently taking Lisinopril 40 mg BID and Clonidine 0.2 mg daily.  Verbalizes, \"If I feel like my blood pressure is up, I will take another Clonidine in the evening.\"  Reports she does not monitor her BP regularly.  She does not keep a BP log.  Verbalizes \"It just depends on how I feel.\"  I did ask pt to check her BP now readin/89 w/ .  Reports episode of cp that radiates to L arm on Temo 01/10/21.  She did take a Nitro, at which time, cp subsided.  Also reports palpitations.  No dizziness or SOB.  No c/o edema.  No weakness or fatigue.  No numbness or tingling.  No other symptoms reported at this time.      D/w MELISSA Argueta.  Ok to take Lisinopril BID.  Pt to keep log of BP x 1 wk and contact our office w/ readings.  Pt verbalizes understanding and is agreeable.    "

## 2021-01-13 NOTE — PATIENT INSTRUCTIONS
How to Quarantine at Home  Information for Patients and Families    These instructions are for people with confirmed or suspected COVID-19 who do not need to be hospitalized and those with confirmed COVID-19 who were hospitalized and discharged to care for themselves at home.    If you were tested through the Health Department  The Health Department will monitor your wellbeing.  If it is determined that you do not need to be hospitalized and can be isolated at home, you will be monitored by staff from your local or state health department.     If you were tested through a Commercial Lab  You will need to monitor yourself and report changes in your symptoms to your doctor.  See the section below called Monitor Your Symptoms.    Follow these steps until a healthcare provider or local or state health department says you can return to your normal activities.    Stay home except to get medical care  • Restrict activities outside your home, except for getting medical care.   • Do not go to work, school, or public areas.   • Avoid using public transportation, ride-sharing, or taxis.    Separate yourself from other people and animals in your home  People  As much as possible, you should stay in a specific room and away from other people in your home. Also, you should use a separate bathroom, if available.    Animals  You should restrict contact with pets and other animals while you are sick with COVID-19, just like you would around other people. When possible, have another member of your household care for your animals while you are sick. If you are sick with COVID-19, avoid contact with your pet, including petting, snuggling, being kissed or licked, and sharing food. If you must care for your pet or be around animals while you are sick, wash your hands before and after you interact with pets and wear a facemask. See COVID-19 and Animals for more information.    Call ahead before visiting your doctor  If you have a medical  appointment, call the healthcare provider and tell them that you have or may have COVID-19. This information will help the healthcare provider’s office take steps to keep other people from getting infected or exposed.    Wear a facemask  You should wear a facemask when you are around other people (e.g., sharing a room or vehicle) or pets and before you enter a healthcare provider’s office.     If you are not able to wear a facemask (for example, because it causes trouble breathing), then people who live with you should not stay in the same room with you, or they should wear a facemask if they enter your room.    Cover your coughs and sneezes  • Cover your mouth and nose with a tissue when you cough or sneeze.   • Throw used tissues in a lined trash can.   • Immediately wash your hands with soap and water for at least 20 seconds or, if soap and water are not available, clean your hands with an alcohol-based hand  that contains at least 60% alcohol.    Clean your hands often  • Wash your hands often with soap and water for at least 20 seconds, especially after blowing your nose, coughing, or sneezing; going to the bathroom; and before eating or preparing food.     • If soap and water are not readily available, use an alcohol-based hand  with at least 60% alcohol, covering all surfaces of your hands and rubbing them together until they feel dry.    • Soap and water are the best option if hands are visibly dirty. Avoid touching your eyes, nose, and mouth with unwashed hands.    Avoid sharing personal household items  • You should not share dishes, drinking glasses, cups, eating utensils, towels, or bedding with other people or pets in your home.   • After using these items, they should be washed thoroughly with soap and water.    Clean all “high-touch” surfaces everyday  • High touch surfaces include counters, tabletops, doorknobs, bathroom fixtures, toilets, phones, keyboards, tablets, and bedside  tables.   • Also, clean any surfaces that may have blood, stool, or body fluids on them.   • Use a household cleaning spray or wipe, according to the label instructions. Labels contain instructions for safe and effective use of the cleaning product, including precautions you should take when applying the product, such as wearing gloves and making sure you have good ventilation during use of the product.    Monitor your symptoms  • Seek prompt medical attention if your illness is worsening (e.g., difficulty breathing).   • Before seeking care, call your healthcare provider and tell them that you have, or are being evaluated for, COVID-19.   • Put on a facemask before you enter the facility.     • These steps will help the healthcare provider’s office to keep other people in the office or waiting room from getting infected or exposed.   • Persons who are placed under active monitoring or facilitated self-monitoring should follow instructions provided by their local health department or occupational health professionals, as appropriate.  • If you have a medical emergency and need to call 911, notify the dispatch personnel that you have, or are being evaluated for COVID-19. If possible, put on a facemask before emergency medical services arrive.    Discontinuing home isolation  Patients with confirmed COVID-19 should remain under home isolation precautions until the risk of secondary transmission to others is thought to be low. The decision to discontinue home isolation precautions should be made on a case-by-case basis, in consultation with healthcare providers and state and local health departments.    The below content are for household members, intimate partners, and caregivers of a patient with symptomatic laboratory-confirmed COVID-19 or a patient under investigation:    Household members, intimate partners, and caregivers may have close contact with a person with symptomatic, laboratory-confirmed COVID-19 or a  person under investigation.     Close contacts should monitor their health; they should call their healthcare provider right away if they develop symptoms suggestive of COVID-19 (e.g., fever, cough, shortness of breath)     Close contacts should also follow these recommendations:  • Make sure that you understand and can help the patient follow their healthcare provider’s instructions for medication(s) and care. You should help the patient with basic needs in the home and provide support for getting groceries, prescriptions, and other personal needs.  • Monitor the patient’s symptoms. If the patient is getting sicker, call his or her healthcare provider and tell them that the patient has laboratory-confirmed COVID-19. This will help the healthcare provider’s office take steps to keep other people in the office or waiting room from getting infected. Ask the healthcare provider to call the local or [facility] health department for additional guidance. If the patient has a medical emergency and you need to call 911, notify the dispatch personnel that the patient has, or is being evaluated for COVID-19.  • Household members should stay in another room or be  from the patient as much as possible. Household members should use a separate bedroom and bathroom, if available.  • Prohibit visitors who do not have an essential need to be in the home.  • Household members should care for any pets in the home. Do not handle pets or other animals while sick.  For more information, see COVID-19 and Animals.  • Make sure that shared spaces in the home have good air flow, such as by an air conditioner or an opened window, weather permitting.  • Perform hand hygiene frequently. Wash your hands often with soap and water for at least 20 seconds or use an alcohol-based hand  that contains 60 to 95% alcohol, covering all surfaces of your hands and rubbing them together until they feel dry. Soap and water should be used  preferentially if hands are visibly dirty.  • Avoid touching your eyes, nose, and mouth with unwashed hands.  • The patient should wear a facemask when you are around other people. If the patient is not able to wear a facemask (for example, because it causes trouble breathing), you, as the caregiver, should wear a mask when you are in the same room as the patient.  • Wear a disposable facemask and gloves when you touch or have contact with the patient’s blood, stool, or body fluids, such as saliva, sputum, nasal mucus, vomit, or urine.   o Throw out disposable facemasks and gloves after using them. Do not reuse.  o When removing personal protective equipment, first remove and dispose of gloves. Then, immediately clean your hands with soap and water or alcohol-based hand . Next, remove and dispose of facemask, and immediately clean your hands again with soap and water or alcohol-based hand .  • Avoid sharing household items with the patient. You should not share dishes, drinking glasses, cups, eating utensils, towels, bedding, or other items. After the patient uses these items, you should wash them thoroughly (see below “Wash laundry thoroughly”).  • Clean all “high-touch” surfaces, such as counters, tabletops, doorknobs, bathroom fixtures, toilets, phones, keyboards, tablets, and bedside tables, every day. Also, clean any surfaces that may have blood, stool, or body fluids on them.   o Use a household cleaning spray or wipe, according to the label instructions. Labels contain instructions for safe and effective use of the cleaning product including precautions you should take when applying the product, such as wearing gloves and making sure you have good ventilation during use of the product.  • Wash laundry thoroughly.   o Immediately remove and wash clothes or bedding that have blood, stool, or body fluids on them.  o Wear disposable gloves while handling soiled items and keep soiled items away  from your body. Clean your hands (with soap and water or an alcohol-based hand ) immediately after removing your gloves.  o Read and follow directions on labels of laundry or clothing items and detergent. In general, using a normal laundry detergent according to washing machine instructions and dry thoroughly using the warmest temperatures recommended on the clothing label.  • Place all used disposable gloves, facemasks, and other contaminated items in a lined container before disposing of them with other household waste. Clean your hands (with soap and water or an alcohol-based hand ) immediately after handling these items. Soap and water should be used preferentially if hands are visibly dirty.  • Discuss any additional questions with your state or local health department or healthcare provider.    Adapted from information provided by the Centers for Disease Control and Prevention.  For more information, visit https://www.cdc.gov/coronavirus/2019-ncov/hcp/guidance-prevent-spread.htmlHeart-Healthy Eating Plan  Heart-healthy meal planning includes:  · Eating less unhealthy fats.  · Eating more healthy fats.  · Making other changes in your diet.  Talk with your doctor or a diet specialist (dietitian) to create an eating plan that is right for you.  What is my plan?  Your doctor may recommend an eating plan that includes:  · Total fat: ______% or less of total calories a day.  · Saturated fat: ______% or less of total calories a day.  · Cholesterol: less than _________mg a day.  What are tips for following this plan?  Cooking  Avoid frying your food. Try to bake, boil, grill, or broil it instead. You can also reduce fat by:  · Removing the skin from poultry.  · Removing all visible fats from meats.  · Steaming vegetables in water or broth.  Meal planning    · At meals, divide your plate into four equal parts:  ? Fill one-half of your plate with vegetables and green salads.  ? Fill one-fourth of your  plate with whole grains.  ? Fill one-fourth of your plate with lean protein foods.  · Eat 4-5 servings of vegetables per day. A serving of vegetables is:  ? 1 cup of raw or cooked vegetables.  ? 2 cups of raw leafy greens.  · Eat 4-5 servings of fruit per day. A serving of fruit is:  ? 1 medium whole fruit.  ? ¼ cup of dried fruit.  ? ½ cup of fresh, frozen, or canned fruit.  ? ½ cup of 100% fruit juice.  · Eat more foods that have soluble fiber. These are apples, broccoli, carrots, beans, peas, and barley. Try to get 20-30 g of fiber per day.  · Eat 4-5 servings of nuts, legumes, and seeds per week:  ? 1 serving of dried beans or legumes equals ½ cup after being cooked.  ? 1 serving of nuts is ¼ cup.  ? 1 serving of seeds equals 1 tablespoon.  General information  · Eat more home-cooked food. Eat less restaurant, buffet, and fast food.  · Limit or avoid alcohol.  · Limit foods that are high in starch and sugar.  · Avoid fried foods.  · Lose weight if you are overweight.  · Keep track of how much salt (sodium) you eat. This is important if you have high blood pressure. Ask your doctor to tell you more about this.  · Try to add vegetarian meals each week.  Fats  · Choose healthy fats. These include olive oil and canola oil, flaxseeds, walnuts, almonds, and seeds.  · Eat more omega-3 fats. These include salmon, mackerel, sardines, tuna, flaxseed oil, and ground flaxseeds. Try to eat fish at least 2 times each week.  · Check food labels. Avoid foods with trans fats or high amounts of saturated fat.  · Limit saturated fats.  ? These are often found in animal products, such as meats, butter, and cream.  ? These are also found in plant foods, such as palm oil, palm kernel oil, and coconut oil.  · Avoid foods with partially hydrogenated oils in them. These have trans fats. Examples are stick margarine, some tub margarines, cookies, crackers, and other baked goods.  What foods can I eat?  Fruits  All fresh, canned (in  natural juice), or frozen fruits.  Vegetables  Fresh or frozen vegetables (raw, steamed, roasted, or grilled). Green salads.  Grains  Most grains. Choose whole wheat and whole grains most of the time. Rice and pasta, including brown rice and pastas made with whole wheat.  Meats and other proteins  Lean, well-trimmed beef, veal, pork, and lamb. Chicken and turkey without skin. All fish and shellfish. Wild duck, rabbit, pheasant, and venison. Egg whites or low-cholesterol egg substitutes. Dried beans, peas, lentils, and tofu. Seeds and most nuts.  Dairy  Low-fat or nonfat cheeses, including ricotta and mozzarella. Skim or 1% milk that is liquid, powdered, or evaporated. Buttermilk that is made with low-fat milk. Nonfat or low-fat yogurt.  Fats and oils  Non-hydrogenated (trans-free) margarines. Vegetable oils, including soybean, sesame, sunflower, olive, peanut, safflower, corn, canola, and cottonseed. Salad dressings or mayonnaise made with a vegetable oil.  Beverages  Mineral water. Coffee and tea. Diet carbonated beverages.  Sweets and desserts  Sherbet, gelatin, and fruit ice. Small amounts of dark chocolate.  Limit all sweets and desserts.  Seasonings and condiments  All seasonings and condiments.  The items listed above may not be a complete list of foods and drinks you can eat. Contact a dietitian for more options.  What foods should I avoid?  Fruits  Canned fruit in heavy syrup. Fruit in cream or butter sauce. Fried fruit. Limit coconut.  Vegetables  Vegetables cooked in cheese, cream, or butter sauce. Fried vegetables.  Grains  Breads that are made with saturated or trans fats, oils, or whole milk. Croissants. Sweet rolls. Donuts. High-fat crackers, such as cheese crackers.  Meats and other proteins  Fatty meats, such as hot dogs, ribs, sausage, smith, rib-eye roast or steak. High-fat deli meats, such as salami and bologna. Caviar. Domestic duck and goose. Organ meats, such as liver.  Dairy  Cream, sour  cream, cream cheese, and creamed cottage cheese. Whole-milk cheeses. Whole or 2% milk that is liquid, evaporated, or condensed. Whole buttermilk. Cream sauce or high-fat cheese sauce. Yogurt that is made from whole milk.  Fats and oils  Meat fat, or shortening. Cocoa butter, hydrogenated oils, palm oil, coconut oil, palm kernel oil. Solid fats and shortenings, including smith fat, salt pork, lard, and butter. Nondairy cream substitutes. Salad dressings with cheese or sour cream.  Beverages  Regular sodas and juice drinks with added sugar.  Sweets and desserts  Frosting. Pudding. Cookies. Cakes. Pies. Milk chocolate or white chocolate. Buttered syrups. Full-fat ice cream or ice cream drinks.  The items listed above may not be a complete list of foods and drinks to avoid. Contact a dietitian for more information.  Summary  · Heart-healthy meal planning includes eating less unhealthy fats, eating more healthy fats, and making other changes in your diet.  · Eat a balanced diet. This includes fruits and vegetables, low-fat or nonfat dairy, lean protein, nuts and legumes, whole grains, and heart-healthy oils and fats.  This information is not intended to replace advice given to you by your health care provider. Make sure you discuss any questions you have with your health care provider.  Document Revised: 02/21/2019 Document Reviewed: 01/25/2019  [employer] Patient Education © 2020 [employer]

## 2021-01-13 NOTE — PROGRESS NOTES
Problem list     Subjective   Lyndsay Franco is a 70 y.o. female     Chief Complaint   Patient presents with   • Follow-up   • Coronary Artery Disease   PROBLEM LIST:         1.  Coronary artery disease  1.1 history of multiple coronary interventions including, but not limited to, multiple multivessel stenting procedures and most recently recanalization of a chronic total occlusion of the RCA at [facility] in 2019 with laser atherectomy  2.  Peripheral vascular disease  2.1 history of iliac stenting bilaterally    2.2 follow-up femorofemoral bypass by Dr. Dupont at [facility] in [place]  2.3 follow-up CT angiogram May 2018 demonstrates occluded iliacs with femorofemoral bypass noted but no discussion on report in regards to patency or disease  2.4 right renal artery stenting June 2018 by Dr. Mcallister and Dr. Dupont with no significant disease of the lower extremities identified  3.  Labile hypertension  4.  Infrarenal abdominal aortic aneurysm measuring 3 cm by CT scan May 2018  4.1 infrarenal abdominal aortic aneurysm measured by duplex at 3.4 cm  5.  Type 2 diabetes mellitus  6.  Dyslipidemia  7.  Chronic tobacco use  8.  Carotid artery stenosis  8.1 left carotid enterectomy July 2020 by Dr. Mcallister  8.2 acute thrombus postoperatively involving the left carotid with right-sided hemineglect.  Patient underwent repeat surgery with thrombectomy and closure    HPI    Patient is a 70-year-old female that presents back to the office for follow-up.  She has history of significant vasculopathy involving the lower extremities carotids and coronaries.    She follows routinely through our office.  She does not describe any recent chest discomfort.  No chest pain or pressure.  She has chronic levels of dyspnea but has quit using tobacco which we applied.  She does not describe PND orthopnea.    She occasionally may sense palpitations as a fluttering sensation but this is quite rare.  She does not describe  any symptoms of strokes but she does not describe any dizziness presyncope or syncope.  We have had difficulty managing her blood pressure being on multiple drug regimens.  She has had issues with medications and currently is taking lisinopril twice a day and clonidine twice a day and that is keeping it manageable per her report      Current Outpatient Medications on File Prior to Visit   Medication Sig Dispense Refill   • aspirin 81 MG tablet Take 81 mg by mouth 2 (Two) Times a Day. 30 tablet 11   • atorvastatin (LIPITOR) 40 MG tablet TAKE 1 TABLET BY MOUTH DAILY. 30 tablet 11   • CHANTIX CONTINUING MONTH KARIE 1 MG tablet AS PRESCRIBED (Patient taking differently: Take 1 mg by mouth 2 (Two) Times a Day. As prescribed) 56 tablet 2   • cloNIDine (Catapres) 0.2 MG tablet Take 1/2 tab BID 30 tablet 5   • clopidogrel (PLAVIX) 75 MG tablet Take 75 mg by mouth Daily.     • furosemide (LASIX) 20 MG tablet Take 20 mg by mouth As Needed.     • glimepiride (AMARYL) 1 MG tablet Take 1 tablet by mouth Daily.     • HYDROcodone-acetaminophen (NORCO)  MG per tablet prn     • hydrOXYzine (ATARAX) 10 MG tablet Take 10 mg by mouth 3 (Three) Times a Day As Needed for Itching.     • isosorbide mononitrate (IMDUR) 60 MG 24 hr tablet Take 1 tablet by mouth Daily. 30 tablet 5   • metFORMIN (GLUCOPHAGE) 850 MG tablet Take 850 mg by mouth 3 (Three) Times a Day.     • nitroglycerin (NITROSTAT) 0.4 MG SL tablet Take 1 tablet by mouth As Needed for Chest Pain. 25 tablet 1   • pantoprazole (PROTONIX) 40 MG EC tablet Take 40 mg by mouth Daily.     • promethazine (PHENERGAN) 12.5 MG tablet As Needed.     • senna (SENOKOT) 8.6 MG tablet tablet Take  by mouth Daily As Needed.     • traZODone (DESYREL) 50 MG tablet Take 50 mg by mouth Every Night.     • [DISCONTINUED] lisinopril (PRINIVIL,ZESTRIL) 40 MG tablet Take 1 tablet by mouth 2 (two) times a day. 30 tablet 0     No current facility-administered medications on file prior to visit.               Patient has no known allergies.    Past Medical History:   Diagnosis Date   • Anxiety    • Arthritis    • Coronary artery disease     stents aug 10, 2020  maker   • Depression    • Diabetes mellitus (CMS/[facility])     type dm, diagnosed approx age 47, check blood sugar bid   • Frequent headaches    • GERD (gastroesophageal reflux disease)    • Hiatal hernia    • History of anemia    • Hyperlipidemia    • Hypertension    • Myocardial infarction (CMS/HCC)     patient unsure of date   • Non-functioning kidney     Left    • Peripheral vascular disease (CMS/HCC)    • Renal artery aneurysm (CMS/[facility])    • Stenosis of carotid artery 3/11/2020   • Systolic ejection murmur 3/27/2018   • Wears dentures     top, partial bottom   • Wears reading eyeglasses        Social History     Socioeconomic History   • Marital status:      Spouse name: Not on file   • Number of children: 3   • Years of education: Not on file   • Highest education level: Not on file   Occupational History   • Occupation: [employer]     Comment: Disabled-PVD   Tobacco Use   • Smoking status: Former Smoker     Packs/day: 0.75     Years: 40.00     Pack years: 30.00     Types: Cigarettes, Electronic Cigarette   • Smokeless tobacco: Never Used   • Tobacco comment: Quit Aug 2019   Substance and Sexual Activity   • Alcohol use: No   • Drug use: No   • Sexual activity: Defer   Social History Narrative    Lives in [facility].        Family History   Adopted: Yes   Problem Relation Age of Onset   • No Known Problems Mother    • No Known Problems Father        Review of Systems   Constitutional: Negative for chills, fatigue and fever.        Patient did not bring meds/list. Stated no changes.    HENT: Negative.  Negative for congestion, rhinorrhea and sore throat.    Eyes: Positive for visual disturbance (glasses).   Respiratory: Positive for shortness of breath (w/ elevated BP). Negative for apnea and chest tightness.   "  Cardiovascular: Positive for palpitations. Negative for chest pain and leg swelling.   Musculoskeletal: Positive for arthralgias, back pain and neck pain. Negative for gait problem and neck stiffness.   Skin: Negative.  Negative for rash and wound.   Allergic/Immunologic: Positive for environmental allergies (seasonal allergies).   Neurological: Positive for weakness. Negative for dizziness, light-headedness, numbness and headaches.   Hematological: Bruises/bleeds easily (bruise/bleed).   Psychiatric/Behavioral: Positive for sleep disturbance (difficulty falling/staying asleep. denies SoA at HS).       Objective   Vitals:    01/13/21 1010   BP: 165/67   BP Location: Left arm   Patient Position: Sitting   Pulse: 86   Weight: 86.5 kg (190 lb 9.6 oz)   Height: 162.6 cm (64.02\")      /67 (BP Location: Left arm, Patient Position: Sitting)   Pulse 86   Ht 162.6 cm (64.02\")   Wt 86.5 kg (190 lb 9.6 oz)   BMI 32.70 kg/m²     Lab Results (most recent)     None          Physical Exam  Vitals signs and nursing note reviewed.   Constitutional:       General: She is not in acute distress.     Appearance: Normal appearance. She is well-developed.   HENT:      Head: Normocephalic and atraumatic.   Eyes:      General: No scleral icterus.        Right eye: No discharge.         Left eye: No discharge.      Conjunctiva/sclera: Conjunctivae normal.   Neck:      Vascular: Carotid bruit present.   Cardiovascular:      Rate and Rhythm: Normal rate and regular rhythm.      Heart sounds: Normal heart sounds. No murmur. No friction rub. No gallop.    Pulmonary:      Effort: Pulmonary effort is normal. No respiratory distress.      Breath sounds: Normal breath sounds. No wheezing or rales.   Chest:      Chest wall: No tenderness.   Musculoskeletal:      Right lower leg: No edema.      Left lower leg: No edema.   Skin:     General: Skin is warm and dry.      Coloration: Skin is not pale.      Findings: No erythema or rash. "   Neurological:      Mental Status: She is alert and oriented to person, place, and time.      Cranial Nerves: No cranial nerve deficit.   Psychiatric:         Behavior: Behavior normal.         Procedure   Procedures       Assessment/Plan     Problems Addressed this Visit        Other    Coronary artery disease involving native coronary artery of native heart without angina pectoris - Primary    Essential hypertension    Relevant Medications    lisinopril (PRINIVIL,ZESTRIL) 40 MG tablet    PAD (peripheral artery disease) (CMS/[facility])    Stenosis of carotid artery      Diagnoses       Codes Comments    Coronary artery disease involving native coronary artery of native heart without angina pectoris    -  Primary ICD-10-CM: I25.10  ICD-9-CM: 414.01     Essential hypertension     ICD-10-CM: I10  ICD-9-CM: 401.9     Stenosis of carotid artery, unspecified laterality     ICD-10-CM: I65.29  ICD-9-CM: 433.10     PAD (peripheral artery disease) (CMS/[facility])     ICD-10-CM: I73.9  ICD-9-CM: 443.9           Recommendation  1.  Patient with coronary artery disease doing well at this time with no ischemic symptoms on appropriate medical therapy.  Lipids were reviewed and that is currently controlled and patient has quit smoking.    2.  Patient with peripheral arterial disease following the carotids and lower extremities.  Lower extremities followed by CT surgery but we will consider repeat carotid scan later in the year as well as repeat evaluation of infrarenal abdominal aortic aneurysm    3.  Baseline hypertension.  Patient has been on multidrug regimens and has had issues with medications.  She currently is taking lisinopril and clonidine.  Although not the best regimen to control hypertension, based on it being manageable, we will continue it    4.  Otherwise we will continue the same and see her back for follow-up in 4 to 6 months or sooner as symptoms discussed.  She is to follow-up with primary and CT surgery as scheduled            Lyndsay Franco  reports that she has quit smoking. Her smoking use included cigarettes and electronic cigarette. She has a 30.00 pack-year smoking history. She has never used smokeless tobacco.         Patient's Body mass index is 32.7 kg/m². BMI is above normal parameters. Recommendations include: educational material.       Advance Care Planning   ACP discussion was held with the patient during this visit. Patient does not have an advance directive, declines further assistance.    Electronically signed by:

## 2021-03-09 PROBLEM — I25.5 ISCHEMIC CARDIOMYOPATHY: Status: ACTIVE | Noted: 2021-01-01

## 2021-03-09 NOTE — PROGRESS NOTES
Problem list     Subjective   Lyndsay Franco is a 70 y.o. female     Chief Complaint   Patient presents with   • Follow-up     Hosp fu   • Coronary Artery Disease   PROBLEM LIST:         1.  Coronary artery disease  1.1 history of multiple coronary interventions including, but not limited to, multiple multivessel stenting procedures and most recently recanalization of a chronic total occlusion of the RCA at [facility] in 2019 with laser atherectomy  2.  Peripheral vascular disease  2.1 history of iliac stenting bilaterally    2.2 follow-up femorofemoral bypass by Dr. Dupont at [facility] in [place]  2.3 follow-up CT angiogram May 2018 demonstrates occluded iliacs with femorofemoral bypass noted but no discussion on report in regards to patency or disease  2.4 right renal artery stenting June 2018 by Dr. Mcallister and Dr. Dupont with no significant disease of the lower extremities identified  3.  Labile hypertension  4.  Infrarenal abdominal aortic aneurysm measuring 3 cm by CT scan May 2018  4.1 infrarenal abdominal aortic aneurysm measured by duplex at 3.4 cm  5.  Type 2 diabetes mellitus  6.  Dyslipidemia  7.  Chronic tobacco use  8.  Carotid artery stenosis  8.1 left carotid enterectomy July 2020 by Dr. Mcallister  8.2 acute thrombus postoperatively involving the left carotid with right-sided hemineglect.  Patient underwent repeat surgery with thrombectomy and closure       HPI    Patient is a 70-year-old female that presents back to the office for follow-up.  She has history of significant coronary disease and vasculopathy involving her carotids coronaries as well as her lower extremities with previous interventions as above.    Patient has done poorly since her last visit.  In fact, by review of hospital records, she had respiratory failure and what appeared to be a COPD exacerbation and pneumonia.  She was on the ventilator apparently for extended period of time before eventually being weaned.   She apparently was negative for coronavirus.  Patient was eventually discharged.    She followed up with primary and medications were adjusted.  Blood pressure medications were held or stopped as apparently there was some issue.    She is doing much better.  Patient's daughter-in-law accompanies her and shows me pictures of how poorly she was doing.  She appears much better today and her blood pressure has improved.    She does not describe any chest pain or pressure.  She does have mild to moderate levels of dyspnea and has stopped tobacco use which we applaud.  She does not describe PND orthopnea.    She does not describe palpitations.  No presyncope or syncope.  She is recovering and appears to be doing better      Current Outpatient Medications on File Prior to Visit   Medication Sig Dispense Refill   • aspirin 81 MG tablet Take 81 mg by mouth 2 (Two) Times a Day. 30 tablet 11   • atorvastatin (LIPITOR) 40 MG tablet TAKE 1 TABLET BY MOUTH DAILY. 30 tablet 11   • clopidogrel (PLAVIX) 75 MG tablet Take 75 mg by mouth Daily.     • furosemide (LASIX) 20 MG tablet Take 20 mg by mouth As Needed.     • hydroCHLOROthiazide (HYDRODIURIL) 25 MG tablet Take 25 mg by mouth Daily.     • HYDROcodone-acetaminophen (NORCO)  MG per tablet prn     • hydrOXYzine (ATARAX) 10 MG tablet Take 10 mg by mouth 3 (Three) Times a Day As Needed for Itching.     • insulin glargine (LANTUS, SEMGLEE) 100 UNIT/ML injection Inject  under the skin into the appropriate area as directed Daily.     • loratadine (CLARITIN) 10 MG tablet Take 10 mg by mouth Daily.     • NIFEdipine XL (PROCARDIA XL) 60 MG 24 hr tablet Take 60 mg by mouth Daily.     • nitroglycerin (NITROSTAT) 0.4 MG SL tablet Take 1 tablet by mouth As Needed for Chest Pain. 25 tablet 1   • ondansetron (ZOFRAN) 4 MG tablet Take 4 mg by mouth Every 8 (Eight) Hours As Needed for Nausea or Vomiting.     • pantoprazole (PROTONIX) 40 MG EC tablet Take 40 mg by mouth Daily.     •  sertraline (ZOLOFT) 25 MG tablet Take 25 mg by mouth Daily.     • CHANTIX CONTINUING MONTH KARIE 1 MG tablet AS PRESCRIBED (Patient taking differently: Take 1 mg by mouth 2 (Two) Times a Day. As prescribed) 56 tablet 2   • cloNIDine (Catapres) 0.2 MG tablet Take 1/2 tab BID (Patient taking differently: 0.1 mg. Take 1/2 tab BID) 30 tablet 5   • glimepiride (AMARYL) 1 MG tablet Take 1 tablet by mouth Daily.     • isosorbide mononitrate (IMDUR) 60 MG 24 hr tablet Take 1 tablet by mouth Daily. 30 tablet 5   • lisinopril (PRINIVIL,ZESTRIL) 40 MG tablet Take 1 tablet by mouth 2 (two) times a day. 60 tablet 5   • metFORMIN (GLUCOPHAGE) 850 MG tablet Take 850 mg by mouth 3 (Three) Times a Day.     • promethazine (PHENERGAN) 12.5 MG tablet As Needed.     • senna (SENOKOT) 8.6 MG tablet tablet Take  by mouth Daily As Needed.     • traZODone (DESYREL) 50 MG tablet Take 50 mg by mouth Every Night.       No current facility-administered medications on file prior to visit.       Patient has no known allergies.    Past Medical History:   Diagnosis Date   • Anxiety    • Arthritis    • Coronary artery disease     stents aug 10, 2020  maker   • Depression    • Diabetes mellitus (CMS/HCC)     type dm, diagnosed approx age 47, check blood sugar bid   • Frequent headaches    • GERD (gastroesophageal reflux disease)    • Hiatal hernia    • History of anemia    • Hyperlipidemia    • Hypertension    • Myocardial infarction (CMS/HCC)     patient unsure of date   • Non-functioning kidney     Left    • Peripheral vascular disease (CMS/HCC)    • Renal artery aneurysm (CMS/HCC)    • Stenosis of carotid artery 3/11/2020   • Systolic ejection murmur 3/27/2018   • Wears dentures     top, partial bottom   • Wears reading eyeglasses        Social History     Socioeconomic History   • Marital status:      Spouse name: Not on file   • Number of children: 3   • Years of education: Not on file   • Highest education level: Not on file    "  Tobacco Use   • Smoking status: Former Smoker     Packs/day: 0.75     Years: 40.00     Pack years: 30.00     Types: Cigarettes, Electronic Cigarette   • Smokeless tobacco: Never Used   • Tobacco comment: Quit Aug 2019   Substance and Sexual Activity   • Alcohol use: No   • Drug use: No   • Sexual activity: Defer       Family History   Adopted: Yes   Problem Relation Age of Onset   • No Known Problems Mother    • No Known Problems Father        Review of Systems   Constitutional: Negative.    HENT: Negative.    Eyes: Negative.  Negative for visual disturbance.   Respiratory: Positive for shortness of breath. Negative for apnea and chest tightness.    Cardiovascular: Negative for chest pain, palpitations and leg swelling.   Gastrointestinal: Negative.    Endocrine: Negative.    Genitourinary: Negative.    Musculoskeletal: Positive for arthralgias, back pain, gait problem (uses walker), neck pain and neck stiffness.   Skin: Negative.  Negative for rash and wound.   Allergic/Immunologic: Negative.  Negative for environmental allergies and food allergies.   Neurological: Negative for dizziness, weakness, light-headedness, numbness and headaches.   Hematological: Bruises/bleeds easily.   Psychiatric/Behavioral: Negative for sleep disturbance.       Objective   Vitals:    03/09/21 1022   BP: 108/75   BP Location: Left arm   Patient Position: Sitting   Pulse: 107   SpO2: 95%   Weight: 78.2 kg (172 lb 6.4 oz)   Height: 162.6 cm (64.02\")      /75 (BP Location: Left arm, Patient Position: Sitting)   Pulse 107   Ht 162.6 cm (64.02\")   Wt 78.2 kg (172 lb 6.4 oz)   SpO2 95%   BMI 29.58 kg/m²     Lab Results (most recent)     None          Physical Exam  Vitals and nursing note reviewed.   Constitutional:       General: She is not in acute distress.     Appearance: Normal appearance. She is well-developed.   HENT:      Head: Normocephalic and atraumatic.   Eyes:      General: No scleral icterus.        Right eye: No " discharge.         Left eye: No discharge.      Conjunctiva/sclera: Conjunctivae normal.   Neck:      Vascular: Carotid bruit present.   Cardiovascular:      Rate and Rhythm: Normal rate and regular rhythm.      Heart sounds: Normal heart sounds. No murmur. No friction rub. No gallop.    Pulmonary:      Effort: Pulmonary effort is normal. No respiratory distress.      Breath sounds: Normal breath sounds. No wheezing or rales.   Chest:      Chest wall: No tenderness.   Musculoskeletal:      Right lower leg: No edema.      Left lower leg: No edema.   Skin:     General: Skin is warm and dry.      Coloration: Skin is not pale.      Findings: No erythema or rash.   Neurological:      Mental Status: She is alert and oriented to person, place, and time.      Cranial Nerves: No cranial nerve deficit.   Psychiatric:         Behavior: Behavior normal.         Procedure   Procedures       Assessment/Plan     Problems Addressed this Visit        Cardiac and Vasculature    Coronary artery disease involving native coronary artery of native heart without angina pectoris - Primary    Relevant Medications    NIFEdipine XL (PROCARDIA XL) 60 MG 24 hr tablet    Essential hypertension    Relevant Medications    NIFEdipine XL (PROCARDIA XL) 60 MG 24 hr tablet    hydroCHLOROthiazide (HYDRODIURIL) 25 MG tablet    PAD (peripheral artery disease) (CMS/[facility])    Ischemic cardiomyopathy    Relevant Medications    NIFEdipine XL (PROCARDIA XL) 60 MG 24 hr tablet      Diagnoses       Codes Comments    Coronary artery disease involving native coronary artery of native heart without angina pectoris    -  Primary ICD-10-CM: I25.10  ICD-9-CM: 414.01     Essential hypertension     ICD-10-CM: I10  ICD-9-CM: 401.9     Ischemic cardiomyopathy     ICD-10-CM: I25.5  ICD-9-CM: 414.8     PAD (peripheral artery disease) (CMS/HCC)     ICD-10-CM: I73.9  ICD-9-CM: 443.9             Recommendation  1.  Patient is recovering and appears to be doing much better.   Patient's respiratory status has improved.  She does not complain of any chest pain or pressure.  Her dyspnea is mild to moderate and she has quit using tobacco.    2.  She does not complain of any angina at this time and for now we will monitor.  Her dyspnea has improved so we will continue to monitor    3.  Ischemic cardiomyopathy.  Apparently she had an echocardiogram in the hospital suggesting an EF of 35 to 40%.  Our last evaluation was at 45 to 50%.  Because of her blood pressure, I do not feel that we can add any medications right now.  We will likely repeat echocardiogram to evaluate in the coming months    4.  With a history of peripheral chill disease, she does not complain of any claudication symptoms.  Later in the year we need to reevaluate patient's aortic aneurysm as well as her carotid disease.    5.  We will see her back for follow-up in 2 to 3 months.  She is to follow with primary as scheduled         Lyndsay Franco  reports that she has quit smoking. Her smoking use included cigarettes and electronic cigarette. She has a 30.00 pack-year smoking history. She has never used smokeless tobacco.        Patient's Body mass index is 29.58 kg/m². BMI is above normal parameters. Recommendations include: educational material.       Advance Care Planning   ACP discussion was held with the patient during this visit. Patient does not have an advance directive, declines further assistance.    Electronically signed by:

## 2021-03-09 NOTE — PATIENT INSTRUCTIONS
Advance Care Planning and Advance Directives     You make decisions on a daily basis - decisions about where you want to live, your career, your home, your life. Perhaps one of the most important decisions you face is your choice for future medical care. Take time to talk with your family and your healthcare team and start planning today.  Advance Care Planning is a process that can help you:  · Understand possible future healthcare decisions in light of your own experiences  · Reflect on those decision in light of your goals and values  · Discuss your decisions with those closest to you and the healthcare professionals that care for you  · Make a plan by creating a document that reflects your wishes    Surrogate Decision Maker  In the event of a medical emergency, which has left you unable to communicate or to make your own decisions, you would need someone to make decisions for you.  It is important to discuss your preferences for medical treatment with this person while you are in good health.     Qualities of a surrogate decision maker:  • Willing to take on this role and responsibility  • Knows what you want for future medical care  • Willing to follow your wishes even if they don't agree with them  • Able to make difficult medical decisions under stressful circumstances    Advance Directives  These are legal documents you can create that will guide your healthcare team and decision maker(s) when needed. These documents can be stored in the electronic medical record.    · Living Will - a legal document to guide your care if you have a terminal condition or a serious illness and are unable to communicate. States vary by statute in document names/types, but most forms may include one or more of the following:        -  Directions regarding life-prolonging treatments        -  Directions regarding artificially provided nutrition/hydration        -  Choosing a healthcare decision maker        -  Direction  regarding organ/tissue donation    · Durable Power of  for Healthcare - this document names an -in-fact to make medical decisions for you, but it may also allow this person to make personal and financial decisions for you. Please seek the advice of an  if you need this type of document.    **Advance Directives are not required and no one may discriminate against you if you do not sign one.    Medical Orders  Many states allow specific forms/orders signed by your physician to record your wishes for medical treatment in your current state of health. This form, signed in personal communication with your physician, addresses resuscitation and other medical interventions that you may or may not want.      For more information or to schedule a time with a [facility] Advance Care Planning Facilitator contact: [facility] or call [phone] and someone will contact you directly.Heart-Healthy Eating Plan  Heart-healthy meal planning includes:  · Eating less unhealthy fats.  · Eating more healthy fats.  · Making other changes in your diet.  Talk with your doctor or a diet specialist (dietitian) to create an eating plan that is right for you.  What is my plan?  Your doctor may recommend an eating plan that includes:  · Total fat: ______% or less of total calories a day.  · Saturated fat: ______% or less of total calories a day.  · Cholesterol: less than _________mg a day.  What are tips for following this plan?  Cooking  Avoid frying your food. Try to bake, boil, grill, or broil it instead. You can also reduce fat by:  · Removing the skin from poultry.  · Removing all visible fats from meats.  · Steaming vegetables in water or broth.  Meal planning    · At meals, divide your plate into four equal parts:  ? Fill one-half of your plate with vegetables and green salads.  ? Fill one-fourth of your plate with whole grains.  ? Fill one-fourth of your plate with lean protein foods.  · Eat 4-5  servings of vegetables per day. A serving of vegetables is:  ? 1 cup of raw or cooked vegetables.  ? 2 cups of raw leafy greens.  · Eat 4-5 servings of fruit per day. A serving of fruit is:  ? 1 medium whole fruit.  ? ¼ cup of dried fruit.  ? ½ cup of fresh, frozen, or canned fruit.  ? ½ cup of 100% fruit juice.  · Eat more foods that have soluble fiber. These are apples, broccoli, carrots, beans, peas, and barley. Try to get 20-30 g of fiber per day.  · Eat 4-5 servings of nuts, legumes, and seeds per week:  ? 1 serving of dried beans or legumes equals ½ cup after being cooked.  ? 1 serving of nuts is ¼ cup.  ? 1 serving of seeds equals 1 tablespoon.  General information  · Eat more home-cooked food. Eat less restaurant, buffet, and fast food.  · Limit or avoid alcohol.  · Limit foods that are high in starch and sugar.  · Avoid fried foods.  · Lose weight if you are overweight.  · Keep track of how much salt (sodium) you eat. This is important if you have high blood pressure. Ask your doctor to tell you more about this.  · Try to add vegetarian meals each week.  Fats  · Choose healthy fats. These include olive oil and canola oil, flaxseeds, walnuts, almonds, and seeds.  · Eat more omega-3 fats. These include salmon, mackerel, sardines, tuna, flaxseed oil, and ground flaxseeds. Try to eat fish at least 2 times each week.  · Check food labels. Avoid foods with trans fats or high amounts of saturated fat.  · Limit saturated fats.  ? These are often found in animal products, such as meats, butter, and cream.  ? These are also found in plant foods, such as palm oil, palm kernel oil, and coconut oil.  · Avoid foods with partially hydrogenated oils in them. These have trans fats. Examples are stick margarine, some tub margarines, cookies, crackers, and other baked goods.  What foods can I eat?  Fruits  All fresh, canned (in natural juice), or frozen fruits.  Vegetables  Fresh or frozen vegetables (raw, steamed, roasted,  or grilled). Green salads.  Grains  Most grains. Choose whole wheat and whole grains most of the time. Rice and pasta, including brown rice and pastas made with whole wheat.  Meats and other proteins  Lean, well-trimmed beef, veal, pork, and lamb. Chicken and turkey without skin. All fish and shellfish. Wild duck, rabbit, pheasant, and venison. Egg whites or low-cholesterol egg substitutes. Dried beans, peas, lentils, and tofu. Seeds and most nuts.  Dairy  Low-fat or nonfat cheeses, including ricotta and mozzarella. Skim or 1% milk that is liquid, powdered, or evaporated. Buttermilk that is made with low-fat milk. Nonfat or low-fat yogurt.  Fats and oils  Non-hydrogenated (trans-free) margarines. Vegetable oils, including soybean, sesame, sunflower, olive, peanut, safflower, corn, canola, and cottonseed. Salad dressings or mayonnaise made with a vegetable oil.  Beverages  Mineral water. Coffee and tea. Diet carbonated beverages.  Sweets and desserts  Sherbet, gelatin, and fruit ice. Small amounts of dark chocolate.  Limit all sweets and desserts.  Seasonings and condiments  All seasonings and condiments.  The items listed above may not be a complete list of foods and drinks you can eat. Contact a dietitian for more options.  What foods should I avoid?  Fruits  Canned fruit in heavy syrup. Fruit in cream or butter sauce. Fried fruit. Limit coconut.  Vegetables  Vegetables cooked in cheese, cream, or butter sauce. Fried vegetables.  Grains  Breads that are made with saturated or trans fats, oils, or whole milk. Croissants. Sweet rolls. Donuts. High-fat crackers, such as cheese crackers.  Meats and other proteins  Fatty meats, such as hot dogs, ribs, sausage, smith, rib-eye roast or steak. High-fat deli meats, such as salami and bologna. Caviar. Domestic duck and goose. Organ meats, such as liver.  Dairy  Cream, sour cream, cream cheese, and creamed cottage cheese. Whole-milk cheeses. Whole or 2% milk that is liquid,  evaporated, or condensed. Whole buttermilk. Cream sauce or high-fat cheese sauce. Yogurt that is made from whole milk.  Fats and oils  Meat fat, or shortening. Cocoa butter, hydrogenated oils, palm oil, coconut oil, palm kernel oil. Solid fats and shortenings, including smith fat, salt pork, lard, and butter. Nondairy cream substitutes. Salad dressings with cheese or sour cream.  Beverages  Regular sodas and juice drinks with added sugar.  Sweets and desserts  Frosting. Pudding. Cookies. Cakes. Pies. Milk chocolate or white chocolate. Buttered syrups. Full-fat ice cream or ice cream drinks.  The items listed above may not be a complete list of foods and drinks to avoid. Contact a dietitian for more information.  Summary  · Heart-healthy meal planning includes eating less unhealthy fats, eating more healthy fats, and making other changes in your diet.  · Eat a balanced diet. This includes fruits and vegetables, low-fat or nonfat dairy, lean protein, nuts and legumes, whole grains, and heart-healthy oils and fats.  This information is not intended to replace advice given to you by your health care provider. Make sure you discuss any questions you have with your health care provider.  Document Revised: 02/21/2019 Document Reviewed: 01/25/2019  [employer] Patient Education © 2020 [employer]

## 2021-06-10 NOTE — PROGRESS NOTES
Problem list     Subjective   Lyndsay Franco is a 71 y.o. female     Chief Complaint   Patient presents with   • Follow-up   • Coronary Artery Disease   PROBLEM LIST:         1.  Coronary artery disease  1.1 history of multiple coronary interventions including, but not limited to, multiple multivessel stenting procedures and most recently recanalization of a chronic total occlusion of the RCA at [facility] in 2019 with laser atherectomy  2.  Peripheral vascular disease  2.1 history of iliac stenting bilaterally    2.2 follow-up femorofemoral bypass by Dr. Dupont at [facility] in [place]  2.3 follow-up CT angiogram May 2018 demonstrates occluded iliacs with femorofemoral bypass noted but no discussion on report in regards to patency or disease  2.4 right renal artery stenting June 2018 by Dr. Mcallister and Dr. Dupont with no significant disease of the lower extremities identified  3.  Labile hypertension  4.  Infrarenal abdominal aortic aneurysm measuring 3 cm by CT scan May 2018  4.1 infrarenal abdominal aortic aneurysm measured by duplex at 3.4 cm  5.  Type 2 diabetes mellitus  6.  Dyslipidemia  7.  Chronic tobacco use  8.  Carotid artery stenosis  8.1 left carotid enterectomy July 2020 by Dr. Mcallister  8.2 acute thrombus postoperatively involving the left carotid with right-sided hemineglect.  Patient underwent repeat surgery with thrombectomy and closure    HPI    Patient is a 71-year-old female that presents back to the office for follow-up.  She has done well since her last visit here.  She does not complain of any chest pain or pressure.  She has mild stable levels of dyspnea with no progression.  No complaints of PND orthopnea.    She occasionally palpitate.  She gets under stress and anxiety which causes her blood pressure to increase her start having palpitations.  She does not describe strokelike symptoms.  She does not describe dizziness presyncope or syncope.  Otherwise appears to be  stable      Current Outpatient Medications on File Prior to Visit   Medication Sig Dispense Refill   • albuterol sulfate  (90 Base) MCG/ACT inhaler Inhale 2 puffs Every 4 (Four) Hours As Needed for Wheezing.     • aspirin 81 MG tablet Take 81 mg by mouth 2 (Two) Times a Day. 30 tablet 11   • atorvastatin (LIPITOR) 40 MG tablet TAKE 1 TABLET BY MOUTH DAILY. 30 tablet 11   • cephalexin (KEFLEX) 500 MG capsule Take 500 mg by mouth 2 (Two) Times a Day.     • cloNIDine (CATAPRES) 0.3 MG tablet Take 0.3 mg by mouth 2 (Two) Times a Day.     • clopidogrel (PLAVIX) 75 MG tablet Take 75 mg by mouth Daily.     • docusate sodium (COLACE) 100 MG capsule Take 100 mg by mouth 2 (Two) Times a Day.     • famotidine (PEPCID) 20 MG tablet Take 20 mg by mouth 2 (Two) Times a Day.     • HYDROcodone-acetaminophen (NORCO)  MG per tablet prn     • hydrOXYzine (ATARAX) 10 MG tablet Take 10 mg by mouth 3 (Three) Times a Day As Needed for Itching.     • loratadine (CLARITIN) 10 MG tablet Take 10 mg by mouth Daily.     • losartan (COZAAR) 100 MG tablet Take 100 mg by mouth Daily.     • nitroglycerin (NITROSTAT) 0.4 MG SL tablet Take 1 tablet by mouth As Needed for Chest Pain. 25 tablet 1   • pantoprazole (PROTONIX) 40 MG EC tablet Take 40 mg by mouth Daily.     • Semaglutide (Rybelsus) 7 MG tablet Take  by mouth.     • sertraline (ZOLOFT) 25 MG tablet Take 25 mg by mouth Daily.     • traZODone (DESYREL) 50 MG tablet Take 50 mg by mouth Every Night.     • vitamin D3 125 MCG (5000 UT) capsule capsule Take 5,000 Units by mouth Daily.     • CHANTIX CONTINUING MONTH KARIE 1 MG tablet AS PRESCRIBED (Patient taking differently: Take 1 mg by mouth 2 (Two) Times a Day. As prescribed) 56 tablet 2   • cloNIDine (Catapres) 0.2 MG tablet Take 1/2 tab BID (Patient taking differently: 0.1 mg. Take 1/2 tab BID) 30 tablet 5   • furosemide (LASIX) 20 MG tablet Take 20 mg by mouth As Needed.     • glimepiride (AMARYL) 1 MG tablet Take 1 tablet by  mouth Daily.     • hydroCHLOROthiazide (HYDRODIURIL) 25 MG tablet Take 25 mg by mouth Daily.     • insulin glargine (LANTUS, SEMGLEE) 100 UNIT/ML injection Inject  under the skin into the appropriate area as directed Daily.     • isosorbide mononitrate (IMDUR) 60 MG 24 hr tablet Take 1 tablet by mouth Daily. 30 tablet 5   • lisinopril (PRINIVIL,ZESTRIL) 40 MG tablet Take 1 tablet by mouth 2 (two) times a day. 60 tablet 5   • metFORMIN (GLUCOPHAGE) 850 MG tablet Take 850 mg by mouth As Needed.     • NIFEdipine XL (PROCARDIA XL) 60 MG 24 hr tablet Take 60 mg by mouth Daily.     • ondansetron (ZOFRAN) 4 MG tablet Take 4 mg by mouth Every 8 (Eight) Hours As Needed for Nausea or Vomiting.     • promethazine (PHENERGAN) 12.5 MG tablet As Needed.     • senna (SENOKOT) 8.6 MG tablet tablet Take  by mouth Daily As Needed.       No current facility-administered medications on file prior to visit.       Patient has no known allergies.    Past Medical History:   Diagnosis Date   • Anxiety    • Arthritis    • Coronary artery disease     stents aug 10, 2020  maker   • Depression    • Diabetes mellitus (CMS/HCC)     type dm, diagnosed approx age 47, check blood sugar bid   • Frequent headaches    • GERD (gastroesophageal reflux disease)    • Hiatal hernia    • History of anemia    • Hyperlipidemia    • Hypertension    • Myocardial infarction (CMS/HCC)     patient unsure of date   • Non-functioning kidney     Left    • Peripheral vascular disease (CMS/HCC)    • Renal artery aneurysm (CMS/HCC)    • Stenosis of carotid artery 3/11/2020   • Systolic ejection murmur 3/27/2018   • Wears dentures     top, partial bottom   • Wears reading eyeglasses        Social History     Socioeconomic History   • Marital status:      Spouse name: Not on file   • Number of children: 3   • Years of education: Not on file   • Highest education level: Not on file   Tobacco Use   • Smoking status: Former Smoker     Packs/day: 0.75     Years:  "40.00     Pack years: 30.00     Types: Cigarettes, Electronic Cigarette   • Smokeless tobacco: Never Used   • Tobacco comment: Quit Aug 2019   Substance and Sexual Activity   • Alcohol use: No   • Drug use: No   • Sexual activity: Defer       Family History   Adopted: Yes   Problem Relation Age of Onset   • No Known Problems Mother    • No Known Problems Father        Review of Systems   Constitutional: Positive for fatigue. Negative for chills and fever.   HENT: Negative.  Negative for congestion, rhinorrhea and sore throat.    Respiratory: Positive for shortness of breath. Negative for apnea and chest tightness.    Cardiovascular: Positive for palpitations. Negative for chest pain and leg swelling.   Gastrointestinal: Negative.    Endocrine: Negative.    Genitourinary: Negative.    Musculoskeletal: Positive for arthralgias and back pain. Negative for gait problem, neck pain and neck stiffness.   Skin: Negative.  Negative for rash and wound.   Allergic/Immunologic: Negative.  Negative for environmental allergies and food allergies.   Neurological: Positive for dizziness (when standing quickly), weakness and numbness (hands/feet). Negative for light-headedness and headaches.   Hematological: Bruises/bleeds easily.   Psychiatric/Behavioral: Positive for sleep disturbance (denies SoA at HS).       Objective   Vitals:    06/10/21 0923   BP: 153/66   BP Location: Left arm   Patient Position: Sitting   Pulse: 76   SpO2: 96%   Weight: 81.6 kg (180 lb)   Height: 162.6 cm (64.02\")      /66 (BP Location: Left arm, Patient Position: Sitting)   Pulse 76   Ht 162.6 cm (64.02\")   Wt 81.6 kg (180 lb)   SpO2 96%   BMI 30.88 kg/m²     Lab Results (most recent)     None          Physical Exam  Vitals and nursing note reviewed.   Constitutional:       General: She is not in acute distress.     Appearance: Normal appearance. She is well-developed.   HENT:      Head: Normocephalic and atraumatic.   Eyes:      General: No " scleral icterus.        Right eye: No discharge.         Left eye: No discharge.      Conjunctiva/sclera: Conjunctivae normal.   Neck:      Vascular: Carotid bruit present.   Cardiovascular:      Rate and Rhythm: Normal rate and regular rhythm.      Heart sounds: Normal heart sounds. No murmur heard.   No friction rub. No gallop.    Pulmonary:      Effort: Pulmonary effort is normal. No respiratory distress.      Breath sounds: Normal breath sounds. No wheezing or rales.   Chest:      Chest wall: No tenderness.   Musculoskeletal:      Right lower leg: No edema.      Left lower leg: No edema.   Skin:     General: Skin is warm and dry.      Coloration: Skin is not pale.      Findings: No erythema or rash.   Neurological:      Mental Status: She is alert and oriented to person, place, and time.      Cranial Nerves: No cranial nerve deficit.   Psychiatric:         Behavior: Behavior normal.         Procedure   Procedures       Assessment/Plan     Problems Addressed this Visit        Cardiac and Vasculature    Coronary artery disease involving native coronary artery of native heart without angina pectoris - Primary    Essential hypertension    Relevant Medications    losartan (COZAAR) 100 MG tablet    cloNIDine (CATAPRES) 0.3 MG tablet    PVD (peripheral vascular disease) with claudication (CMS/HCC)    AAA (abdominal aortic aneurysm) without rupture (CMS/HCC)    Relevant Orders    US Aorta Limited       Pulmonary and Pneumonias    Shortness of breath      Diagnoses       Codes Comments    Coronary artery disease involving native coronary artery of native heart without angina pectoris    -  Primary ICD-10-CM: I25.10  ICD-9-CM: 414.01     PVD (peripheral vascular disease) with claudication (CMS/HCC)     ICD-10-CM: I73.9  ICD-9-CM: 443.9     AAA (abdominal aortic aneurysm) without rupture (CMS/HCC)     ICD-10-CM: I71.4  ICD-9-CM: 441.4     Shortness of breath     ICD-10-CM: R06.02  ICD-9-CM: 786.05     Essential  hypertension     ICD-10-CM: I10  ICD-9-CM: 401.9         Recommendation  1.  Patient with coronary disease that appears to be doing well with no chest pain.  She has stable levels of dyspnea for now we will continue to monitor    2.  Abdominal aortic aneurysm.  We will repeat abdominal screening ultrasound to be performed in [address] [place] [address]    3.  Stable levels of dyspnea the patient is smoked for decades.  She has quit which we applaud at this time.    4.  Patient with peripheral vascular disease with occasional lower extremity discomfort but much better after revascularization.    5.  We discussed her hypertension.  She is doing well at this time and will use clonidine as needed.  She is to call us with blood pressure gets to where it is not controlled.  She is to follow-up with primary as scheduled and in our office after testing    Advance Care Planning   ACP discussion was held with the patient during this visit. Patient does not have an advance directive, declines further assistance.      Electronically signed by:

## 2021-06-10 NOTE — PATIENT INSTRUCTIONS
Advance Care Planning and Advance Directives     You make decisions on a daily basis - decisions about where you want to live, your career, your home, your life. Perhaps one of the most important decisions you face is your choice for future medical care. Take time to talk with your family and your healthcare team and start planning today.  Advance Care Planning is a process that can help you:  · Understand possible future healthcare decisions in light of your own experiences  · Reflect on those decision in light of your goals and values  · Discuss your decisions with those closest to you and the healthcare professionals that care for you  · Make a plan by creating a document that reflects your wishes    Surrogate Decision Maker  In the event of a medical emergency, which has left you unable to communicate or to make your own decisions, you would need someone to make decisions for you.  It is important to discuss your preferences for medical treatment with this person while you are in good health.     Qualities of a surrogate decision maker:  • Willing to take on this role and responsibility  • Knows what you want for future medical care  • Willing to follow your wishes even if they don't agree with them  • Able to make difficult medical decisions under stressful circumstances    Advance Directives  These are legal documents you can create that will guide your healthcare team and decision maker(s) when needed. These documents can be stored in the electronic medical record.    · Living Will - a legal document to guide your care if you have a terminal condition or a serious illness and are unable to communicate. States vary by statute in document names/types, but most forms may include one or more of the following:        -  Directions regarding life-prolonging treatments        -  Directions regarding artificially provided nutrition/hydration        -  Choosing a healthcare decision maker        -  Direction  regarding organ/tissue donation    · Durable Power of  for Healthcare - this document names an -in-fact to make medical decisions for you, but it may also allow this person to make personal and financial decisions for you. Please seek the advice of an  if you need this type of document.    **Advance Directives are not required and no one may discriminate against you if you do not sign one.    Medical Orders  Many states allow specific forms/orders signed by your physician to record your wishes for medical treatment in your current state of health. This form, signed in personal communication with your physician, addresses resuscitation and other medical interventions that you may or may not want.      For more information or to schedule a time with a [facility] Advance Care Planning Facilitator contact: [facility] [address] [facility] or call [phone] and someone will contact you directly.Heart-Healthy Eating Plan  Heart-healthy meal planning includes:  · Eating less unhealthy fats.  · Eating more healthy fats.  · Making other changes in your diet.  Talk with your doctor or a diet specialist (dietitian) to create an eating plan that is right for you.  What is my plan?  Your doctor may recommend an eating plan that includes:  · Total fat: ______% or less of total calories a day.  · Saturated fat: ______% or less of total calories a day.  · Cholesterol: less than _________mg a day.  What are tips for following this plan?  Cooking  Avoid frying your food. Try to bake, boil, grill, or broil it instead. You can also reduce fat by:  · Removing the skin from poultry.  · Removing all visible fats from meats.  · Steaming vegetables in water or broth.  Meal planning    · At meals, divide your plate into four equal parts:  ? Fill one-half of your plate with vegetables and green salads.  ? Fill one-fourth of your plate with whole grains.  ? Fill one-fourth of your plate with lean protein foods.  · Eat 4-5  servings of vegetables per day. A serving of vegetables is:  ? 1 cup of raw or cooked vegetables.  ? 2 cups of raw leafy greens.  · Eat 4-5 servings of fruit per day. A serving of fruit is:  ? 1 medium whole fruit.  ? ¼ cup of dried fruit.  ? ½ cup of fresh, frozen, or canned fruit.  ? ½ cup of 100% fruit juice.  · Eat more foods that have soluble fiber. These are apples, broccoli, carrots, beans, peas, and barley. Try to get 20-30 g of fiber per day.  · Eat 4-5 servings of nuts, legumes, and seeds per week:  ? 1 serving of dried beans or legumes equals ½ cup after being cooked.  ? 1 serving of nuts is ¼ cup.  ? 1 serving of seeds equals 1 tablespoon.  General information  · Eat more home-cooked food. Eat less restaurant, buffet, and fast food.  · Limit or avoid alcohol.  · Limit foods that are high in starch and sugar.  · Avoid fried foods.  · Lose weight if you are overweight.  · Keep track of how much salt (sodium) you eat. This is important if you have high blood pressure. Ask your doctor to tell you more about this.  · Try to add vegetarian meals each week.  Fats  · Choose healthy fats. These include olive oil and canola oil, flaxseeds, walnuts, almonds, and seeds.  · Eat more omega-3 fats. These include salmon, mackerel, sardines, tuna, flaxseed oil, and ground flaxseeds. Try to eat fish at least 2 times each week.  · Check food labels. Avoid foods with trans fats or high amounts of saturated fat.  · Limit saturated fats.  ? These are often found in animal products, such as meats, butter, and cream.  ? These are also found in plant foods, such as palm oil, palm kernel oil, and coconut oil.  · Avoid foods with partially hydrogenated oils in them. These have trans fats. Examples are stick margarine, some tub margarines, cookies, crackers, and other baked goods.  What foods can I eat?  Fruits  All fresh, canned (in natural juice), or frozen fruits.  Vegetables  Fresh or frozen vegetables (raw, steamed, roasted,  or grilled). Green salads.  Grains  Most grains. Choose whole wheat and whole grains most of the time. Rice and pasta, including brown rice and pastas made with whole wheat.  Meats and other proteins  Lean, well-trimmed beef, veal, pork, and lamb. Chicken and turkey without skin. All fish and shellfish. Wild duck, rabbit, pheasant, and venison. Egg whites or low-cholesterol egg substitutes. Dried beans, peas, lentils, and tofu. Seeds and most nuts.  Dairy  Low-fat or nonfat cheeses, including ricotta and mozzarella. Skim or 1% milk that is liquid, powdered, or evaporated. Buttermilk that is made with low-fat milk. Nonfat or low-fat yogurt.  Fats and oils  Non-hydrogenated (trans-free) margarines. Vegetable oils, including soybean, sesame, sunflower, olive, peanut, safflower, corn, canola, and cottonseed. Salad dressings or mayonnaise made with a vegetable oil.  Beverages  Mineral water. Coffee and tea. Diet carbonated beverages.  Sweets and desserts  Sherbet, gelatin, and fruit ice. Small amounts of dark chocolate.  Limit all sweets and desserts.  Seasonings and condiments  All seasonings and condiments.  The items listed above may not be a complete list of foods and drinks you can eat. Contact a dietitian for more options.  What foods should I avoid?  Fruits  Canned fruit in heavy syrup. Fruit in cream or butter sauce. Fried fruit. Limit coconut.  Vegetables  Vegetables cooked in cheese, cream, or butter sauce. Fried vegetables.  Grains  Breads that are made with saturated or trans fats, oils, or whole milk. Croissants. Sweet rolls. Donuts. High-fat crackers, such as cheese crackers.  Meats and other proteins  Fatty meats, such as hot dogs, ribs, sausage, smith, rib-eye roast or steak. High-fat deli meats, such as salami and bologna. Caviar. Domestic duck and goose. Organ meats, such as liver.  Dairy  Cream, sour cream, cream cheese, and creamed cottage cheese. Whole-milk cheeses. Whole or 2% milk that is liquid,  evaporated, or condensed. Whole buttermilk. Cream sauce or high-fat cheese sauce. Yogurt that is made from whole milk.  Fats and oils  Meat fat, or shortening. Cocoa butter, hydrogenated oils, palm oil, coconut oil, palm kernel oil. Solid fats and shortenings, including smith fat, salt pork, lard, and butter. Nondairy cream substitutes. Salad dressings with cheese or sour cream.  Beverages  Regular sodas and juice drinks with added sugar.  Sweets and desserts  Frosting. Pudding. Cookies. Cakes. Pies. Milk chocolate or white chocolate. Buttered syrups. Full-fat ice cream or ice cream drinks.  The items listed above may not be a complete list of foods and drinks to avoid. Contact a dietitian for more information.  Summary  · Heart-healthy meal planning includes eating less unhealthy fats, eating more healthy fats, and making other changes in your diet.  · Eat a balanced diet. This includes fruits and vegetables, low-fat or nonfat dairy, lean protein, nuts and legumes, whole grains, and heart-healthy oils and fats.  This information is not intended to replace advice given to you by your health care provider. Make sure you discuss any questions you have with your health care provider.  Document Revised: 02/21/2019 Document Reviewed: 01/25/2019  [employer] Patient Education © 2021 [employer]

## 2021-06-14 NOTE — TELEPHONE ENCOUNTER
----- Message from MELISSA Webb sent at 6/14/2021  3:45 PM EDT -----  Let patient know that the aneurysm is stable

## 2021-06-14 NOTE — TELEPHONE ENCOUNTER
Spoke with patient regarding results. Verbalized understanding and has no further questions at this time. DAMARIS, CMA

## 2021-10-29 ENCOUNTER — TELEPHONE (OUTPATIENT)
Dept: CARDIOLOGY | Facility: CLINIC | Age: 71
End: 2021-10-29

## 2021-10-29 NOTE — TELEPHONE ENCOUNTER
----- Message from Amish Mckinney MA sent at 10/28/2021  4:53 PM EDT -----  SAHIL DOBBINS WOULD LIKE SOMEONE TO CALL HER REGARDING PT    [phone]

## (undated) DEVICE — AIRWY 90MM NO9

## (undated) DEVICE — ANTIBACTERIAL UNDYED BRAIDED (POLYGLACTIN 910), SYNTHETIC ABSORBABLE SUTURE: Brand: COATED VICRYL

## (undated) DEVICE — DECANT BG O JET

## (undated) DEVICE — AVANTI + 4F STD W/GW: Brand: AVANTI

## (undated) DEVICE — STERILE LATEX POWDER FREE SURGICAL GLOVES WITH HYDROGEL COATING: Brand: PROTEXIS

## (undated) DEVICE — NDL HYPO SFTY PROEDGE 27G 1 1/4IN GRY

## (undated) DEVICE — SUCTION CANISTER, 2500CC, RIGID: Brand: DEROYAL

## (undated) DEVICE — 3M™ STERI-DRAPE™ INSTRUMENT POUCH 1018: Brand: STERI-DRAPE™

## (undated) DEVICE — SI AVANTI+ 6F STD W/GW  NO OBT: Brand: AVANTI

## (undated) DEVICE — CVR HNDL LT SURG ACCSSRY BLU STRL

## (undated) DEVICE — GW THRUWAY .018 190CM

## (undated) DEVICE — CVR HNDL LIGHT RIGID

## (undated) DEVICE — 1 ML TUBERCULIN SYRINGE REGULAR TIP: Brand: MONOJECT

## (undated) DEVICE — SUT SILK 2/0 KS 30IN 623H

## (undated) DEVICE — PK VASC 10

## (undated) DEVICE — SUT SILK 2/0 SH 30IN K833H

## (undated) DEVICE — GLIDEX™ COATED HYDROPHILIC GUIDEWIRE: Brand: MAGIC TORQUE™

## (undated) DEVICE — 3M™ IOBAN™ 2 ANTIMICROBIAL INCISE DRAPE 6650EZ: Brand: IOBAN™ 2

## (undated) DEVICE — NDL PERC 1PRT THNWALL W/BASEPLT 18G 7CM

## (undated) DEVICE — INFLATION DEVICE: Brand: ENCORE™ 26

## (undated) DEVICE — CANNULA,OXY,ADULT,SUPERSOFT,W/7'TUB,UC: Brand: MEDLINE

## (undated) DEVICE — DRSNG SURG AQUACEL AG 9X10CM

## (undated) DEVICE — SUT SILK 2/0 PS 18IN 1588H

## (undated) DEVICE — SUT SILK 3/0 TIES 18IN A184H

## (undated) DEVICE — SUT SILK 2/0 TIES 18IN A185H

## (undated) DEVICE — CATH GUIDE SOFTVU FLUSH HT PIG .035 4F 65CM

## (undated) DEVICE — KT VLV HEMO MAP ACC PLS LG/BORE MTL/INTRO W/TORQ/DEV

## (undated) DEVICE — 150CC POWER INJ SYR: Brand: DEROYAL

## (undated) DEVICE — TBG INJ CONTRL EXCITE RA 1200PSI 48IN

## (undated) DEVICE — SKIN AFFIX SURG ADHESIVE 72/CS 0.55ML: Brand: MEDLINE

## (undated) DEVICE — NDL HYPO ECLPS SFTY 22G 1 1/2IN

## (undated) DEVICE — SYR CONTRL LUERLOK 10CC

## (undated) DEVICE — SUT PROLN 6/0 C1 D/A 30IN 8706H

## (undated) DEVICE — DRN PENRS 1/4X18IN LTX

## (undated) DEVICE — MEDI-VAC NON-CONDUCTIVE SUCTION TUBING: Brand: CARDINAL HEALTH

## (undated) DEVICE — MEDI-VAC YANKAUER SUCTION HANDLE W/BULBOUS TIP: Brand: CARDINAL HEALTH

## (undated) DEVICE — PK ANGIO OR 10

## (undated) DEVICE — SUT PROLN SURGILENE 5.0 24IN BLU 9702H

## (undated) DEVICE — SUT SILK 4/0 TIES 18IN A183H

## (undated) DEVICE — ADAPT ST INFUS ADMIN SYR 70IN

## (undated) DEVICE — SUT PROLN 7/0 BV1 D/A 24IN 8702H

## (undated) DEVICE — SPNG GZ WOVN 4X4IN 12PLY 10/BX STRL